# Patient Record
Sex: MALE | Race: WHITE | NOT HISPANIC OR LATINO | Employment: STUDENT | URBAN - METROPOLITAN AREA
[De-identification: names, ages, dates, MRNs, and addresses within clinical notes are randomized per-mention and may not be internally consistent; named-entity substitution may affect disease eponyms.]

---

## 2017-03-02 DIAGNOSIS — E25.0 CONGENITAL ADRENAL HYPERPLASIA DUE TO 21-HYDROXYLASE DEFICIENCY (21-OH CAH), SIMPLE VIRILIZING (H): ICD-10-CM

## 2017-03-02 RX ORDER — FLUDROCORTISONE ACETATE 0.1 MG/1
0.1 TABLET ORAL DAILY
Qty: 90 TABLET | Refills: 2 | Status: SHIPPED | OUTPATIENT
Start: 2017-03-02 | End: 2017-11-28

## 2017-03-02 NOTE — TELEPHONE ENCOUNTER
fludrocortisone     Last Written Prescription Date:  12/10/15  Last Fill Quantity: 90,   # refills: 3  Last Office Visit : 7/6/16  Future Office visit:  No future appt    Routing refill request to provider for review/approval because:  Drug not on the [facility], [facility] or [facility] refill protocol or controlled substance

## 2017-07-25 DIAGNOSIS — E27.40 ADRENAL INSUFFICIENCY (H): ICD-10-CM

## 2017-07-25 RX ORDER — HYDROCORTISONE 10 MG/1
TABLET ORAL
Qty: 270 TABLET | Refills: 1 | Status: SHIPPED | OUTPATIENT
Start: 2017-07-25 | End: 2018-01-18

## 2017-11-28 DIAGNOSIS — E25.0 CONGENITAL ADRENAL HYPERPLASIA DUE TO 21-HYDROXYLASE DEFICIENCY (21-OH CAH), SIMPLE VIRILIZING (H): ICD-10-CM

## 2017-11-28 NOTE — TELEPHONE ENCOUNTER
florinef    Last Written Prescription Date:  3/2/17  Last Fill Quantity: 90,   # refills: 2  Last Office Visit : 7/6/16  Future Office visit:  12/13/17    Routing refill request to provider for review/approval because:  Drug not on the [facility], [facility] or [facility] refill protocol or controlled substance

## 2017-11-29 RX ORDER — FLUDROCORTISONE ACETATE 0.1 MG/1
0.1 TABLET ORAL DAILY
Qty: 90 TABLET | Refills: 0 | Status: SHIPPED | OUTPATIENT
Start: 2017-11-29 | End: 2018-03-06

## 2017-12-13 ENCOUNTER — OFFICE VISIT (OUTPATIENT)
Dept: ENDOCRINOLOGY | Facility: CLINIC | Age: 27
End: 2017-12-13
Payer: COMMERCIAL

## 2017-12-13 VITALS
HEART RATE: 81 BPM | WEIGHT: 157.3 LBS | DIASTOLIC BLOOD PRESSURE: 73 MMHG | SYSTOLIC BLOOD PRESSURE: 135 MMHG | HEIGHT: 69 IN | BODY MASS INDEX: 23.3 KG/M2

## 2017-12-13 DIAGNOSIS — E25.0 CONGENITAL ADRENAL HYPERPLASIA DUE TO 21-HYDROXYLASE DEFICIENCY (21-OH CAH), SIMPLE VIRILIZING (H): Primary | ICD-10-CM

## 2017-12-13 DIAGNOSIS — E25.0 CONGENITAL ADRENAL HYPERPLASIA DUE TO 21-HYDROXYLASE DEFICIENCY (21-OH CAH), SIMPLE VIRILIZING (H): ICD-10-CM

## 2017-12-13 LAB
GLUCOSE SERPL-MCNC: 73 MG/DL (ref 70–99)
POTASSIUM SERPL-SCNC: 4 MMOL/L (ref 3.4–5.3)
SODIUM SERPL-SCNC: 137 MMOL/L (ref 133–144)

## 2017-12-13 ASSESSMENT — PAIN SCALES - GENERAL: PAINLEVEL: NO PAIN (0)

## 2017-12-13 NOTE — PATIENT INSTRUCTIONS
Dr. Cordova will follow up with results of lab tests.    We will be in touch regarding what provider you should see in a year.  Dr. Cordova can handle things until then.

## 2017-12-13 NOTE — LETTER
Date:December 19, 2017      Patient was self referred, no letter generated. Do not send.        [facility] Health Information

## 2017-12-13 NOTE — MR AVS SNAPSHOT
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 Cordova will follow up with results of lab tests.    We will be in touch regarding what provider you should see in a year.  Dr. Cordova can handle things until then.          Follow-ups after your visit        Follow-up notes from your care team     Return in about 1 year (around 12/13/2018).      Your next 10 appointments already scheduled     Dec 13, 2017 10:00 AM CST   LAB with [facility] ([facility])    [address] [place] [id number] [phone]           Please do not eat 10-12 hours before your appointment if you are coming in fasting for labs on lipids, cholesterol, or glucose (sugar). This does not apply to pregnant women. Water, hot tea and black coffee (with nothing added) are okay. Do not drink other fluids, diet soda or chew gum.              Future tests that were ordered for you today     Open Future Orders        Priority Expected Expires Ordered    Androstenedione Routine  12/13/2018 12/13/2017    Potassium Routine  12/8/2018 12/13/2017    Sodium Routine  12/13/2018 12/13/2017    Testosterone total Routine  12/13/2018 12/13/2017    Glucose Routine  12/13/2018 12/13/2017    17 OH progesterone Routine  12/13/2018 12/13/2017            Who to contact     Please call your clinic at [phone] to:    Ask questions about your health    Make or cancel appointments    Discuss your medicines    Learn about your test results    Speak to your doctor   If you have compliments or concerns about an experience at  "your clinic, or if you wish to file a complaint, please contact [facility] Patient Relations at [phone] or email us at [email or web address]         Additional Information About Your Visit        C[employer] Information     Prob[employer] is an electronic gateway that provides easy, online access to your medical records. With Prob[employer], you can request a clinic appointment, read your test results, renew a prescription or communicate with your care team.     To sign up for [employer] visit the website at [email or web address]   You will be asked to enter the access code listed below, as well as some personal information. Please follow the directions to create your username and password.     Your access code is: [email or web address]  Expires: 2018  6:30 AM     Your access code will  in 90 days. If you need help or a new code, please contact your [facility] or call [phone] for assistance.        Care EveryWhere ID     This is your Care EveryWhere ID. This could be used by other organizations to access your [place] medical records  [id number]        Your Vitals Were     Pulse Height BMI (Body Mass Index)             81 1.753 m (5' 9\") 23.23 kg/m2          Blood Pressure from Last 3 Encounters:   17 135/73   16 125/82   04/22/15 129/73    Weight from Last 3 Encounters:   17 71.4 kg (157 lb 4.8 oz)   16 64.4 kg (142 lb)   04/22/15 61.9 kg (136 lb 6.4 oz)                 Today's Medication Changes          These changes are accurate as of: 17  9:53 AM.  If you have any questions, ask your nurse or doctor.               Stop taking these medicines if you haven't already. Please contact your care team if you have questions.     FLUoxetine HCl (PMDD) 20 MG Caps   Stopped by:  Facundo Cordova MD                    Primary Care Provider    None Specified       No primary provider on file.        Equal Access to Services  "    CASSIDY LA : Hadii aad joanie kate Johnston, waaxda luqadaha, qaybta kaalmada leatha, ana john jocelynebarrie pantoja jessicagabriel bianchi . So [facility] [phone].    ATENCIÓN: Si habla ridge, tiene a peralta disposición servicios gratuitos de asistencia lingüística. Llame al [phone].    We comply with applicable federal civil rights laws and [place] laws. We do not discriminate on the basis of race, color, national origin, age, disability, sex, sexual orientation, or gender identity.            Thank you!     Thank you for choosing [facility]  for your care. Our goal is always to provide you with excellent care. Hearing back from our patients is one way we can continue to improve our services. Please take a few minutes to complete the written survey that you may receive in the mail after your visit with us. Thank you!             Your Updated Medication List - Protect others around you: Learn how to safely use, store and throw away your medicines at www.disposemymeds.org.          This list is accurate as of: 12/13/17  9:53 AM.  Always use your most recent med list.                   Brand Name Dispense Instructions for use Diagnosis    ADVAIR DISKUS 250-50 MCG/DOSE diskus inhaler   Generic drug:  fluticasone-salmeterol      Inhale 1 puff into the lungs every 12 hours.        ALBUTEROL      as needed.        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

## 2017-12-13 NOTE — PROGRESS NOTES
DIAGNOSIS:  A 27-year-old man with congenital adrenal hyperplasia, on hydrocortisone and Florinef therapy.      HISTORY OF PRESENT ILLNESS:  Mr. Dsouza is here for a followup visit.  We typically see him about every 12 months.  It has been about a year and a half since his last visit with us when we saw him in 07/2016.  At that time, he was doing well.  His androstenedione and testosterone levels were in a good range, and we did not make any changes in his regimen of hydrocortisone 20 mg in the morning and  10 in the evening and Florinef 0.1 mg daily.       He returns now to follow up.  He continues on the same regimen of hydrocortisone and Florinef.  He feels well.  He has had no new medical issues or problems since we last saw him.  He has had no surgeries.      Energy level is good.  He has no orthostatic symptoms.  He does tend to like salt.  Appetite is good.  He reports a good libido.  He has had no bone issues and no fractures since we saw him last.  He has had no problems with fluid retention or lower extremity swelling.      MEDICATIONS:  Fludrocortisone and hydrocortisone as above.  He is on a daily medication for asthma and has an albuterol inhaler to use as needed.  He is on venlafaxine and Wellbutrin for some depression.      REVIEW OF SYSTEMS:  As above.  He has no acute cardiac or respiratory complaints.  He has noted no lumps or pain in the testicles.  He reports good libido.      Of note, since we saw him last he did change his work position and now works for the [employer].       PHYSICAL EXAMINATION:   GENERAL:  A very healthy-appearing young man who appears normally virilized.  He has no cushingoid features.    VITAL SIGNS:  Weight is 157 pounds; he has gained about 15 pounds since 07/2016.  Pulse 81.  Blood pressure 135/73.   EYES:  Extraocular movements are intact.   SKIN:  He has no hyperpigmentation, no abnormal striae or bruising.  He has normal hair distribution in a male  pattern.   CHEST:  Clear.   CARDIAC:  Regular rate and rhythm, normal heart sounds.  He has a midline surgical scar from a cardiac surgery he had when he was younger.   ABDOMEN:  Flat.   EXTREMITIES:  He has no edema.   GENITALIA:  Penis is uncircumcised, normally developed.  Testes are 20 mL bilaterally, normal consistency.  I do not palpate any mass in the testes.      LABORATORY TESTS:  He has no laboratory tests in the interim since we saw him in 07/2016.  At that time, he had a normal sodium of 137.  Potassium was borderline low at 3.3.  Glucose was 97.  Testosterone was 348.  Androstenedione was 1.05.       ASSESSMENT:  The patient is a 27-year-old man with a diagnosis of congenital adrenal hyperplasia.  The patient was diagnosed at age 1 month with symptoms of failure to thrive.  He has been on hormone replacement therapy since that time and has had no evidence of complications.      Interestingly, Laci's mother also has a diagnosis of congenital adrenal hyperplasia which on the surface seems a little unusual since it is a recessive disease.  We have documented elevated    17-hydroxyprogesterone levels in Laci in the past back in 2011, so certainly his laboratory tests would be consistent with the diagnosis.       Clinically he has had no evidence of adrenal insufficiency.  On testicular exam, there has been no evidence of any adrenal rest tumors.       PLAN:   1.  Continue current dose of hydrocortisone and Florinef for now.   2.  We will check routine monitoring laboratory tests including androstenedione and testosterone.  We will follow up with him on the results and make any adjustments in his therapy if necessary.  Otherwise, we continue his current regimen.      As I am retiring from clinical practice, we will plan to have him return in a year to see one of my colleagues.  I can be available to manage things in the interim should the need arise.

## 2017-12-13 NOTE — LETTER
12/13/2017       RE: Laci Dsouza  [address] [facility] [address]     Dear Colleague,    Thank you for referring your patient, Laci Dsouza, to the [facility] at [facility] Please see a copy of my visit note below.    DIAGNOSIS:  A 27-year-old man with congenital adrenal hyperplasia, on hydrocortisone and Florinef therapy.      HISTORY OF PRESENT ILLNESS:  Mr. Dsouza is here for a followup visit.  We typically see him about every 12 months.  It has been about a year and a half since his last visit with us when we saw him in 07/2016.  At that time, he was doing well.  His androstenedione and testosterone levels were in a good range, and we did not make any changes in his regimen of hydrocortisone 20 mg in the morning and  10 in the evening and Florinef 0.1 mg daily.       He returns now to follow up.  He continues on the same regimen of hydrocortisone and Florinef.  He feels well.  He has had no new medical issues or problems since we last saw him.  He has had no surgeries.      Energy level is good.  He has no orthostatic symptoms.  He does tend to like salt.  Appetite is good.  He reports a good libido.  He has had no bone issues and no fractures since we saw him last.  He has had no problems with fluid retention or lower extremity swelling.      MEDICATIONS:  Fludrocortisone and hydrocortisone as above.  He is on a daily medication for asthma and has an albuterol inhaler to use as needed.  He is on venlafaxine and Wellbutrin for some depression.      REVIEW OF SYSTEMS:  As above.  He has no acute cardiac or respiratory complaints.  He has noted no lumps or pain in the testicles.  He reports good libido.      Of note, since we saw him last he did change his work position and now works for the [employer].       PHYSICAL EXAMINATION:   GENERAL:  A very healthy-appearing young man who appears normally virilized.  He has no cushingoid  features.    VITAL SIGNS:  Weight is 157 pounds; he has gained about 15 pounds since 07/2016.  Pulse 81.  Blood pressure 135/73.   EYES:  Extraocular movements are intact.   SKIN:  He has no hyperpigmentation, no abnormal striae or bruising.  He has normal hair distribution in a male pattern.   CHEST:  Clear.   CARDIAC:  Regular rate and rhythm, normal heart sounds.  He has a midline surgical scar from a cardiac surgery he had when he was younger.   ABDOMEN:  Flat.   EXTREMITIES:  He has no edema.   GENITALIA:  Penis is uncircumcised, normally developed.  Testes are 20 mL bilaterally, normal consistency.  I do not palpate any mass in the testes.      LABORATORY TESTS:  He has no laboratory tests in the interim since we saw him in 07/2016.  At that time, he had a normal sodium of 137.  Potassium was borderline low at 3.3.  Glucose was 97.  Testosterone was 348.  Androstenedione was 1.05.       ASSESSMENT:  The patient is a 27-year-old man with a diagnosis of congenital adrenal hyperplasia.  The patient was diagnosed at age 1 month with symptoms of failure to thrive.  He has been on hormone replacement therapy since that time and has had no evidence of complications.      Interestingly, Laci's mother also has a diagnosis of congenital adrenal hyperplasia which on the surface seems a little unusual since it is a recessive disease.  We have documented elevated    17-hydroxyprogesterone levels in Laci in the past back in 2011, so certainly his laboratory tests would be consistent with the diagnosis.       Clinically he has had no evidence of adrenal insufficiency.  On testicular exam, there has been no evidence of any adrenal rest tumors.       PLAN:   1.  Continue current dose of hydrocortisone and Florinef for now.   2.  We will check routine monitoring laboratory tests including androstenedione and testosterone.  We will follow up with him on the results and make any adjustments in his therapy if necessary.  Otherwise,  we continue his current regimen.      As I am retiring from clinical practice, we will plan to have him return in a year to see one of my colleagues.  I can be available to manage things in the interim should the need arise.           Again, thank you for allowing me to participate in the care of your patient.      Sincerely,    Facundo Cordova MD

## 2017-12-13 NOTE — LETTER
12/13/2017      RE: Laci Dsouza  [address] [facility] [address]       DIAGNOSIS:  A 27-year-old man with congenital adrenal hyperplasia, on hydrocortisone and Florinef therapy.      HISTORY OF PRESENT ILLNESS:  Mr. Dsouza is here for a followup visit.  We typically see him about every 12 months.  It has been about a year and a half since his last visit with us when we saw him in 07/2016.  At that time, he was doing well.  His androstenedione and testosterone levels were in a good range, and we did not make any changes in his regimen of hydrocortisone 20 mg in the morning and  10 in the evening and Florinef 0.1 mg daily.       He returns now to follow up.  He continues on the same regimen of hydrocortisone and Florinef.  He feels well.  He has had no new medical issues or problems since we last saw him.  He has had no surgeries.      Energy level is good.  He has no orthostatic symptoms.  He does tend to like salt.  Appetite is good.  He reports a good libido.  He has had no bone issues and no fractures since we saw him last.  He has had no problems with fluid retention or lower extremity swelling.      MEDICATIONS:  Fludrocortisone and hydrocortisone as above.  He is on a daily medication for asthma and has an albuterol inhaler to use as needed.  He is on venlafaxine and Wellbutrin for some depression.      REVIEW OF SYSTEMS:  As above.  He has no acute cardiac or respiratory complaints.  He has noted no lumps or pain in the testicles.  He reports good libido.      Of note, since we saw him last he did change his work position and now works for the [employer].       PHYSICAL EXAMINATION:   GENERAL:  A very healthy-appearing young man who appears normally virilized.  He has no cushingoid features.    VITAL SIGNS:  Weight is 157 pounds; he has gained about 15 pounds since 07/2016.  Pulse 81.  Blood pressure 135/73.   EYES:  Extraocular movements are intact.   SKIN:  He has no  hyperpigmentation, no abnormal striae or bruising.  He has normal hair distribution in a male pattern.   CHEST:  Clear.   CARDIAC:  Regular rate and rhythm, normal heart sounds.  He has a midline surgical scar from a cardiac surgery he had when he was younger.   ABDOMEN:  Flat.   EXTREMITIES:  He has no edema.   GENITALIA:  Penis is uncircumcised, normally developed.  Testes are 20 mL bilaterally, normal consistency.  I do not palpate any mass in the testes.      LABORATORY TESTS:  He has no laboratory tests in the interim since we saw him in 07/2016.  At that time, he had a normal sodium of 137.  Potassium was borderline low at 3.3.  Glucose was 97.  Testosterone was 348.  Androstenedione was 1.05.       ASSESSMENT:  The patient is a 27-year-old man with a diagnosis of congenital adrenal hyperplasia.  The patient was diagnosed at age 1 month with symptoms of failure to thrive.  He has been on hormone replacement therapy since that time and has had no evidence of complications.      Interestingly, Laci's mother also has a diagnosis of congenital adrenal hyperplasia which on the surface seems a little unusual since it is a recessive disease.  We have documented elevated    17-hydroxyprogesterone levels in Laci in the past back in 2011, so certainly his laboratory tests would be consistent with the diagnosis.       Clinically he has had no evidence of adrenal insufficiency.  On testicular exam, there has been no evidence of any adrenal rest tumors.       PLAN:   1.  Continue current dose of hydrocortisone and Florinef for now.   2.  We will check routine monitoring laboratory tests including androstenedione and testosterone.  We will follow up with him on the results and make any adjustments in his therapy if necessary.  Otherwise, we continue his current regimen.      As I am retiring from clinical practice, we will plan to have him return in a year to see one of my colleagues.  I can be available to manage things in  the interim should the need arise.           Facundo Cordova MD

## 2017-12-13 NOTE — NURSING NOTE
Chief Complaint   Patient presents with     RECHECK     FOLLOW UP CONGENITAL ADRENAL      Ping Barnes CMA

## 2017-12-15 LAB — TESTOST SERPL-MCNC: 153 NG/DL (ref 240–950)

## 2017-12-17 LAB — ANDROST SERPL-MCNC: 1.89 NG/ML (ref 0.33–1.34)

## 2017-12-27 LAB — 17OHP SERPL-MCNC: 2800 NG/DL

## 2018-01-03 DIAGNOSIS — E25.0 CONGENITAL ADRENAL HYPERPLASIA DUE TO 21-HYDROXYLASE DEFICIENCY (21-OH CAH), SIMPLE VIRILIZING (H): Primary | ICD-10-CM

## 2018-01-04 ENCOUNTER — TELEPHONE (OUTPATIENT)
Dept: ENDOCRINOLOGY | Facility: CLINIC | Age: 28
End: 2018-01-04

## 2018-01-04 NOTE — TELEPHONE ENCOUNTER
Left msg on vm informing him that Dr Cordova has sent a hard copy of his Letter of 01/03/2018 answering his question.

## 2018-01-04 NOTE — TELEPHONE ENCOUNTER
----- Message from Facundo Cordova MD sent at 1/4/2018  9:08 AM CST -----  Sent him letter explaining labs and why wanted to repeat (he is not on My Chart), but he prob has not gotten letter yet.  Can you make sure letter has gone out.  Can tell him to call me after gets letter if still has questions.  Thanks BR        ----- Message -----     From: Velia Johnson RN     Sent: 1/4/2018   7:26 AM       To: Facundo Cordova MD    Pt confirms he understands your recommendation but is also requesting why you are requesting more labs at this time.   ----- Message -----     From: Facundo Cordova MD     Sent: 1/3/2018   4:15 PM       To: [facility]      Would like pt to come to lab to recheck some lab tests.  Orders put into epic.  We are checking testosterone level so he should have the lab done in the morning by 10 am or earlier.  Tell him to make sure he takes his hydrocortisone the day before and the morning he gets the labs done. Thanks BR

## 2018-01-09 DIAGNOSIS — E25.0 CONGENITAL ADRENAL HYPERPLASIA DUE TO 21-HYDROXYLASE DEFICIENCY (21-OH CAH), SIMPLE VIRILIZING (H): ICD-10-CM

## 2018-01-09 LAB — LH SERPL-ACNC: 1.8 IU/L (ref 1.5–9.3)

## 2018-01-10 LAB — TESTOST SERPL-MCNC: 383 NG/DL (ref 240–950)

## 2018-01-12 LAB — ANDROST SERPL-MCNC: 2.32 NG/ML (ref 0.33–1.34)

## 2018-01-18 DIAGNOSIS — E27.40 ADRENAL INSUFFICIENCY (H): ICD-10-CM

## 2018-01-18 RX ORDER — HYDROCORTISONE 10 MG/1
TABLET ORAL
Qty: 270 TABLET | Refills: 1 | Status: SHIPPED | OUTPATIENT
Start: 2018-01-18 | End: 2018-07-09

## 2018-01-18 RX ORDER — HYDROCORTISONE 10 MG/1
TABLET ORAL
Qty: 270 TABLET | Refills: 1 | Status: CANCELLED | OUTPATIENT
Start: 2018-01-18

## 2018-01-18 NOTE — TELEPHONE ENCOUNTER
cortef  Last Written Prescription Date:  7/25/17  Last Fill Quantity: 270,   # refills: 1  Last Office Visit :12/13/17  Future Office visit:  no    Routing refill request to provider for review/approval because:  Former pt of Dr. Cordova

## 2018-02-12 ENCOUNTER — TELEPHONE (OUTPATIENT)
Dept: ENDOCRINOLOGY | Facility: CLINIC | Age: 28
End: 2018-02-12

## 2018-02-12 NOTE — TELEPHONE ENCOUNTER
Pt is former Dr. Cordova pt, having first dental check up has had in years. D/t previous surgery he had, must take prophylactic antibiotics. Dr. Cordova was closest he had to PCP. Requesting be Rx'd. Please advise at [phone]

## 2018-02-14 NOTE — TELEPHONE ENCOUNTER
Dr Cordova is now retired from clinic . Endocrine is not PC care . Laci needs to  establish care with a PCP.

## 2018-03-06 DIAGNOSIS — E25.0 CONGENITAL ADRENAL HYPERPLASIA DUE TO 21-HYDROXYLASE DEFICIENCY (21-OH CAH), SIMPLE VIRILIZING (H): ICD-10-CM

## 2018-03-06 RX ORDER — FLUDROCORTISONE ACETATE 0.1 MG/1
0.1 TABLET ORAL DAILY
Qty: 90 TABLET | Refills: 3 | Status: SHIPPED | OUTPATIENT
Start: 2018-03-06 | End: 2019-03-21

## 2018-07-08 DIAGNOSIS — E27.40 ADRENAL INSUFFICIENCY (H): ICD-10-CM

## 2018-07-08 RX ORDER — HYDROCORTISONE 10 MG/1
TABLET ORAL
Qty: 270 TABLET | Refills: 1 | Status: CANCELLED | OUTPATIENT
Start: 2018-07-08

## 2018-07-09 DIAGNOSIS — E27.40 ADRENAL INSUFFICIENCY (H): ICD-10-CM

## 2018-07-09 RX ORDER — HYDROCORTISONE 10 MG/1
TABLET ORAL
Qty: 270 TABLET | Refills: 1 | Status: SHIPPED | OUTPATIENT
Start: 2018-07-09 | End: 2019-01-10

## 2018-07-09 NOTE — TELEPHONE ENCOUNTER
cortef  Last Written Prescription Date:  1/18/18  Last Fill Quantity: 270,   # refills: 1  Last Office Visit : 12/13/17  Future Office visit:  12/3/18    Routing refill request to provider for review/approval because:  Former pt of Dr. Cordova

## 2018-12-03 ENCOUNTER — OFFICE VISIT (OUTPATIENT)
Dept: ENDOCRINOLOGY | Facility: CLINIC | Age: 28
End: 2018-12-03
Payer: COMMERCIAL

## 2018-12-03 VITALS
DIASTOLIC BLOOD PRESSURE: 79 MMHG | WEIGHT: 152 LBS | HEART RATE: 65 BPM | HEIGHT: 69 IN | BODY MASS INDEX: 22.51 KG/M2 | SYSTOLIC BLOOD PRESSURE: 131 MMHG

## 2018-12-03 DIAGNOSIS — E25.0 CONGENITAL ADRENAL HYPERPLASIA (H): Primary | ICD-10-CM

## 2018-12-03 DIAGNOSIS — E25.0 CONGENITAL ADRENAL HYPERPLASIA (H): ICD-10-CM

## 2018-12-03 DIAGNOSIS — Z00.00 HEALTH CARE MAINTENANCE: ICD-10-CM

## 2018-12-03 LAB
ALBUMIN SERPL-MCNC: 4.2 G/DL (ref 3.4–5)
ANION GAP SERPL CALCULATED.3IONS-SCNC: 8 MMOL/L (ref 3–14)
BUN SERPL-MCNC: 11 MG/DL (ref 7–30)
CALCIUM SERPL-MCNC: 9 MG/DL (ref 8.5–10.1)
CHLORIDE SERPL-SCNC: 104 MMOL/L (ref 94–109)
CO2 SERPL-SCNC: 26 MMOL/L (ref 20–32)
CREAT SERPL-MCNC: 0.62 MG/DL (ref 0.66–1.25)
DHEA-S SERPL-MCNC: 89 UG/DL (ref 80–560)
GFR SERPL CREATININE-BSD FRML MDRD: >90 ML/MIN/1.7M2
GLUCOSE SERPL-MCNC: 60 MG/DL (ref 70–99)
LH SERPL-ACNC: 2.4 IU/L (ref 1.5–9.3)
POTASSIUM SERPL-SCNC: 3.6 MMOL/L (ref 3.4–5.3)
SODIUM SERPL-SCNC: 139 MMOL/L (ref 133–144)

## 2018-12-03 RX ORDER — BUDESONIDE AND FORMOTEROL FUMARATE DIHYDRATE 160; 4.5 UG/1; UG/1
2 AEROSOL RESPIRATORY (INHALATION) 2 TIMES DAILY
Refills: 11 | COMMUNITY
Start: 2018-10-27 | End: 2019-02-28

## 2018-12-03 RX ORDER — BUPROPION HYDROCHLORIDE 100 MG/1
TABLET, EXTENDED RELEASE ORAL
COMMUNITY
Start: 2017-05-08 | End: 2019-03-04

## 2018-12-03 NOTE — PROGRESS NOTES
Laci Dsouza is a 28 year old male with congenital adrenal hyperplasia, on hydrocortisone and Florinef therapy.  He was previously seen by Dr. Cordova.   The patient was diagnosed at 1 month of age and has been on lifelong hormone replacement.  Current dose of hydrocortisone is 20 mg in the morning, around 6 AM, when he wakes up, and 10 mg around 4-5 PM, when he comes back from work.  With the exception of some fatigue noticed when he comes back from work, he denies any other signs or symptoms.  He takes the Florinef dose in the morning, 0.1 mg daily.     Past Medical History   Past Medical History:   Diagnosis Date     Anxiety      Aortic atresia or stenosis, congenital 12/2001    Repair 2001 after had MI;     Congenital adrenal hyperplasia (H) 1989    Dx at 1 mo of age; mother with CAH also (?)   Depression   No fractures  Asthma    Past Surgical History   No past surgical history on file.    Current Medications  Prescription Medications as of 12/3/2018             albuterol (PROAIR HFA, PROVENTIL HFA, VENTOLIN HFA) 108 (90 BASE) MCG/ACT inhaler Inhale 2 puffs into the lungs every 6 hours as needed for shortness of breath / dyspnea or wheezing    buPROPion (WELLBUTRIN SR) 100 MG 12 hr tablet TAKE 1 TABLET BY MOUTH ONCE A DAY    fludrocortisone (FLORINEF) 0.1 MG tablet Take 1 tablet (0.1 mg) by mouth daily    hydrocortisone (CORTEF) 10 MG tablet 2 tabs in am and 1 tab in afternoon/evening    SYMBICORT 160-4.5 MCG/ACT Inhaler Inhale 2 puffs into the lungs 2 times daily    Venlafaxine HCl (EFFEXOR PO) Take by mouth 3 times daily      Takes a MVI for men     Family History   Mother - congenital adrenal hyperplasia. Father - CVA in his 60s.     Social History  Single. No children. He might be interested in adopting, in the future. He recently quit smoking. Smoker, 1/2 PPD for 10 years. Drinks 1-4 alcoholic drinks a week.  Denies using illicit drugs. 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  "    Review of Systems   Systemic:               Eye:                      No eye symptoms   Lizeth-Laryngeal:     No lizeht-laryngeal symptoms, no dysphagia, no hoarseness, no cough     Breast:                  No breast symptoms  Cardiovascular:    No cardiovascular symptoms, no CP or palpitations   Pulmonary:           No pulmonary symptoms, no SOB or cough    Gastrointestinal:   No gastrointestinal symptoms, no diarrhea or constipation   Genitourinary:       increased urination - for many years; urinates ~1-2 times a night; drinks 8 glasses of 8 oz water daily, by habit; some weak stream    Endocrine:            No endocrine symptoms, no cold or heat intolerance   Neurological:        No neurological symptoms, no headaches, no tremor, no numbness or tingling sensation, no dizziness   Musculoskeletal:  Lower back joint pain   Skin:                     No skin symptoms, no dry skin, no hair falling out; no changes of the facial hair   Psychological:      Depression - longstanding and controlled                 Vital Signs     Previous Weights:    Wt Readings from Last 10 Encounters:   12/03/18 68.9 kg (152 lb)   12/13/17 71.4 kg (157 lb 4.8 oz)   07/06/16 64.4 kg (142 lb)   04/22/15 61.9 kg (136 lb 6.4 oz)   11/08/13 61 kg (134 lb 8 oz)   12/12/12 60.8 kg (134 lb)        /79  Pulse 65  Ht 1.753 m (5' 9\")  Wt 68.9 kg (152 lb)  BMI 22.45 kg/m2    Physical Exam  General Appearance: he is well developed, well nourished and in no distress     Eyes:  conjutivae and extra-ocular motions are normal.                                    pupils round and reactive to light, no lid lag, no stare    HEENT:   oropharynx clear and moist, no JVD, no bruits      no thyromegaly, no palpable nodules   Cardiovascular:  regular rhythm, no murmurs, distal pulse palpable, no edema  Respiratory:        chest clear, no rales, no rhonchi   Gastrointestinal:  abdomen soft, non-tender, non-distended, normal bowel sounds,    no " organomegaly  Musculoskeletal:  normal tone and strength  Psychological:          affect and judgment normal  Skin:  normal male hair pattern; no gynecomastia; ? Extra nipple R and and lower chest   Neurological:  reflexes normal and symmetric, no resting tremor   Genital exam:            Normal testicular size, no palpable masses     Lab Results  I reviewed prior lab results documented in Epic.  TSH   Date Value Ref Range Status   08/07/2008 0.34 mcU/mL Final     Assessment     1. Congenital adrenal hyperplasia, diagnosed shortly after birth.  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  It is interesting that the patient has a family history of congenital adrenal hyperplasia in his mother.  At this point in time, he is not interested in conceiving.  Clinically, he is a symptomatic with the exception of mild fatigue, noticed when he comes back from work, in the afternoon.    Recommendations:  Administer the first dose of hydrocortisone and the Florinef right when he wakes up  Try to administer the second dose of 10 mg hydrocortisone around 2 PM   Schedule an ultrasound of the testes, to evaluate for adrenal rest tumors  Check lab work: Free and total testosterone, LH, DHEAS, androstenedione, 17 hydroxyprogesterone    2.  Health maintenance  The patient reports taking a daily multivitamin but he is not sure of the vitamin D dose it contains.  In view of the long-term treatment with hydrocortisone, and the high risk of osteoporosis, I recommended to have a vitamin D level checked today.  We are going to decide on the vitamin D dose depending on lab results.     Orders Placed This Encounter   Procedures     US Testicular and Scrotum     Testosterone Free and Total     Lutropin     DHEA sulfate     Androstenedione     Basic metabolic panel     25 Hydroxyvitamin D2 and D3     Albumin level     17 OH progesterone

## 2018-12-03 NOTE — NURSING NOTE
Chief Complaint   Patient presents with     RECHECK     Congenital Adrenal Hyperplasia     Yumiko White, CMA     Wife

## 2018-12-03 NOTE — MR AVS SNAPSHOT
After Visit Summary   12/3/2018    Laci Dsouza    MRN: [id number]           Patient Information     Date Of Birth          1990        Visit Information        Provider Department      12/3/2018 7:30 AM Deja Warner MD [facility]        Today's Diagnoses     Congenital adrenal hyperplasia (H)    -  1      Care Instructions    To schedule with our Imaging Department, please call: [phone]            Follow-ups after your visit        Follow-up notes from your care team     Return in about 1 year (around 12/3/2019) for labs today, ultrasound to be scheduled.      Your next 10 appointments already scheduled     Dec 03, 2018  8:15 AM CST   LAB with [facility] ([facility])    [address] [place] [id number] [phone]           Please do not eat 10-12 hours before your appointment if you are coming in fasting for labs on lipids, cholesterol, or glucose (sugar). This does not apply to pregnant women. Water, hot tea and black coffee (with nothing added) are okay. Do not drink other fluids, diet soda or chew gum.              Future tests that were ordered for you today     Open Future Orders        Priority Expected Expires Ordered    17 OH progesterone Routine 12/3/2018 12/3/2019 12/3/2018    US Testicular and Scrotum Routine  12/3/2019 12/3/2018    Testosterone Free and Total Routine 12/3/2018 12/3/2019 12/3/2018    Lutropin Routine 12/3/2018 12/3/2019 12/3/2018    DHEA sulfate Routine  12/3/2019 12/3/2018    Androstenedione Routine  12/3/2019 12/3/2018    Basic metabolic panel Routine 12/3/2018 12/3/2019 12/3/2018    25 Hydroxyvitamin D2 and D3 Routine 12/3/2018 12/3/2019 12/3/2018    Albumin level Routine 12/3/2018 12/3/2019 12/3/2018            Who to contact     Please call your clinic at [phone] to:    Ask questions about your health    Make or cancel appointments    Discuss your  "medicines    Learn about your test results    Speak to your doctor            Additional Information About Your Visit        MyChart Information     [employer] is an electronic gateway that provides easy, online access to your medical records. With [employer], you can request a clinic appointment, read your test results, renew a prescription or communicate with your care team.     To sign up for [employer] visit the website at [email or web address]   You will be asked to enter the access code listed below, as well as some personal information. Please follow the directions to create your username and password.     Your access code is: [email or web address]  Expires: 2019  6:30 AM     Your access code will  in 90 days. If you need help or a new code, please contact your [facility] or call [phone] for assistance.        Care EveryWhere ID     This is your Care EveryWhere ID. This could be used by other organizations to access your [facility] medical records  [id number]        Your Vitals Were     Pulse Height BMI (Body Mass Index)             65 1.753 m (5' 9\") 22.45 kg/m2          Blood Pressure from Last 3 Encounters:   18 131/79   17 135/73   16 125/82    Weight from Last 3 Encounters:   18 68.9 kg (152 lb)   17 71.4 kg (157 lb 4.8 oz)   16 64.4 kg (142 lb)               Primary Care Provider    None Specified       No primary provider on file.        Equal Access to Services     CASSIDY LA : Hadii mendez matao Sojavier, waaxda luqadaha, qaybta kaalmada ana zhang . So [facility] [phone].    ATENCIÓN: Si habla español, tiene a peralta disposición servicios gratuitos de asistencia lingüística. Llame al [phone].    We comply with applicable federal civil rights laws and [place] laws. We do not discriminate on the basis of race, color, national origin, age, disability, sex, sexual orientation, or gender " identity.            Thank you!     Thank you for choosing [facility]  for your care. Our goal is always to provide you with excellent care. Hearing back from our patients is one way we can continue to improve our services. Please take a few minutes to complete the written survey that you may receive in the mail after your visit with us. Thank you!             Your Updated Medication List - Protect others around you: Learn how to safely use, store and throw away your medicines at www.disposemymeds.org.          This list is accurate as of 12/3/18  8:04 AM.  Always use your most recent med list.                   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

## 2018-12-03 NOTE — LETTER
12/3/2018       RE: Laci Dsouza  [address] [facility] [address]     Dear Colleague,    Thank you for referring your patient, Laci Dsouza, to the [facility] ENDOCR[facility] at [facility]. Please see a copy of my visit note below.      Laci Dsouza is a 28 year old male with congenital adrenal hyperplasia, on hydrocortisone and Florinef therapy.  He was previously seen by Dr. Cordova.   The patient was diagnosed at 1 month of age and has been on lifelong hormone replacement.  Current dose of hydrocortisone is 20 mg in the morning, around 6 AM, when he wakes up, and 10 mg around 4-5 PM, when he comes back from work.  With the exception of some fatigue noticed when he comes back from work, he denies any other signs or symptoms.  He takes the Florinef dose in the morning, 0.1 mg daily.     Past Medical History   Past Medical History:   Diagnosis Date     Anxiety      Aortic atresia or stenosis, congenital 12/2001    Repair 2001 after had MI;     Congenital adrenal hyperplasia (H) 1989    Dx at 1 mo of age; mother with CAH also (?)   Depression   No fractures  Asthma    Past Surgical History   No past surgical history on file.    Current Medications  Prescription Medications as of 12/3/2018             albuterol (PROAIR HFA, PROVENTIL HFA, VENTOLIN HFA) 108 (90 BASE) MCG/ACT inhaler Inhale 2 puffs into the lungs every 6 hours as needed for shortness of breath / dyspnea or wheezing    buPROPion (WELLBUTRIN SR) 100 MG 12 hr tablet TAKE 1 TABLET BY MOUTH ONCE A DAY    fludrocortisone (FLORINEF) 0.1 MG tablet Take 1 tablet (0.1 mg) by mouth daily    hydrocortisone (CORTEF) 10 MG tablet 2 tabs in am and 1 tab in afternoon/evening    SYMBICORT 160-4.5 MCG/ACT Inhaler Inhale 2 puffs into the lungs 2 times daily    Venlafaxine HCl (EFFEXOR PO) Take by mouth 3 times daily      Takes a MVI for men     Family History   Mother - congenital adrenal hyperplasia. Father - CVA in his  "60s.     Social History  Single. No children. He might be interested in adopting, in the future. He recently quit smoking. Smoker, 1/2 PPD for 10 years. Drinks 1-4 alcoholic drinks a week.  Denies using illicit drugs. 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     Review of Systems   Systemic:               Eye:                      No eye symptoms   Lizeth-Laryngeal:     No lizeth-laryngeal symptoms, no dysphagia, no hoarseness, no cough     Breast:                  No breast symptoms  Cardiovascular:    No cardiovascular symptoms, no CP or palpitations   Pulmonary:           No pulmonary symptoms, no SOB or cough    Gastrointestinal:   No gastrointestinal symptoms, no diarrhea or constipation   Genitourinary:       increased urination - for many years; urinates ~1-2 times a night; drinks 8 glasses of 8 oz water daily, by habit; some weak stream    Endocrine:            No endocrine symptoms, no cold or heat intolerance   Neurological:        No neurological symptoms, no headaches, no tremor, no numbness or tingling sensation, no dizziness   Musculoskeletal:  Lower back joint pain   Skin:                     No skin symptoms, no dry skin, no hair falling out; no changes of the facial hair   Psychological:      Depression - longstanding and controlled                 Vital Signs     Previous Weights:    Wt Readings from Last 10 Encounters:   12/03/18 68.9 kg (152 lb)   12/13/17 71.4 kg (157 lb 4.8 oz)   07/06/16 64.4 kg (142 lb)   04/22/15 61.9 kg (136 lb 6.4 oz)   11/08/13 61 kg (134 lb 8 oz)   12/12/12 60.8 kg (134 lb)        /79  Pulse 65  Ht 1.753 m (5' 9\")  Wt 68.9 kg (152 lb)  BMI 22.45 kg/m2    Physical Exam  General Appearance: he is well developed, well nourished and in no distress     Eyes:  conjutivae and extra-ocular motions are normal.                                    pupils round and reactive to light, no lid lag, no stare    HEENT:   oropharynx clear and moist, no JVD, no bruits "      no thyromegaly, no palpable nodules   Cardiovascular:  regular rhythm, no murmurs, distal pulse palpable, no edema  Respiratory:        chest clear, no rales, no rhonchi   Gastrointestinal:  abdomen soft, non-tender, non-distended, normal bowel sounds,    no organomegaly  Musculoskeletal:  normal tone and strength  Psychological:          affect and judgment normal  Skin:  normal male hair pattern; no gynecomastia; ? Extra nipple R and and lower chest   Neurological:  reflexes normal and symmetric, no resting tremor   Genital exam:            Normal testicular size, no palpable masses     Lab Results  I reviewed prior lab results documented in Epic.  TSH   Date Value Ref Range Status   08/07/2008 0.34 mcU/mL Final     Assessment     1. Congenital adrenal hyperplasia, diagnosed shortly after birth.  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  It is interesting that the patient has a family history of congenital adrenal hyperplasia in his mother.  At this point in time, he is not interested in conceiving.  Clinically, he is a symptomatic with the exception of mild fatigue, noticed when he comes back from work, in the afternoon.    Recommendations:  Administer the first dose of hydrocortisone and the Florinef right when he wakes up  Try to administer the second dose of 10 mg hydrocortisone around 2 PM   Schedule an ultrasound of the testes, to evaluate for adrenal rest tumors  Check lab work: Free and total testosterone, LH, DHEAS, androstenedione, 17 hydroxyprogesterone    2.  Health maintenance  The patient reports taking a daily multivitamin but he is not sure of the vitamin D dose it contains.  In view of the long-term treatment with hydrocortisone, and the high risk of osteoporosis, I recommended to have a vitamin D level checked today.  We are going to decide on the vitamin D dose depending on lab results.     Orders Placed This Encounter   Procedures     US Testicular and Scrotum      Testosterone Free and Total     Lutropin     DHEA sulfate     Androstenedione     Basic metabolic panel     25 Hydroxyvitamin D2 and D3     Albumin level     17 OH progesterone           Deja Warner MD

## 2018-12-04 LAB
SHBG SERPL-SCNC: 42 NMOL/L (ref 11–80)
TESTOST FREE SERPL-MCNC: 3.67 NG/DL (ref 4.7–24.4)
TESTOST SERPL-MCNC: 223 NG/DL (ref 240–950)

## 2018-12-07 LAB
ANDROST SERPL-MCNC: 2.35 NG/ML (ref 0.33–1.34)
DEPRECATED CALCIDIOL+CALCIFEROL SERPL-MC: <54 UG/L (ref 20–75)
VITAMIN D2 SERPL-MCNC: <5 UG/L
VITAMIN D3 SERPL-MCNC: 49 UG/L

## 2018-12-11 LAB — 17OHP SERPL-MCNC: 2366 NG/DL

## 2018-12-17 ENCOUNTER — ANCILLARY PROCEDURE (OUTPATIENT)
Dept: ULTRASOUND IMAGING | Facility: CLINIC | Age: 28
End: 2018-12-17
Attending: INTERNAL MEDICINE
Payer: COMMERCIAL

## 2018-12-17 DIAGNOSIS — E25.0 CONGENITAL ADRENAL HYPERPLASIA (H): ICD-10-CM

## 2018-12-20 ENCOUNTER — OFFICE VISIT (OUTPATIENT)
Dept: FAMILY MEDICINE | Facility: CLINIC | Age: 28
End: 2018-12-20
Payer: COMMERCIAL

## 2018-12-20 VITALS
DIASTOLIC BLOOD PRESSURE: 74 MMHG | SYSTOLIC BLOOD PRESSURE: 136 MMHG | OXYGEN SATURATION: 98 % | BODY MASS INDEX: 22.86 KG/M2 | HEART RATE: 90 BPM | WEIGHT: 154.8 LBS

## 2018-12-20 DIAGNOSIS — F41.1 GAD (GENERALIZED ANXIETY DISORDER): ICD-10-CM

## 2018-12-20 DIAGNOSIS — J45.40 ASTHMA, MODERATE PERSISTENT, WELL-CONTROLLED: ICD-10-CM

## 2018-12-20 DIAGNOSIS — Q25.29 AORTIC ATRESIA OR STENOSIS, CONGENITAL: Primary | ICD-10-CM

## 2018-12-20 DIAGNOSIS — Q25.1 AORTIC ATRESIA OR STENOSIS, CONGENITAL: Primary | ICD-10-CM

## 2018-12-20 SDOH — HEALTH STABILITY: MENTAL HEALTH: HOW OFTEN DO YOU HAVE A DRINK CONTAINING ALCOHOL?: 2-4 TIMES A MONTH

## 2018-12-20 SDOH — HEALTH STABILITY: MENTAL HEALTH: HOW MANY STANDARD DRINKS CONTAINING ALCOHOL DO YOU HAVE ON A TYPICAL DAY?: 3 OR 4

## 2018-12-20 ASSESSMENT — ANXIETY QUESTIONNAIRES
3. WORRYING TOO MUCH ABOUT DIFFERENT THINGS: SEVERAL DAYS
5. BEING SO RESTLESS THAT IT IS HARD TO SIT STILL: SEVERAL DAYS
7. FEELING AFRAID AS IF SOMETHING AWFUL MIGHT HAPPEN: SEVERAL DAYS
2. NOT BEING ABLE TO STOP OR CONTROL WORRYING: SEVERAL DAYS
IF YOU CHECKED OFF ANY PROBLEMS ON THIS QUESTIONNAIRE, HOW DIFFICULT HAVE THESE PROBLEMS MADE IT FOR YOU TO DO YOUR WORK, TAKE CARE OF THINGS AT HOME, OR GET ALONG WITH OTHER PEOPLE: SOMEWHAT DIFFICULT
1. FEELING NERVOUS, ANXIOUS, OR ON EDGE: SEVERAL DAYS
6. BECOMING EASILY ANNOYED OR IRRITABLE: SEVERAL DAYS
GAD7 TOTAL SCORE: 7

## 2018-12-20 ASSESSMENT — PATIENT HEALTH QUESTIONNAIRE - PHQ9
5. POOR APPETITE OR OVEREATING: SEVERAL DAYS
SUM OF ALL RESPONSES TO PHQ QUESTIONS 1-9: 5

## 2018-12-20 NOTE — PATIENT INSTRUCTIONS

## 2018-12-20 NOTE — PROGRESS NOTES
SUBJECTIVE:   Laci Dsouza is a 28 year old male who presents to clinic today for the following health issues:    New Patient/Transfer of Care    Patient with history of asthma, anxiety, congential adrenal hyperplasia and aortic stenosis.  He is followed by cardiology and endocrinology and psychiatrist.  He was seen seeing a pediatrician and needs to establish a primary care provider.      Asthma Follow-Up    Was ACT completed today?    Yes    ACT Total Scores 12/20/2018   ACT TOTAL SCORE (Goal Greater than or Equal to 20) 25       Recent asthma triggers that patient is dealing with: None  - He states his asthma is doing well.  He uses albuterol before exercise only.  Has not had any recent asthma exacerbations. He takes Symbicort 160/4.5 2 puffs BID - rinses mouth out afterwards.   No current asthma symptoms.     Seen by endocrinology 12/3/2018 - is on hydrocortisone and florinef.  Diagnosed with congenital adrenal hyperplasia at birth. To follow-up in one year.    Seen by cardiology 7/25/2018 -  In the [facility].  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  To follow-up in one year.  EKG at that time.   Declines any concerns at this time.       Problem list and histories reviewed & adjusted, as indicated.  Additional history: as documented    Surgical history -     supravalvar aortic stenosis   s/p surgical repair including cor reconstruction - as a child - 2001.     Sexually active with male partner - long-term - no concerns with STDs today.   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     Anxiety - depression - well-controlled on effexor and wellbutrin - does not need refills today.  Declines side effects.  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), simple virilizing (H)     Congenital adrenal hyperplasia (H)     Anxiety     Aortic atresia or stenosis, congenital     Asthma, moderate persistent, well-controlled     Past Surgical History:   Procedure Laterality Date     aortic stenosis s/p surgical repair including cor reconstruction 2001         Social History     Tobacco Use     Smoking status: Former Smoker     Smokeless tobacco: Never Used   Substance Use Topics     Alcohol use: Yes     Frequency: 2-4 times a month     Drinks per session: 3 or 4     Family History   Adopted: Yes   Problem Relation Age of Onset     Bipolar Disorder Father          Current Outpatient Medications   Medication Sig Dispense Refill     albuterol (PROAIR HFA, PROVENTIL HFA, VENTOLIN HFA) 108 (90 BASE) MCG/ACT inhaler Inhale 2 puffs into the lungs every 6 hours as needed for shortness of breath / dyspnea or wheezing 1 Inhaler 1     buPROPion (WELLBUTRIN SR) 100 MG 12 hr tablet TAKE 1 TABLET BY MOUTH ONCE A DAY       fludrocortisone (FLORINEF) 0.1 MG tablet Take 1 tablet (0.1 mg) by mouth daily 90 tablet 3     hydrocortisone (CORTEF) 10 MG tablet 2 tabs in am and 1 tab in afternoon/evening 270 tablet 1     SYMBICORT 160-4.5 MCG/ACT Inhaler Inhale 2 puffs into the lungs 2 times daily  11     venlafaxine (EFFEXOR XR) 150 MG 24 hr capsule Take 1 capsule (150 mg) by mouth daily 30 capsule 0     venlafaxine (EFFEXOR) 75 MG tablet Take 1 tablet (75 mg) by mouth daily 30 tablet      Allergies   Allergen Reactions     Ceclor [Cefaclor Monohydrate]      Unknown       Sulfa Drugs      Unknown       Recent Labs   Lab Test 12/03/18  0824 12/13/17  1009  11/08/13  1140  03/23/11  0945   A1C  --   --   --   --   --  4.8   CR 0.62*  --   --  0.69   < >  --    GFRESTIMATED >90  --   --  >90   < >  --    GFRESTBLACK >90  --   --  >90   < >  --    POTASSIUM 3.6 4.0   < > 3.6   < > 4.1    < > =  values in this interval not displayed.      BP Readings from Last 3 Encounters:   12/20/18 136/74   12/03/18 131/79   12/13/17 135/73    Wt Readings from Last 3 Encounters:   12/20/18 70.2 kg (154 lb 12.8 oz)   12/03/18 68.9 kg (152 lb)   12/13/17 71.4 kg (157 lb 4.8 oz)                  Labs reviewed in EPIC    Reviewed and updated as needed this visit by clinical staff  Tobacco  Allergies  Meds  Fam Hx  Soc Hx      Reviewed and updated as needed this visit by Provider  Tobacco  Fam Hx  Soc Hx        ROS:  CONSTITUTIONAL: NEGATIVE for fever, chills, change in weight  ENT/MOUTH: NEGATIVE for ear, mouth and throat problems  RESP: NEGATIVE for significant cough or SOB  CV: NEGATIVE for chest pain, palpitations or peripheral edema POSITIVE for aortic stenosis - followed by cardiology  ENDO: positive for congenital adrenal hypoplasia - followed by endocrinology - see HPI.   PSYCHIATRIC: NEGATIVE for changes in mood or affect    OBJECTIVE:     /74   Pulse 90   Wt 70.2 kg (154 lb 12.8 oz)   SpO2 98%   BMI 22.86 kg/m    Body mass index is 22.86 kg/m .  GENERAL: healthy, alert and no distress  HENT: ear canals and TM's normal, nose and mouth without ulcers or lesions  RESP: lungs clear to auscultation - no rales, rhonchi or wheezes  CV: regular rate and rhythm, normal S1 S2, no S3 or S4, no murmur, click or rub, no peripheral edema and peripheral pulses strong  PSYCH: mentation appears normal, affect normal/bright  LYMPH: no cervical, supraclavicular, axillary, or inguinal adenopathy    Diagnostic Test Results:  none     ASSESSMENT/PLAN:   Laci was seen today for establish care and recheck medication.    Diagnoses and all orders for this visit:    Aortic atresia or stenosis, congenital  -     CARDIOLOGY EVAL ADULT REFERRAL    KENNY (generalized anxiety disorder)    Asthma, moderate persistent, well-controlled        See Patient Instructions  No refills needed today.  Will call when needs refills.  Need referral  for cardiology for the next time he sees them in July 2019.   Patient with no questions or concerns today. Follow-up in 6 months, sooner if needed.   Patient verbalized understanding & agreed with plan of care.    Bettie Gonsalez DNP, APRN CNP  [facility]

## 2018-12-20 NOTE — NURSING NOTE
Chief Complaint   Patient presents with     Establish Care     Pt is here to [facility]     Recheck Medication     Pt is here to get prescription for astham medication.      Ping Villar CMA  2:11 PM 12/20/2018

## 2018-12-21 RX ORDER — VENLAFAXINE HYDROCHLORIDE 150 MG/1
150 CAPSULE, EXTENDED RELEASE ORAL DAILY
Qty: 30 CAPSULE | Refills: 0 | COMMUNITY
Start: 2018-12-21 | End: 2019-03-29

## 2018-12-21 RX ORDER — VENLAFAXINE 75 MG/1
75 TABLET ORAL DAILY
Qty: 30 TABLET | COMMUNITY
Start: 2018-12-21 | End: 2019-03-29

## 2018-12-21 SDOH — HEALTH STABILITY: PHYSICAL HEALTH: ON AVERAGE, HOW MANY DAYS PER WEEK DO YOU ENGAGE IN MODERATE TO STRENUOUS EXERCISE (LIKE A BRISK WALK)?: 5 DAYS

## 2018-12-21 ASSESSMENT — ANXIETY QUESTIONNAIRES: GAD7 TOTAL SCORE: 7

## 2019-01-10 DIAGNOSIS — E27.40 ADRENAL INSUFFICIENCY (H): ICD-10-CM

## 2019-01-10 NOTE — TELEPHONE ENCOUNTER
hydrocortisone (CORTEF) 10 MG tablet     Last Written Prescription Date:  7/9/18  Last Fill Quantity: 270,   # refills: 1  Last Office Visit : 12/3/18  Future Office visit:  None    12/3/18  Alana:  Current dose of hydrocortisone is 20 mg in the morning, around 6 AM, when he wakes up, and 10 mg around 4-5 PM,     Routing refill request to provider for review/approval because: verify entry.

## 2019-01-11 RX ORDER — HYDROCORTISONE 10 MG/1
TABLET ORAL
Qty: 270 TABLET | Refills: 3 | Status: SHIPPED | OUTPATIENT
Start: 2019-01-11 | End: 2019-12-24

## 2019-02-28 ENCOUNTER — TELEPHONE (OUTPATIENT)
Dept: FAMILY MEDICINE | Facility: CLINIC | Age: 29
End: 2019-02-28

## 2019-02-28 DIAGNOSIS — J45.40 ASTHMA, MODERATE PERSISTENT, WELL-CONTROLLED: Primary | ICD-10-CM

## 2019-02-28 RX ORDER — BUDESONIDE AND FORMOTEROL FUMARATE DIHYDRATE 160; 4.5 UG/1; UG/1
2 AEROSOL RESPIRATORY (INHALATION) 2 TIMES DAILY
Qty: 3 INHALER | Refills: 1 | Status: SHIPPED | OUTPATIENT
Start: 2019-02-28 | End: 2019-11-07

## 2019-02-28 NOTE — TELEPHONE ENCOUNTER
[facility]    Phone Message    May a detailed message be left on voicemail: yes    Reason for Call: Medication Refill Request    Has the patient contacted the pharmacy for the refill? Yes   Name of medication being requested: SYMBICORT 160-4.5 MCG/ACT Inhaler  Provider who prescribed the medication: Bettie Gonsalez  Pharmacy: [facility] on [address]   Date medication is needed: Pt will be out in a few days         Action Taken: Message routed to:  [facility] ([facility] NP

## 2019-03-04 DIAGNOSIS — F41.9 ANXIETY: Primary | ICD-10-CM

## 2019-03-05 DIAGNOSIS — E25.0 CONGENITAL ADRENAL HYPERPLASIA (H): ICD-10-CM

## 2019-03-05 LAB
CORTIS SERPL-MCNC: 24.8 UG/DL (ref 4–22)
FSH SERPL-ACNC: 3.3 IU/L (ref 0.7–10.8)
LH SERPL-ACNC: 1.4 IU/L (ref 1.5–9.3)

## 2019-03-06 LAB
ACTH PLAS-MCNC: 57 PG/ML
SHBG SERPL-SCNC: 47 NMOL/L (ref 11–80)
TESTOST FREE SERPL-MCNC: 7.57 NG/DL (ref 4.7–24.4)
TESTOST SERPL-MCNC: 460 NG/DL (ref 240–950)

## 2019-03-07 RX ORDER — BUPROPION HYDROCHLORIDE 100 MG/1
TABLET, EXTENDED RELEASE ORAL
Qty: 30 TABLET | Refills: 5 | Status: SHIPPED | OUTPATIENT
Start: 2019-03-07 | End: 2019-07-20

## 2019-03-21 DIAGNOSIS — E25.0 CONGENITAL ADRENAL HYPERPLASIA DUE TO 21-HYDROXYLASE DEFICIENCY (21-OH CAH), SIMPLE VIRILIZING (H): ICD-10-CM

## 2019-03-21 RX ORDER — FLUDROCORTISONE ACETATE 0.1 MG/1
0.1 TABLET ORAL DAILY
Qty: 90 TABLET | Refills: 2 | Status: SHIPPED | OUTPATIENT
Start: 2019-03-21 | End: 2019-11-07

## 2019-03-21 NOTE — TELEPHONE ENCOUNTER
[facility]    Phone Message    May a detailed message be left on voicemail: yes    Reason for Call: Medication Question or concern regarding medication   Prescription Clarification  Name of Medication: fludrocortisone (FLORINEF) 0.1 MG tablet  Prescribing Provider: MANOHAR Hampshire   Pharmacy: [id number] - [place], [place] - [address] AT [facility]   What on the order needs clarification? Pt took his last pill this morning and states that the pharmacy sent over refill request a couple days with no response. Pt needs this medication today if possible.     Action Taken: Message routed to:  [facility] ([facility]): ENDO

## 2019-03-21 NOTE — TELEPHONE ENCOUNTER
fludrocortisone (FLORINEF) 0.1 MG tablet  Last Written Prescription Date:  3/6/18  Last Fill Quantity: 90,   # refills: 3  Last Office Visit : 12/3/18  Future Office visit: none

## 2019-03-29 DIAGNOSIS — F41.1 GAD (GENERALIZED ANXIETY DISORDER): ICD-10-CM

## 2019-03-29 RX ORDER — VENLAFAXINE HYDROCHLORIDE 75 MG/1
75 CAPSULE, EXTENDED RELEASE ORAL DAILY
Qty: 90 CAPSULE | Refills: 1 | Status: SHIPPED | OUTPATIENT
Start: 2019-03-29 | End: 2019-09-13

## 2019-03-29 RX ORDER — VENLAFAXINE HYDROCHLORIDE 150 MG/1
150 CAPSULE, EXTENDED RELEASE ORAL DAILY
Qty: 90 CAPSULE | Refills: 1 | Status: SHIPPED | OUTPATIENT
Start: 2019-03-29 | End: 2019-09-11

## 2019-04-22 DIAGNOSIS — J45.40 ASTHMA, MODERATE PERSISTENT, WELL-CONTROLLED: Primary | ICD-10-CM

## 2019-04-22 NOTE — TELEPHONE ENCOUNTER
PROAIR RESPICLICK POWDER   Last Written Prescription Date:  N/A  Last Fill Quantity: N/A,   # refills: N/A  Last Office Visit : 12/20/18  Future Office visit:  none    Routing refill request to provider Clinic RN for review/approval because:  Drug not active on patient's medication list

## 2019-07-20 DIAGNOSIS — F41.9 ANXIETY: ICD-10-CM

## 2019-07-23 RX ORDER — BUPROPION HYDROCHLORIDE 100 MG/1
TABLET, EXTENDED RELEASE ORAL
Qty: 90 TABLET | Refills: 0 | Status: SHIPPED | OUTPATIENT
Start: 2019-07-23 | End: 2019-10-25

## 2019-09-11 DIAGNOSIS — F41.1 GAD (GENERALIZED ANXIETY DISORDER): ICD-10-CM

## 2019-09-13 DIAGNOSIS — F41.1 GAD (GENERALIZED ANXIETY DISORDER): ICD-10-CM

## 2019-09-13 RX ORDER — VENLAFAXINE HYDROCHLORIDE 75 MG/1
75 CAPSULE, EXTENDED RELEASE ORAL DAILY
Qty: 90 CAPSULE | Refills: 0 | Status: SHIPPED | OUTPATIENT
Start: 2019-09-13 | End: 2019-12-16

## 2019-09-13 RX ORDER — VENLAFAXINE HYDROCHLORIDE 150 MG/1
150 CAPSULE, EXTENDED RELEASE ORAL DAILY
Qty: 90 CAPSULE | Refills: 0 | Status: SHIPPED | OUTPATIENT
Start: 2019-09-13 | End: 2019-12-04

## 2019-09-30 ENCOUNTER — HEALTH MAINTENANCE LETTER (OUTPATIENT)
Age: 29
End: 2019-09-30

## 2019-10-07 ENCOUNTER — DOCUMENTATION ONLY (OUTPATIENT)
Dept: CARE COORDINATION | Facility: CLINIC | Age: 29
End: 2019-10-07

## 2019-10-25 DIAGNOSIS — F41.9 ANXIETY: ICD-10-CM

## 2019-10-25 RX ORDER — BUPROPION HYDROCHLORIDE 100 MG/1
100 TABLET, EXTENDED RELEASE ORAL DAILY
Qty: 90 TABLET | Refills: 0 | Status: SHIPPED | OUTPATIENT
Start: 2019-10-25 | End: 2019-12-24

## 2019-10-25 NOTE — TELEPHONE ENCOUNTER
BUPROPION HCL  MG TABLET   Last Written Prescription Date:  7/23/2019  Last Fill Quantity: 90,   # refills: 0  Last Office Visit : 12/20/2018  Future Office visit:  None  Associated Diagnoses   Anxiety [F41.9]   #90 Tabs, 0 Refills sent to pharmacy  10/25/2019.        Deaj Gomez RN  Central Triage Red Flags/Med Refills

## 2019-11-07 ENCOUNTER — MYC REFILL (OUTPATIENT)
Dept: ENDOCRINOLOGY | Facility: CLINIC | Age: 29
End: 2019-11-07

## 2019-11-07 ENCOUNTER — MYC REFILL (OUTPATIENT)
Dept: FAMILY MEDICINE | Facility: CLINIC | Age: 29
End: 2019-11-07

## 2019-11-07 DIAGNOSIS — J45.40 ASTHMA, MODERATE PERSISTENT, WELL-CONTROLLED: ICD-10-CM

## 2019-11-07 DIAGNOSIS — E25.0 CONGENITAL ADRENAL HYPERPLASIA DUE TO 21-HYDROXYLASE DEFICIENCY (21-OH CAH), SIMPLE VIRILIZING (H): ICD-10-CM

## 2019-11-07 RX ORDER — BUDESONIDE AND FORMOTEROL FUMARATE DIHYDRATE 160; 4.5 UG/1; UG/1
2 AEROSOL RESPIRATORY (INHALATION) 2 TIMES DAILY
Qty: 3 INHALER | Refills: 1 | Status: SHIPPED | OUTPATIENT
Start: 2019-11-07 | End: 2019-12-24

## 2019-11-07 RX ORDER — FLUDROCORTISONE ACETATE 0.1 MG/1
0.1 TABLET ORAL DAILY
Qty: 90 TABLET | Refills: 1 | Status: SHIPPED | OUTPATIENT
Start: 2019-11-07 | End: 2020-04-30

## 2019-12-02 ENCOUNTER — OFFICE VISIT (OUTPATIENT)
Dept: ENDOCRINOLOGY | Facility: CLINIC | Age: 29
End: 2019-12-02
Payer: COMMERCIAL

## 2019-12-02 ENCOUNTER — ANCILLARY PROCEDURE (OUTPATIENT)
Dept: ULTRASOUND IMAGING | Facility: CLINIC | Age: 29
End: 2019-12-02
Attending: INTERNAL MEDICINE
Payer: COMMERCIAL

## 2019-12-02 VITALS
SYSTOLIC BLOOD PRESSURE: 129 MMHG | DIASTOLIC BLOOD PRESSURE: 78 MMHG | BODY MASS INDEX: 20.41 KG/M2 | HEART RATE: 93 BPM | HEIGHT: 69 IN | WEIGHT: 137.8 LBS

## 2019-12-02 DIAGNOSIS — E25.0 CONGENITAL ADRENAL HYPERPLASIA (H): Primary | ICD-10-CM

## 2019-12-02 DIAGNOSIS — R39.15 URINARY URGENCY: ICD-10-CM

## 2019-12-02 DIAGNOSIS — E25.0 CONGENITAL ADRENAL HYPERPLASIA (H): ICD-10-CM

## 2019-12-02 DIAGNOSIS — D35.00 ADRENAL REST TUMOR: ICD-10-CM

## 2019-12-02 DIAGNOSIS — B49 FUNGAL INFECTION: ICD-10-CM

## 2019-12-02 LAB
ANION GAP SERPL CALCULATED.3IONS-SCNC: 4 MMOL/L (ref 3–14)
BUN SERPL-MCNC: 15 MG/DL (ref 7–30)
CALCIUM SERPL-MCNC: 8.4 MG/DL (ref 8.5–10.1)
CHLORIDE SERPL-SCNC: 105 MMOL/L (ref 94–109)
CO2 SERPL-SCNC: 28 MMOL/L (ref 20–32)
CREAT SERPL-MCNC: 0.68 MG/DL (ref 0.66–1.25)
GFR SERPL CREATININE-BSD FRML MDRD: >90 ML/MIN/{1.73_M2}
GLUCOSE SERPL-MCNC: 75 MG/DL (ref 70–99)
LH SERPL-ACNC: 5.8 IU/L (ref 1.5–9.3)
POTASSIUM SERPL-SCNC: 3.6 MMOL/L (ref 3.4–5.3)
PSA SERPL-MCNC: 0.44 UG/L (ref 0–4)
SODIUM SERPL-SCNC: 138 MMOL/L (ref 133–144)

## 2019-12-02 RX ORDER — SYRINGE W-NEEDLE,DISPOSAB,3 ML 23GX1"
100 SYRINGE, EMPTY DISPOSABLE MISCELLANEOUS PRN
Qty: 3 EACH | Refills: 3 | Status: SHIPPED | OUTPATIENT
Start: 2019-12-02 | End: 2023-11-20

## 2019-12-02 RX ORDER — CLOTRIMAZOLE 1 %
CREAM (GRAM) TOPICAL 2 TIMES DAILY
Qty: 15 G | Refills: 3 | Status: SHIPPED | OUTPATIENT
Start: 2019-12-02 | End: 2020-11-24

## 2019-12-02 ASSESSMENT — PAIN SCALES - GENERAL: PAINLEVEL: NO PAIN (0)

## 2019-12-02 ASSESSMENT — MIFFLIN-ST. JEOR: SCORE: 1575.44

## 2019-12-02 NOTE — PATIENT INSTRUCTIONS
You should increase the dose of hydrocortisone in situations such as febrile illness (38.5 C), gastroenteritis with dehydration, surgery accompanied by general anesthesia, and major trauma.   Please try to wear a medical alert bracelet or necklace.

## 2019-12-02 NOTE — LETTER
12/2/2019       RE: Laci Dsouza  [address] [facility] [address]     Dear Colleague,    Thank you for referring your patient, Laci Dsozua, to the [facility] ENDOCRINOLOGY at [facility]. Please see a copy of my visit note below.      Laci Dsouza is a 29 year old male with congenital adrenal hyperplasia, on hydrocortisone and Florinef therapy.  He was previously seen by Dr. Cordova and he established care with me in 2018.     The patient was diagnosed at 1 month of age and has been on lifelong hormone replacement.  Current dose of hydrocortisone is 20 mg in the morning, around 6 AM, when he wakes up, and 10 mg around 2 PM.  He takes the Florinef dose in the morning, 0.1 mg daily.   His weight is down 17 pounds in the last year.  He's been intentionally trying to lose weight.  He does weight training exercises at the gym 1-2weekly, 1 day of short high intensity cardio weekly and 1 day of long intensity cardio exercises per week.     He continues to have frequent urination.  Recently, he has been bothered more by this.  He feels the need to void before his bladder is full and he also notices some weak stream.  Overall, the urinary urgency has been present for many years.    The testicular US from 12/2018 revealed irregular similar-appearing hypoechoic foci in the left and right testes, measuring 1.1 and 2.7 cm, suggestive of testicular adrenal rests.    Past Medical History   Past Medical History:   Diagnosis Date     Anxiety      Aortic atresia or stenosis, congenital 12/2001    Repair 2001 after had MI;     Asthma, moderate persistent, well-controlled      Congenital adrenal hyperplasia (H) 1989    Dx at 1 mo of age; mother with CAH also (?)   Depression   No fractures  Asthma    Past Surgical History   Past Surgical History:   Procedure Laterality Date     aortic stenosis s/p surgical repair including cor reconstruction 2001         Current Medications    Current  Outpatient Medications:      albuterol (PROAIR HFA, PROVENTIL HFA, VENTOLIN HFA) 108 (90 BASE) MCG/ACT inhaler, Inhale 2 puffs into the lungs every 6 hours as needed for shortness of breath / dyspnea or wheezing, Disp: 1 Inhaler, Rfl: 1     albuterol (PROAIR RESPICLICK) 108 (90 Base) MCG/ACT inhaler, Inhale 2 puffs into the lungs every 4 hours as needed for shortness of breath / dyspnea or wheezing, Disp: 1 Inhaler, Rfl: 0     buPROPion (WELLBUTRIN SR) 100 MG 12 hr tablet, Take 1 tablet (100 mg) by mouth daily, Disp: 90 tablet, Rfl: 0     fludrocortisone (FLORINEF) 0.1 MG tablet, Take 1 tablet (0.1 mg) by mouth daily, Disp: 90 tablet, Rfl: 1     hydrocortisone (CORTEF) 10 MG tablet, 2 tabs in am and 1 tab in afternoon/evening, Disp: 270 tablet, Rfl: 3     SYMBICORT 160-4.5 MCG/ACT Inhaler, Inhale 2 puffs into the lungs 2 times daily, Disp: 3 Inhaler, Rfl: 1     venlafaxine (EFFEXOR-XR) 150 MG 24 hr capsule, Take 1 capsule (150 mg) by mouth daily, Disp: 90 capsule, Rfl: 0     venlafaxine (EFFEXOR-XR) 75 MG 24 hr capsule, Take 1 capsule (75 mg) by mouth daily, Disp: 90 capsule, Rfl: 0  Takes a MVI for men     Family History   Mother - congenital adrenal hyperplasia. Father - CVA in his 60s.     Social History  Single. No children. He might be interested in adopting, in the future.  Former smoker, 1/2 PPD for 10 years. Drinks 1-4 alcoholic drinks a week.  Denies using illicit drugs. 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     Review of Systems   Systemic:             No significant fatigue   Eye:                      No eye symptoms   Lizeth-Laryngeal:     No lizeth-laryngeal symptoms, no dysphagia, no hoarseness, no cough     Breast:                  No breast symptoms  Cardiovascular:    No cardiovascular symptoms, no CP or palpitations   Pulmonary:           No pulmonary symptoms, no SOB or cough    Gastrointestinal:   No gastrointestinal symptoms, no diarrhea or constipation   Genitourinary:        "increased urination - for many years; urinates ~1-2 times a night; drinks 8 glasses of 8 oz water daily, by habit; some weak stream    Endocrine:            No endocrine symptoms, no cold or heat intolerance   Neurological:        No neurological symptoms, no headaches, no tremor, no numbness or tingling sensation, no dizziness   Musculoskeletal:  Lower back pain   Skin:                     no dry skin, no hair falling out; no changes of the facial hair; right foot sole has been itchy   Psychological:      Depression - longstanding and controlled                 Vital Signs     Previous Weights:    Wt Readings from Last 10 Encounters:   12/02/19 62.5 kg (137 lb 12.8 oz)   12/20/18 70.2 kg (154 lb 12.8 oz)   12/03/18 68.9 kg (152 lb)   12/13/17 71.4 kg (157 lb 4.8 oz)   07/06/16 64.4 kg (142 lb)   04/22/15 61.9 kg (136 lb 6.4 oz)   11/08/13 61 kg (134 lb 8 oz)   12/12/12 60.8 kg (134 lb)        /78   Pulse 93   Ht 1.753 m (5' 9\")   Wt 62.5 kg (137 lb 12.8 oz)   BMI 20.35 kg/m       Physical Exam  General Appearance: he is well developed, well nourished and in no distress     Eyes:  conjutivae and extra-ocular motions are normal.                                    pupils round and reactive to light, no lid lag, no stare    HEENT:   oropharynx clear and moist, no JVD, no bruits      no thyromegaly, no palpable nodules   Cardiovascular:  regular rhythm, no murmurs, distal pulse palpable, no edema  Respiratory:        chest clear, no rales, no rhonchi   Gastrointestinal:  abdomen soft, non-tender, non-distended, normal bowel sounds,    no organomegaly  Musculoskeletal:  normal tone and strength  Psychological:          affect and judgment normal  Skin:  normal male hair pattern; no gynecomastia; ? Extra nipple R lower chest   Mild erythematous rash on the sole of the right foot  Neurological:  reflexes normal and symmetric, no resting tremor     Lab Results  I reviewed prior lab results documented in Epic.  TSH "   Date Value Ref Range Status   08/07/2008 0.34 mcU/mL Final     Component      Latest Ref Rng & Units 12/3/2018 3/5/2019   Sodium      133 - 144 mmol/L 139    Potassium      3.4 - 5.3 mmol/L 3.6    Chloride      94 - 109 mmol/L 104    Carbon Dioxide      20 - 32 mmol/L 26    Anion Gap      3 - 14 mmol/L 8    Glucose      70 - 99 mg/dL 60 (L)    Urea Nitrogen      7 - 30 mg/dL 11    Creatinine      0.66 - 1.25 mg/dL 0.62 (L)    GFR Estimate      >60 mL/min/1.7m2 >90    GFR Estimate If Black      >60 mL/min/1.7m2 >90    Calcium      8.5 - 10.1 mg/dL 9.0    Testosterone Total      240 - 950 ng/dL 223 (L) 460   Sex Hormone Binding Globulin      11 - 80 nmol/L 42 47   Free Testosterone Calculated      4.7 - 24.4 ng/dL 3.67 (L) 7.57   25 OH Vit D2      ug/L <5    25 OH Vit D3      ug/L 49    25 OH Vit D total      20 - 75 ug/L <54    Lutropin      1.5 - 9.3 IU/L 2.4 1.4 (L)   DHEA Sulfate      80 - 560 ug/dL 89    Androstenedione      0.330 - 1.340 ng/mL 2.350 (H)    Albumin      3.4 - 5.0 g/dL 4.2    17-OH Progesterone      ng/dL 2,366    Cortisol Serum      4 - 22 ug/dL  24.8 (H)   Adrenal Corticotropin      <47 pg/mL  57 (H)   FSH      0.7 - 10.8 IU/L  3.3     Assessment     1.  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  It is interesting that the patient has a family history of congenital adrenal hyperplasia in his mother.  He is not interested in conceiving.  Clinically, he is asymptomatic.    Recommendations:  Schedule an ultrasound of the testes, to evaluate the progression of the adrenal rest tumors over the last year (first US done in 2018)  Check lab work: Free and total testosterone, LH, androstenedione, renin, 17 hydroxyprogesterone  Of note that he took the morning dose of HC at 5:45 AM, today.    2.  Urinary urgency  Check PSA. Advised to consult urology.     3. Tinea pedis   Recommended treatment with clotrimazole. He doesn't have a PCP but he plans to  establish care here, as he is interested in pursuing screening tests for HIV.     Orders Placed This Encounter   Procedures     US Testicular & Scrotum w Doppler Ltd     Lutropin     Testosterone Free and Total     Androstenedione     Renin activity     Basic metabolic panel     17 OH progesterone     PSA tumor marker     Urology IP Consult                        Again, thank you for allowing me to participate in the care of your patient.      Sincerely,    Deja Warner MD

## 2019-12-02 NOTE — PROGRESS NOTES
Laci Dsouza is a 29 year old male with congenital adrenal hyperplasia, on hydrocortisone and Florinef therapy.  He was previously seen by Dr. Cordova and he established care with me in 2018.     The patient was diagnosed at 1 month of age and has been on lifelong hormone replacement.  Current dose of hydrocortisone is 20 mg in the morning, around 6 AM, when he wakes up, and 10 mg around 2 PM.  He takes the Florinef dose in the morning, 0.1 mg daily.   His weight is down 17 pounds in the last year.  He's been intentionally trying to lose weight.  He does weight training exercises at the gym 1-2weekly, 1 day of short high intensity cardio weekly and 1 day of long intensity cardio exercises per week.     He continues to have frequent urination.  Recently, he has been bothered more by this.  He feels the need to void before his bladder is full and he also notices some weak stream.  Overall, the urinary urgency has been present for many years.    The testicular US from 12/2018 revealed irregular similar-appearing hypoechoic foci in the left and right testes, measuring 1.1 and 2.7 cm, suggestive of testicular adrenal rests.    Past Medical History   Past Medical History:   Diagnosis Date     Anxiety      Aortic atresia or stenosis, congenital 12/2001    Repair 2001 after had MI;     Asthma, moderate persistent, well-controlled      Congenital adrenal hyperplasia (H) 1989    Dx at 1 mo of age; mother with CAH also (?)   Depression   No fractures  Asthma    Past Surgical History   Past Surgical History:   Procedure Laterality Date     aortic stenosis s/p surgical repair including cor reconstruction 2001         Current Medications    Current Outpatient Medications:      albuterol (PROAIR HFA, PROVENTIL HFA, VENTOLIN HFA) 108 (90 BASE) MCG/ACT inhaler, Inhale 2 puffs into the lungs every 6 hours as needed for shortness of breath / dyspnea or wheezing, Disp: 1 Inhaler, Rfl: 1     albuterol (PROAIR RESPICLICK) 108 (90  Base) MCG/ACT inhaler, Inhale 2 puffs into the lungs every 4 hours as needed for shortness of breath / dyspnea or wheezing, Disp: 1 Inhaler, Rfl: 0     buPROPion (WELLBUTRIN SR) 100 MG 12 hr tablet, Take 1 tablet (100 mg) by mouth daily, Disp: 90 tablet, Rfl: 0     fludrocortisone (FLORINEF) 0.1 MG tablet, Take 1 tablet (0.1 mg) by mouth daily, Disp: 90 tablet, Rfl: 1     hydrocortisone (CORTEF) 10 MG tablet, 2 tabs in am and 1 tab in afternoon/evening, Disp: 270 tablet, Rfl: 3     SYMBICORT 160-4.5 MCG/ACT Inhaler, Inhale 2 puffs into the lungs 2 times daily, Disp: 3 Inhaler, Rfl: 1     venlafaxine (EFFEXOR-XR) 150 MG 24 hr capsule, Take 1 capsule (150 mg) by mouth daily, Disp: 90 capsule, Rfl: 0     venlafaxine (EFFEXOR-XR) 75 MG 24 hr capsule, Take 1 capsule (75 mg) by mouth daily, Disp: 90 capsule, Rfl: 0  Takes a MVI for men     Family History   Mother - congenital adrenal hyperplasia. Father - CVA in his 60s.     Social History  Single. No children. He might be interested in adopting, in the future.  Former smoker, 1/2 PPD for 10 years. Drinks 1-4 alcoholic drinks a week.  Denies using illicit drugs. 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     Review of Systems   Systemic:             No significant fatigue   Eye:                      No eye symptoms   Lizeth-Laryngeal:     No lizeth-laryngeal symptoms, no dysphagia, no hoarseness, no cough     Breast:                  No breast symptoms  Cardiovascular:    No cardiovascular symptoms, no CP or palpitations   Pulmonary:           No pulmonary symptoms, no SOB or cough    Gastrointestinal:   No gastrointestinal symptoms, no diarrhea or constipation   Genitourinary:       increased urination - for many years; urinates ~1-2 times a night; drinks 8 glasses of 8 oz water daily, by habit; some weak stream    Endocrine:            No endocrine symptoms, no cold or heat intolerance   Neurological:        No neurological symptoms, no headaches, no tremor, no  "numbness or tingling sensation, no dizziness   Musculoskeletal:  Lower back pain   Skin:                     no dry skin, no hair falling out; no changes of the facial hair; right foot sole has been itchy   Psychological:      Depression - longstanding and controlled                 Vital Signs     Previous Weights:    Wt Readings from Last 10 Encounters:   12/02/19 62.5 kg (137 lb 12.8 oz)   12/20/18 70.2 kg (154 lb 12.8 oz)   12/03/18 68.9 kg (152 lb)   12/13/17 71.4 kg (157 lb 4.8 oz)   07/06/16 64.4 kg (142 lb)   04/22/15 61.9 kg (136 lb 6.4 oz)   11/08/13 61 kg (134 lb 8 oz)   12/12/12 60.8 kg (134 lb)        /78   Pulse 93   Ht 1.753 m (5' 9\")   Wt 62.5 kg (137 lb 12.8 oz)   BMI 20.35 kg/m      Physical Exam  General Appearance: he is well developed, well nourished and in no distress     Eyes:  conjutivae and extra-ocular motions are normal.                                    pupils round and reactive to light, no lid lag, no stare    HEENT:   oropharynx clear and moist, no JVD, no bruits      no thyromegaly, no palpable nodules   Cardiovascular:  regular rhythm, no murmurs, distal pulse palpable, no edema  Respiratory:        chest clear, no rales, no rhonchi   Gastrointestinal:  abdomen soft, non-tender, non-distended, normal bowel sounds,    no organomegaly  Musculoskeletal:  normal tone and strength  Psychological:          affect and judgment normal  Skin:  normal male hair pattern; no gynecomastia; ? Extra nipple R lower chest   Mild erythematous rash on the sole of the right foot  Neurological:  reflexes normal and symmetric, no resting tremor     Lab Results  I reviewed prior lab results documented in Epic.  TSH   Date Value Ref Range Status   08/07/2008 0.34 mcU/mL Final     Component      Latest Ref Rng & Units 12/3/2018 3/5/2019   Sodium      133 - 144 mmol/L 139    Potassium      3.4 - 5.3 mmol/L 3.6    Chloride      94 - 109 mmol/L 104    Carbon Dioxide      20 - 32 mmol/L 26    Anion " Gap      3 - 14 mmol/L 8    Glucose      70 - 99 mg/dL 60 (L)    Urea Nitrogen      7 - 30 mg/dL 11    Creatinine      0.66 - 1.25 mg/dL 0.62 (L)    GFR Estimate      >60 mL/min/1.7m2 >90    GFR Estimate If Black      >60 mL/min/1.7m2 >90    Calcium      8.5 - 10.1 mg/dL 9.0    Testosterone Total      240 - 950 ng/dL 223 (L) 460   Sex Hormone Binding Globulin      11 - 80 nmol/L 42 47   Free Testosterone Calculated      4.7 - 24.4 ng/dL 3.67 (L) 7.57   25 OH Vit D2      ug/L <5    25 OH Vit D3      ug/L 49    25 OH Vit D total      20 - 75 ug/L <54    Lutropin      1.5 - 9.3 IU/L 2.4 1.4 (L)   DHEA Sulfate      80 - 560 ug/dL 89    Androstenedione      0.330 - 1.340 ng/mL 2.350 (H)    Albumin      3.4 - 5.0 g/dL 4.2    17-OH Progesterone      ng/dL 2,366    Cortisol Serum      4 - 22 ug/dL  24.8 (H)   Adrenal Corticotropin      <47 pg/mL  57 (H)   FSH      0.7 - 10.8 IU/L  3.3     Assessment     1.  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  It is interesting that the patient has a family history of congenital adrenal hyperplasia in his mother.  He is not interested in conceiving.  Clinically, he is asymptomatic.    Recommendations:  Schedule an ultrasound of the testes, to evaluate the progression of the adrenal rest tumors over the last year (first US done in 2018)  Check lab work: Free and total testosterone, LH, androstenedione, renin, 17 hydroxyprogesterone  Of note that he took the morning dose of HC at 5:45 AM, today.    2.  Urinary urgency  Check PSA. Advised to consult urology.     3. Tinea pedis   Recommended treatment with clotrimazole. He doesn't have a PCP but he plans to establish care here, as he is interested in pursuing screening tests for HIV.     Orders Placed This Encounter   Procedures     US Testicular & Scrotum w Doppler Ltd     Lutropin     Testosterone Free and Total     Androstenedione     Renin activity     Basic metabolic panel     17 OH  progesterone     PSA tumor marker     Urology IP Consult

## 2019-12-02 NOTE — NURSING NOTE
Chief Complaint   Patient presents with     RECHECK     Congenital Adrenal Hyperplasia     Ping Alvarado MA

## 2019-12-02 NOTE — LETTER
12/2/2019      RE: Laci Dsouza  [address] [facility] [address]       Laci Dsouza is a 29 year old male with congenital adrenal hyperplasia, on hydrocortisone and Florinef therapy.  He was previously seen by Dr. Cordova and he established care with me in 2018.     The patient was diagnosed at 1 month of age and has been on lifelong hormone replacement.  Current dose of hydrocortisone is 20 mg in the morning, around 6 AM, when he wakes up, and 10 mg around 2 PM.  He takes the Florinef dose in the morning, 0.1 mg daily.   His weight is down 17 pounds in the last year.  He's been intentionally trying to lose weight.  He does weight training exercises at the gym 1-2weekly, 1 day of short high intensity cardio weekly and 1 day of long intensity cardio exercises per week.     He continues to have frequent urination.  Recently, he has been bothered more by this.  He feels the need to void before his bladder is full and he also notices some weak stream.  Overall, the urinary urgency has been present for many years.    The testicular US from 12/2018 revealed irregular similar-appearing hypoechoic foci in the left and right testes, measuring 1.1 and 2.7 cm, suggestive of testicular adrenal rests.    Past Medical History   Past Medical History:   Diagnosis Date     Anxiety      Aortic atresia or stenosis, congenital 12/2001    Repair 2001 after had MI;     Asthma, moderate persistent, well-controlled      Congenital adrenal hyperplasia (H) 1989    Dx at 1 mo of age; mother with CAH also (?)   Depression   No fractures  Asthma    Past Surgical History   Past Surgical History:   Procedure Laterality Date     aortic stenosis s/p surgical repair including cor reconstruction 2001         Current Medications    Current Outpatient Medications:      albuterol (PROAIR HFA, PROVENTIL HFA, VENTOLIN HFA) 108 (90 BASE) MCG/ACT inhaler, Inhale 2 puffs into the lungs every 6 hours as needed for shortness of breath / dyspnea or  wheezing, Disp: 1 Inhaler, Rfl: 1     albuterol (PROAIR RESPICLICK) 108 (90 Base) MCG/ACT inhaler, Inhale 2 puffs into the lungs every 4 hours as needed for shortness of breath / dyspnea or wheezing, Disp: 1 Inhaler, Rfl: 0     buPROPion (WELLBUTRIN SR) 100 MG 12 hr tablet, Take 1 tablet (100 mg) by mouth daily, Disp: 90 tablet, Rfl: 0     fludrocortisone (FLORINEF) 0.1 MG tablet, Take 1 tablet (0.1 mg) by mouth daily, Disp: 90 tablet, Rfl: 1     hydrocortisone (CORTEF) 10 MG tablet, 2 tabs in am and 1 tab in afternoon/evening, Disp: 270 tablet, Rfl: 3     SYMBICORT 160-4.5 MCG/ACT Inhaler, Inhale 2 puffs into the lungs 2 times daily, Disp: 3 Inhaler, Rfl: 1     venlafaxine (EFFEXOR-XR) 150 MG 24 hr capsule, Take 1 capsule (150 mg) by mouth daily, Disp: 90 capsule, Rfl: 0     venlafaxine (EFFEXOR-XR) 75 MG 24 hr capsule, Take 1 capsule (75 mg) by mouth daily, Disp: 90 capsule, Rfl: 0  Takes a MVI for men     Family History   Mother - congenital adrenal hyperplasia. Father - CVA in his 60s.     Social History  Single. No children. He might be interested in adopting, in the future.  Former smoker, 1/2 PPD for 10 years. Drinks 1-4 alcoholic drinks a week.  Denies using illicit drugs. 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     Review of Systems   Systemic:             No significant fatigue   Eye:                      No eye symptoms   Lizeth-Laryngeal:     No lizeth-laryngeal symptoms, no dysphagia, no hoarseness, no cough     Breast:                  No breast symptoms  Cardiovascular:    No cardiovascular symptoms, no CP or palpitations   Pulmonary:           No pulmonary symptoms, no SOB or cough    Gastrointestinal:   No gastrointestinal symptoms, no diarrhea or constipation   Genitourinary:       increased urination - for many years; urinates ~1-2 times a night; drinks 8 glasses of 8 oz water daily, by habit; some weak stream    Endocrine:            No endocrine symptoms, no cold or heat intolerance  "  Neurological:        No neurological symptoms, no headaches, no tremor, no numbness or tingling sensation, no dizziness   Musculoskeletal:  Lower back pain   Skin:                     no dry skin, no hair falling out; no changes of the facial hair; right foot sole has been itchy   Psychological:      Depression - longstanding and controlled                 Vital Signs     Previous Weights:    Wt Readings from Last 10 Encounters:   12/02/19 62.5 kg (137 lb 12.8 oz)   12/20/18 70.2 kg (154 lb 12.8 oz)   12/03/18 68.9 kg (152 lb)   12/13/17 71.4 kg (157 lb 4.8 oz)   07/06/16 64.4 kg (142 lb)   04/22/15 61.9 kg (136 lb 6.4 oz)   11/08/13 61 kg (134 lb 8 oz)   12/12/12 60.8 kg (134 lb)        /78   Pulse 93   Ht 1.753 m (5' 9\")   Wt 62.5 kg (137 lb 12.8 oz)   BMI 20.35 kg/m       Physical Exam  General Appearance: he is well developed, well nourished and in no distress     Eyes:  conjutivae and extra-ocular motions are normal.                                    pupils round and reactive to light, no lid lag, no stare    HEENT:   oropharynx clear and moist, no JVD, no bruits      no thyromegaly, no palpable nodules   Cardiovascular:  regular rhythm, no murmurs, distal pulse palpable, no edema  Respiratory:        chest clear, no rales, no rhonchi   Gastrointestinal:  abdomen soft, non-tender, non-distended, normal bowel sounds,    no organomegaly  Musculoskeletal:  normal tone and strength  Psychological:          affect and judgment normal  Skin:  normal male hair pattern; no gynecomastia; ? Extra nipple R lower chest   Mild erythematous rash on the sole of the right foot  Neurological:  reflexes normal and symmetric, no resting tremor     Lab Results  I reviewed prior lab results documented in Epic.  TSH   Date Value Ref Range Status   08/07/2008 0.34 mcU/mL Final     Component      Latest Ref Rng & Units 12/3/2018 3/5/2019   Sodium      133 - 144 mmol/L 139    Potassium      3.4 - 5.3 mmol/L 3.6  "   Chloride      94 - 109 mmol/L 104    Carbon Dioxide      20 - 32 mmol/L 26    Anion Gap      3 - 14 mmol/L 8    Glucose      70 - 99 mg/dL 60 (L)    Urea Nitrogen      7 - 30 mg/dL 11    Creatinine      0.66 - 1.25 mg/dL 0.62 (L)    GFR Estimate      >60 mL/min/1.7m2 >90    GFR Estimate If Black      >60 mL/min/1.7m2 >90    Calcium      8.5 - 10.1 mg/dL 9.0    Testosterone Total      240 - 950 ng/dL 223 (L) 460   Sex Hormone Binding Globulin      11 - 80 nmol/L 42 47   Free Testosterone Calculated      4.7 - 24.4 ng/dL 3.67 (L) 7.57   25 OH Vit D2      ug/L <5    25 OH Vit D3      ug/L 49    25 OH Vit D total      20 - 75 ug/L <54    Lutropin      1.5 - 9.3 IU/L 2.4 1.4 (L)   DHEA Sulfate      80 - 560 ug/dL 89    Androstenedione      0.330 - 1.340 ng/mL 2.350 (H)    Albumin      3.4 - 5.0 g/dL 4.2    17-OH Progesterone      ng/dL 2,366    Cortisol Serum      4 - 22 ug/dL  24.8 (H)   Adrenal Corticotropin      <47 pg/mL  57 (H)   FSH      0.7 - 10.8 IU/L  3.3     Assessment     1.  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  It is interesting that the patient has a family history of congenital adrenal hyperplasia in his mother.  He is not interested in conceiving.  Clinically, he is asymptomatic.    Recommendations:  Schedule an ultrasound of the testes, to evaluate the progression of the adrenal rest tumors over the last year (first US done in 2018)  Check lab work: Free and total testosterone, LH, androstenedione, renin, 17 hydroxyprogesterone  Of note that he took the morning dose of HC at 5:45 AM, today.    2.  Urinary urgency  Check PSA. Advised to consult urology.     3. Tinea pedis   Recommended treatment with clotrimazole. He doesn't have a PCP but he plans to establish care here, as he is interested in pursuing screening tests for HIV.     Orders Placed This Encounter   Procedures     US Testicular & Scrotum w Doppler Ltd     Lutropin     Testosterone Free and  Total     Androstenedione     Renin activity     Basic metabolic panel     17 OH progesterone     PSA tumor marker     Urology IP Consult                        Deja Warner MD

## 2019-12-03 LAB
SHBG SERPL-SCNC: 45 NMOL/L (ref 11–80)
TESTOST FREE SERPL-MCNC: 6.9 NG/DL (ref 4.7–24.4)
TESTOST SERPL-MCNC: 413 NG/DL (ref 240–950)

## 2019-12-04 DIAGNOSIS — F41.1 GAD (GENERALIZED ANXIETY DISORDER): ICD-10-CM

## 2019-12-04 LAB — RENIN PLAS-CCNC: 0.9 NG/ML/HR

## 2019-12-04 RX ORDER — VENLAFAXINE HYDROCHLORIDE 150 MG/1
150 CAPSULE, EXTENDED RELEASE ORAL DAILY
Qty: 90 CAPSULE | Refills: 0 | Status: SHIPPED | OUTPATIENT
Start: 2019-12-04 | End: 2020-05-06

## 2019-12-04 NOTE — TELEPHONE ENCOUNTER
VENLAFAXINE HCL  MG CAP    Last Written Prescription Date:  9/13/2019  Last Fill Quantity: 90,   # refills: 0  Last Office Visit : 12/20/2018  Future Office visit:  12/24/2019  90 tabs, 0 Refills sent to pharmacy.  Pt has office visit on 12/24/2019.    Deja Gomez RN  Central Triage Red Flags/Med Refills

## 2019-12-05 LAB — ANDROST SERPL-MCNC: 1.58 NG/ML (ref 0.33–1.34)

## 2019-12-09 LAB — 17OHP SERPL-MCNC: 1115 NG/DL

## 2019-12-16 DIAGNOSIS — F41.1 GAD (GENERALIZED ANXIETY DISORDER): ICD-10-CM

## 2019-12-16 RX ORDER — VENLAFAXINE HYDROCHLORIDE 75 MG/1
75 CAPSULE, EXTENDED RELEASE ORAL DAILY
Qty: 90 CAPSULE | Refills: 1 | Status: SHIPPED | OUTPATIENT
Start: 2019-12-16 | End: 2020-06-08

## 2019-12-16 NOTE — TELEPHONE ENCOUNTER
Health Call Center    Phone Message    May a detailed message be left on voicemail: yes    Reason for Call: Medication Refill Request    Has the patient contacted the pharmacy for the refill? Yes   Name of medication being requested: VENLAFAXINE HCL  MG CAP  -  MG but the 75 MG CAP  Provider who prescribed the medication: Andrés  Pharmacy:  [facility] #[id number] - [place], [place] - [address] AT [facility]    Date medication is needed: ASAP      Action Taken: Message routed to:  [facility] ([facility]): Med Refills

## 2019-12-23 DIAGNOSIS — E27.40 ADRENAL INSUFFICIENCY (H): ICD-10-CM

## 2019-12-24 ENCOUNTER — OFFICE VISIT (OUTPATIENT)
Dept: FAMILY MEDICINE | Facility: CLINIC | Age: 29
End: 2019-12-24
Payer: COMMERCIAL

## 2019-12-24 VITALS
OXYGEN SATURATION: 96 % | DIASTOLIC BLOOD PRESSURE: 73 MMHG | WEIGHT: 142.3 LBS | TEMPERATURE: 98 F | RESPIRATION RATE: 16 BRPM | SYSTOLIC BLOOD PRESSURE: 124 MMHG | HEART RATE: 78 BPM | HEIGHT: 69 IN | BODY MASS INDEX: 21.08 KG/M2

## 2019-12-24 DIAGNOSIS — Z11.3 SCREEN FOR STD (SEXUALLY TRANSMITTED DISEASE): ICD-10-CM

## 2019-12-24 DIAGNOSIS — Z11.4 SCREENING FOR HIV (HUMAN IMMUNODEFICIENCY VIRUS): ICD-10-CM

## 2019-12-24 DIAGNOSIS — R35.0 URINARY FREQUENCY: ICD-10-CM

## 2019-12-24 DIAGNOSIS — R04.0 BLEEDING FROM THE NOSE: ICD-10-CM

## 2019-12-24 DIAGNOSIS — Z13.220 SCREENING FOR HYPERLIPIDEMIA: ICD-10-CM

## 2019-12-24 DIAGNOSIS — F41.9 ANXIETY: ICD-10-CM

## 2019-12-24 DIAGNOSIS — J45.40 ASTHMA, MODERATE PERSISTENT, WELL-CONTROLLED: ICD-10-CM

## 2019-12-24 DIAGNOSIS — Z00.00 ROUTINE GENERAL MEDICAL EXAMINATION AT A HEALTH CARE FACILITY: Primary | ICD-10-CM

## 2019-12-24 LAB
ALBUMIN UR-MCNC: NEGATIVE MG/DL
APPEARANCE UR: NORMAL
BILIRUB UR QL STRIP: NEGATIVE
COLOR UR AUTO: YELLOW
GLUCOSE UR STRIP-MCNC: NEGATIVE MG/DL
HGB UR QL STRIP: NEGATIVE
KETONES UR STRIP-MCNC: NEGATIVE MG/DL
LEUKOCYTE ESTERASE UR QL STRIP: NEGATIVE
NITRATE UR QL: NEGATIVE
PH UR STRIP: 5 PH (ref 5–7)
RBC #/AREA URNS AUTO: <1 /HPF (ref 0–2)
SOURCE: NORMAL
SP GR UR STRIP: 1.02 (ref 1–1.03)
SQUAMOUS #/AREA URNS AUTO: <1 /HPF (ref 0–1)
UROBILINOGEN UR STRIP-MCNC: 0 MG/DL (ref 0–2)
WBC #/AREA URNS AUTO: <1 /HPF (ref 0–5)

## 2019-12-24 RX ORDER — BUPROPION HYDROCHLORIDE 100 MG/1
100 TABLET, EXTENDED RELEASE ORAL DAILY
Qty: 90 TABLET | Refills: 1 | Status: SHIPPED | OUTPATIENT
Start: 2019-12-24 | End: 2020-06-22

## 2019-12-24 RX ORDER — BUDESONIDE AND FORMOTEROL FUMARATE DIHYDRATE 160; 4.5 UG/1; UG/1
2 AEROSOL RESPIRATORY (INHALATION) 2 TIMES DAILY
Qty: 3 INHALER | Refills: 1 | Status: SHIPPED | OUTPATIENT
Start: 2019-12-24 | End: 2020-08-19

## 2019-12-24 RX ORDER — HYDROCORTISONE 10 MG/1
TABLET ORAL
Qty: 270 TABLET | Refills: 3 | Status: SHIPPED | OUTPATIENT
Start: 2019-12-24 | End: 2020-12-19

## 2019-12-24 ASSESSMENT — PAIN SCALES - GENERAL: PAINLEVEL: NO PAIN (0)

## 2019-12-24 ASSESSMENT — MIFFLIN-ST. JEOR: SCORE: 1595.85

## 2019-12-24 NOTE — NURSING NOTE
"29 year old  Chief Complaint   Patient presents with     Physical     Pt comes in for a physical exam.       Blood pressure 124/73, pulse 78, temperature 98  F (36.7  C), temperature source Oral, resp. rate 16, height 1.753 m (5' 9\"), weight 64.5 kg (142 lb 4.8 oz), SpO2 96 %. Body mass index is 21.01 kg/m .  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), simple virilizing (H)     Congenital adrenal hyperplasia (H)     Anxiety     Aortic atresia or stenosis, congenital     Asthma, moderate persistent, well-controlled       Wt Readings from Last 2 Encounters:   12/24/19 64.5 kg (142 lb 4.8 oz)   12/02/19 62.5 kg (137 lb 12.8 oz)     BP Readings from Last 3 Encounters:   12/24/19 124/73   12/02/19 129/78   12/20/18 136/74       Allergies   Allergen Reactions     Ceclor [Cefaclor Monohydrate]      Unknown       Sulfa Drugs      Unknown         Current Outpatient Medications   Medication     albuterol (PROAIR HFA, PROVENTIL HFA, VENTOLIN HFA) 108 (90 BASE) MCG/ACT inhaler     albuterol (PROAIR RESPICLICK) 108 (90 Base) MCG/ACT inhaler     buPROPion (WELLBUTRIN SR) 100 MG 12 hr tablet     clotrimazole (LOTRIMIN) 1 % external cream     fludrocortisone (FLORINEF) 0.1 MG tablet     hydrocortisone (CORTEF) 10 MG tablet     SYMBICORT 160-4.5 MCG/ACT Inhaler     Syringe/Needle, Disp, (SYRINGE LUER LOCK) 23G X 1\" 3 ML MISC     venlafaxine (EFFEXOR-XR) 150 MG 24 hr capsule     venlafaxine (EFFEXOR-XR) 75 MG 24 hr capsule     hydrocortisone sodium succinate PF (SOLU-CORTEF) 100 MG injection     No current facility-administered medications for this visit.        Social History     Tobacco Use     Smoking status: Current Every Day Smoker     Smokeless tobacco: Never Used   Substance Use Topics     Alcohol use: Yes     Frequency: 2-4 times a month     Drinks per session: 3 or 4     Drug use: None       Honoring PEX Card " Care Directive Guide offered to patient at time of visit.    Health Maintenance Due   Topic Date Due     PREVENTIVE CARE VISIT  1989     ASTHMA ACTION PLAN  1989     ASTHMA CONTROL TEST  1989     DTAP/TDAP/TD IMMUNIZATION (1 - Tdap) 07/21/2000     INFLUENZA VACCINE (1) 09/01/2019         There is no immunization history on file for this patient.    No results found for: PAP      Recent Labs   Lab Test 12/02/19  0831 12/03/18  0824   CR 0.68 0.62*   GFRESTIMATED >90 >90   GFRESTBLACK >90 >90   ALBUMIN  --  4.2   POTASSIUM 3.6 3.6       PHQ-2 ( 1999 Pfizer) 12/2/2019   Q1: Little interest or pleasure in doing things 0   Q2: Feeling down, depressed or hopeless 0   PHQ-2 Score 0       PHQ-9 SCORE 12/20/2018   PHQ-9 Total Score 5       KENNY-7 SCORE 12/20/2018   Total Score 7       ACT Total Scores 12/20/2018   ACT TOTAL SCORE (Goal Greater than or Equal to 20) 25       Rafael Delarosa, ROSSY  December 24, 2019 7:11 AM

## 2019-12-24 NOTE — PROGRESS NOTES
3  SUBJECTIVE:   CC: Laci Dsouza is an 29 year old male who presents for preventive health visit.     Healthy Habits:    Do you get at least three servings of calcium containing foods daily (dairy, green leafy vegetables, etc.)? yes    Amount of exercise or daily activities, outside of work: working out 4 days a week; cardio x 2 days, strength x 2 days    Problems taking medications regularly No    Medication side effects: No    Have you had an eye exam in the past two years? No     Do you see a dentist twice per year? yes    Do you have sleep apnea, excessive snoring or daytime drowsiness?no    2 years ago - quick smoking     Patient has had increased urinary frequency for a few years. He has tried to cut back caffeine and alcohol.  He has noticed this helps but still has urinary frequency.  He feels like he is always looking for a bathroom and urinates more frequently than he should.  He spoke to his endocrinologist about this recently who ordered a PSA which was normal. He feels like if he does not find a bathroom immediately he would have urinary incontinence.   PSA   Date Value Ref Range Status   12/02/2019 0.44 0 - 4 ug/L Final     Comment:     Assay Method:  Chemiluminescence using Siemens Vista analyzer   An ultrasound of testes and consult with urology was ordered by urology.      Recently has been going to counseling which he believes has been helpful.    Asthma has been under good control. ACT score is 24.  Using his symbicort with rare use of albuterol inhaler. He uses albuterol before exercise.          He was seen by endocrinology on 12/2/2019 for congenital adrenal hyperplasia.    He is due for a cardiology follow-up for aortic stenosis.  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    Would like STI testing.    Is not fasting today, so will return for  fasting lab work    Concerns with frequently nose bleeds in the winter that are hard to stop.     Today's PHQ-2 Score:   PHQ-2 ( 1999 Pfizer) 12/2/2019   Q1: Little interest or pleasure in doing things 0   Q2: Feeling down, depressed or hopeless 0   PHQ-2 Score 0       Abuse: Current or Past(Physical, Sexual or Emotional)- No  Do you feel safe in your environment? Yes        Social History     Tobacco Use     Smoking status: Former Smoker     Types: Cigarettes     Smokeless tobacco: Never Used   Substance Use Topics     Alcohol use: Yes     Frequency: 2-4 times a month     Drinks per session: 3 or 4     If you drink alcohol do you typically have >3 drinks per day or >7 drinks per week? No                      Last PSA:   PSA   Date Value Ref Range Status   12/02/2019 0.44 0 - 4 ug/L Final     Comment:     Assay Method:  Chemiluminescence using Siemens Vista analyzer       Reviewed orders with patient. 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 CAH), simple virilizing (H)     Congenital adrenal hyperplasia (H)     Anxiety     Aortic atresia or stenosis, congenital     Asthma, moderate persistent, well-controlled     Past Surgical History:   Procedure Laterality Date     aortic stenosis s/p surgical repair including cor reconstruction 2001         Social History     Tobacco Use     Smoking status: Former Smoker     Types: Cigarettes     Smokeless tobacco: Never Used   Substance Use Topics     Alcohol use: Yes     Frequency: 2-4 times a month     Drinks per session: 3 or 4     Family  "History   Adopted: Yes   Problem Relation Age of Onset     Bipolar Disorder Father          Current Outpatient Medications   Medication Sig Dispense Refill     albuterol (PROAIR HFA, PROVENTIL HFA, VENTOLIN HFA) 108 (90 BASE) MCG/ACT inhaler Inhale 2 puffs into the lungs every 6 hours as needed for shortness of breath / dyspnea or wheezing 1 Inhaler 1     albuterol (PROAIR RESPICLICK) 108 (90 Base) MCG/ACT inhaler Inhale 2 puffs into the lungs every 4 hours as needed for shortness of breath / dyspnea or wheezing 1 Inhaler 0     buPROPion (WELLBUTRIN SR) 100 MG 12 hr tablet Take 1 tablet (100 mg) by mouth daily 90 tablet 1     clotrimazole (LOTRIMIN) 1 % external cream Apply topically 2 times daily 15 g 3     fludrocortisone (FLORINEF) 0.1 MG tablet Take 1 tablet (0.1 mg) by mouth daily 90 tablet 1     hydrocortisone (CORTEF) 10 MG tablet 2 tabs in am and 1 tab in afternoon/evening 270 tablet 3     SYMBICORT 160-4.5 MCG/ACT Inhaler Inhale 2 puffs into the lungs 2 times daily 3 Inhaler 1     Syringe/Needle, Disp, (SYRINGE LUER LOCK) 23G X 1\" 3 ML MISC 100 mg as needed 3 each 3     venlafaxine (EFFEXOR-XR) 150 MG 24 hr capsule Take 1 capsule (150 mg) by mouth daily 90 capsule 0     venlafaxine (EFFEXOR-XR) 75 MG 24 hr capsule Take 1 capsule (75 mg) by mouth daily 90 capsule 1     hydrocortisone sodium succinate PF (SOLU-CORTEF) 100 MG injection Inject 2 mLs (100 mg) into the muscle once for 1 dose Dispense as Act-O-Vial 2 mL 0     Allergies   Allergen Reactions     Ceclor [Cefaclor Monohydrate]      Unknown       Sulfa Drugs      Unknown       Recent Labs   Lab Test 12/02/19  0831 12/03/18  0824   CR 0.68 0.62*   GFRESTIMATED >90 >90   GFRESTBLACK >90 >90   POTASSIUM 3.6 3.6        Reviewed and updated as needed this visit by clinical staff  Tobacco  Allergies  Meds  Soc Hx        Reviewed and updated as needed this visit by Provider  Tobacco  Soc Hx       Past Medical History:   Diagnosis Date     Anxiety      " "Aortic atresia or stenosis, congenital 12/2001    Repair 2001 after had MI;     Asthma, moderate persistent, well-controlled      Congenital adrenal hyperplasia (H) 1989    Dx at 1 mo of age; mother with CAH also (?)      Past Surgical History:   Procedure Laterality Date     aortic stenosis s/p surgical repair including cor reconstruction 2001       OB History   No obstetric history on file.       ROS:  CONSTITUTIONAL: NEGATIVE for fever, chills, change in weight  INTEGUMENTARY/SKIN: NEGATIVE for worrisome rashes, moles or lesions  EYES: NEGATIVE for vision changes or irritation  ENT: NEGATIVE for ear, mouth and throat problems  RESP: NEGATIVE for significant cough or SOB  CV: NEGATIVE for chest pain, palpitations or peripheral edema  GI: NEGATIVE for nausea, abdominal pain, heartburn, or change in bowel habits   male: negative for dysuria, hematuria, decreased urinary stream, erectile dysfunction, urethral discharge.  POSITIVE for urinary frequency - see HPI   MUSCULOSKELETAL: NEGATIVE for significant arthralgias or myalgia  NEURO: NEGATIVE for weakness, dizziness or paresthesias  HEME/ALLERGY/IMMUNE: POSITIVE for nose bleeds - see HPI.  PSYCHIATRIC: NEGATIVE for changes in mood or affect - mood has been stable     OBJECTIVE:   /73 (BP Location: Right arm, Patient Position: Sitting, Cuff Size: Adult Regular)   Pulse 78   Temp 98  F (36.7  C) (Oral)   Resp 16   Ht 1.753 m (5' 9\")   Wt 64.5 kg (142 lb 4.8 oz)   SpO2 96%   BMI 21.01 kg/m    EXAM:  GENERAL: healthy, alert and no distress  EYES: Eyes grossly normal to inspection, PERRL and conjunctivae and sclerae normal  HENT: ear canals and TM's normal, nose and mouth without ulcers or lesions  NECK: no adenopathy, no asymmetry, masses, or scars and thyroid normal to palpation  RESP: lungs clear to auscultation - no rales, rhonchi or wheezes  CV: regular rate and rhythm, normal S1 S2, no S3 or S4, no murmur, click or rub, no peripheral edema and " peripheral pulses strong  ABDOMEN: soft, nontender, no hepatosplenomegaly, no masses and bowel sounds normal   (male): normal male genitalia without lesions or urethral discharge, no hernia  RECTAL: deferred due to age.  Referred to urology   MS: no gross musculoskeletal defects noted, no edema  SKIN: no suspicious lesions or rashes  NEURO: Normal strength and tone, mentation intact and speech normal  PSYCH: mentation appears normal, affect normal/bright  LYMPH: no cervical, supraclavicular, axillary, or inguinal adenopathy    Diagnostic Test Results:  Labs reviewed in [facility]  STI testing. UA/UC.  Fasting lab work - patient will return when fasting 8 hours for fasting lab work     ASSESSMENT/PLAN:   (Z00.00) Routine general medical examination at a health care facility  (primary encounter diagnosis)  Comment:   Plan: Lipid panel reflex to direct LDL Fasting,         Comprehensive metabolic panel, UA reflex to         Microscopic, CANCELED: Hemoglobin         Patient will return for fasting lab work when has fasted 8 hours     (R35.0) Urinary frequency  Comment:  UA/UC today.  Recent PSA ordered through endocrinology which was normal at 0.44   Plan: UROLOGY ADULT REFERRAL, *UA reflex to         Microscopic, Urine Culture          (Z11.3) Screen for STD (sexually transmitted disease)  Comment:  Plan: NEISSERIA GONORRHOEA PCR, CHLAMYDIA TRACHOMATIS        PCR, NEISSERIA GONORRHOEA PCR, CHLAMYDIA         TRACHOMATIS PCR, HIV Antigen Antibody Combo,         Treponema Abs w Reflex to RPR and Titer            (F41.9) Anxiety  Comment:  Plan: buPROPion (WELLBUTRIN SR) 100 MG 12 hr tablet         Continue current medication which includes wellbutrin and effexor. Patient states his mood has been stable.     (J45.40) Asthma, moderate persistent, well-controlled  Comment:   Plan: SYMBICORT 160-4.5 MCG/ACT Inhaler        Well-controlled asthma.  Continue current plan.      (R04.0) Bleeding from the nose  Comment:   Plan: CBC  "with platelets differential         Discussed humidified air.  Nasal saline.     Trial of an antibiotic ointment to nose if bleeding.      (Z13.220) Screening for hyperlipidemia  Comment:  Plan: Lipid panel reflex to direct LDL Fasting,         Comprehensive metabolic panel           (Z11.4) Screening for HIV (human immunodeficiency virus)  Comment:  Plan: HIV Antigen Antibody Combo, CBC with platelets         differential           Recheck asthma and anxiety in 6 months, sooner if needed.  Follow-up with urology.  Follow-up with endocrinology per their routine.  Follow-up with cardiology.     COUNSELING:  Reviewed preventive health counseling, as reflected in patient instructions       Regular exercise       Healthy diet/nutrition       Vision screening       Hearing screening       Safe sex practices/STD prevention       HIV screeninx in teen years, 1x in adult years, and at intervals if high risk    Estimated body mass index is 21.01 kg/m  as calculated from the following:    Height as of this encounter: 1.753 m (5' 9\").    Weight as of this encounter: 64.5 kg (142 lb 4.8 oz).    Tdap - 2014 - review [facility].        reports that he has quit smoking. His smoking use included cigarettes. He has never used smokeless tobacco.      Counseling Resources:  ATP IV Guidelines  Pooled Cohorts Equation Calculator  FRAX Risk Assessment  ICSI Preventive Guidelines  Dietary Guidelines for Americans, 2010  USDA's MyPlate  ASA Prophylaxis  Lung CA Screening      PREM Salgado, CNP  [facility]  "

## 2019-12-24 NOTE — TELEPHONE ENCOUNTER
hydrocortisone (CORTEF) 10 MG tablet      Last Written Prescription Date:  1/11/19  Last Fill Quantity: 270,   # refills: 3  Last Office Visit : 12/2/19  Future Office visit:  none    Refill to pharmacy.

## 2019-12-24 NOTE — TELEPHONE ENCOUNTER
MEDICAL RECORDS REQUEST   [facility]  Urology Clinic  [address] [place] [address]  PHONE: [phone]  Fax: [phone]        FUTURE VISIT INFORMATION                                                   Laci Dsouza, : 1990 scheduled for future visit at [facility]    APPOINTMENT INFORMATION:    Date: 20 5PM    Provider:  Kemi Gutierrez RN    Reason for Visit/Diagnosis: Frequent urination     REFERRAL INFORMATION:    Referring provider:  FLORIDALMA MORALES    Specialty: APRN CNP    Referring providers clinic:  [facility]     Clinic contact number:  [phone]    RECORDS REQUESTED FOR VISIT                                                     NOTES  STATUS/DETAILS   OFFICE NOTE from referring provider  yes   OFFICE NOTE from other specialist  no   DISCHARGE SUMMARY from hospital  no   DISCHARGE REPORT from the ER  no   OPERATIVE REPORT  no   MEDICATION LIST  no   LABS     URINALYSIS (UA)  yes     PRE-VISIT CHECKLIST      Record collection complete Yes   Appointment appropriately scheduled           (right time/right provider) Yes   MyChart activation Yes   Questionnaire complete If no, please explain: In process      Completed by: Ariadna Carney

## 2019-12-25 LAB
BACTERIA SPEC CULT: NO GROWTH
Lab: NORMAL
SPECIMEN SOURCE: NORMAL

## 2019-12-25 ASSESSMENT — ASTHMA QUESTIONNAIRES: ACT_TOTALSCORE: 24

## 2019-12-26 DIAGNOSIS — Z11.4 SCREENING FOR HIV (HUMAN IMMUNODEFICIENCY VIRUS): ICD-10-CM

## 2019-12-26 DIAGNOSIS — Z00.00 ROUTINE GENERAL MEDICAL EXAMINATION AT A HEALTH CARE FACILITY: ICD-10-CM

## 2019-12-26 DIAGNOSIS — Z13.220 SCREENING FOR HYPERLIPIDEMIA: ICD-10-CM

## 2019-12-26 DIAGNOSIS — Z11.3 SCREEN FOR STD (SEXUALLY TRANSMITTED DISEASE): ICD-10-CM

## 2019-12-26 DIAGNOSIS — R04.0 BLEEDING FROM THE NOSE: ICD-10-CM

## 2019-12-26 LAB
ALBUMIN SERPL-MCNC: 3.8 G/DL (ref 3.4–5)
ALP SERPL-CCNC: 64 U/L (ref 40–150)
ALT SERPL W P-5'-P-CCNC: 34 U/L (ref 0–70)
ANION GAP SERPL CALCULATED.3IONS-SCNC: 1 MMOL/L (ref 3–14)
AST SERPL W P-5'-P-CCNC: 22 U/L (ref 0–45)
BASOPHILS # BLD AUTO: 0.1 10E9/L (ref 0–0.2)
BASOPHILS NFR BLD AUTO: 0.9 %
BILIRUB SERPL-MCNC: 0.2 MG/DL (ref 0.2–1.3)
BUN SERPL-MCNC: 15 MG/DL (ref 7–30)
CALCIUM SERPL-MCNC: 8.5 MG/DL (ref 8.5–10.1)
CHLORIDE SERPL-SCNC: 101 MMOL/L (ref 94–109)
CO2 SERPL-SCNC: 31 MMOL/L (ref 20–32)
CREAT SERPL-MCNC: 0.71 MG/DL (ref 0.66–1.25)
DIFFERENTIAL METHOD BLD: NORMAL
EOSINOPHIL # BLD AUTO: 0.1 10E9/L (ref 0–0.7)
EOSINOPHIL NFR BLD AUTO: 1 %
ERYTHROCYTE [DISTWIDTH] IN BLOOD BY AUTOMATED COUNT: 13.2 % (ref 10–15)
GFR SERPL CREATININE-BSD FRML MDRD: >90 ML/MIN/{1.73_M2}
GLUCOSE SERPL-MCNC: 80 MG/DL (ref 70–99)
HCT VFR BLD AUTO: 42.2 % (ref 40–53)
HGB BLD-MCNC: 13.8 G/DL (ref 13.3–17.7)
HIV 1+2 AB+HIV1 P24 AG SERPL QL IA: NONREACTIVE
IMM GRANULOCYTES # BLD: 0 10E9/L (ref 0–0.4)
IMM GRANULOCYTES NFR BLD: 0.6 %
LYMPHOCYTES # BLD AUTO: 1.5 10E9/L (ref 0.8–5.3)
LYMPHOCYTES NFR BLD AUTO: 22.7 %
MCH RBC QN AUTO: 29.2 PG (ref 26.5–33)
MCHC RBC AUTO-ENTMCNC: 32.7 G/DL (ref 31.5–36.5)
MCV RBC AUTO: 89 FL (ref 78–100)
MONOCYTES # BLD AUTO: 0.5 10E9/L (ref 0–1.3)
MONOCYTES NFR BLD AUTO: 6.7 %
NEUTROPHILS # BLD AUTO: 4.6 10E9/L (ref 1.6–8.3)
NEUTROPHILS NFR BLD AUTO: 68.1 %
NRBC # BLD AUTO: 0 10*3/UL
NRBC BLD AUTO-RTO: 0 /100
PLATELET # BLD AUTO: 318 10E9/L (ref 150–450)
POTASSIUM SERPL-SCNC: 4.2 MMOL/L (ref 3.4–5.3)
PROT SERPL-MCNC: 6.8 G/DL (ref 6.8–8.8)
RBC # BLD AUTO: 4.72 10E12/L (ref 4.4–5.9)
SODIUM SERPL-SCNC: 133 MMOL/L (ref 133–144)
T PALLIDUM AB SER QL: NONREACTIVE
WBC # BLD AUTO: 6.7 10E9/L (ref 4–11)

## 2019-12-27 LAB
C TRACH DNA SPEC QL NAA+PROBE: NEGATIVE
C TRACH DNA SPEC QL NAA+PROBE: NEGATIVE
N GONORRHOEA DNA SPEC QL NAA+PROBE: NEGATIVE
N GONORRHOEA DNA SPEC QL NAA+PROBE: NEGATIVE
SPECIMEN SOURCE: NORMAL

## 2020-01-08 ENCOUNTER — PRE VISIT (OUTPATIENT)
Dept: UROLOGY | Facility: CLINIC | Age: 30
End: 2020-01-08

## 2020-01-08 PROBLEM — F41.1 GENERALIZED ANXIETY DISORDER: Status: ACTIVE | Noted: 2020-01-08

## 2020-01-08 PROBLEM — I35.0 AORTIC STENOSIS: Status: ACTIVE | Noted: 2020-01-08

## 2020-01-08 PROBLEM — E87.1 HYPONATREMIA: Status: ACTIVE | Noted: 2020-01-08

## 2020-01-08 PROBLEM — J45.909 ASTHMA: Status: ACTIVE | Noted: 2020-01-08

## 2020-01-08 PROBLEM — F32.9 MAJOR DEPRESSION: Status: ACTIVE | Noted: 2020-01-08

## 2020-01-08 PROBLEM — A51.31 CONDYLOMATA LATA OF PERIANAL SKIN: Status: ACTIVE | Noted: 2018-11-03

## 2020-01-08 NOTE — TELEPHONE ENCOUNTER
Reason for Visit: Consult    Diagnosis: Frequent urination    Orders/Procedures/Records: Records available    Contact Patient: N/A    Rooming Requirements: Normal      Isabella Banegas  01/08/20  1:44 PM

## 2020-01-21 NOTE — PROGRESS NOTES
Chief Complaint:   Frequent urination         History of Present Illness:    Laci Dsouza is a very pleasant 29 year old male with a history of congenital adrenal hyperplasia, aortic stenosis, asthma, and anxiety who presents for evaluation of the above. Per chart review, he was seen by his PCP on 12/24/19 and reported that this has been going on for the past few years. He had attempted a reduction in caffeine and ETOH, which helped, but he continued to have frequency. His endocrinologist had ordered a PSA, which was normal. At UA and STI testing completed, which were all normal.     Today, he states that his most bothersome symptom is urgency. He has not experienced any overt UUI, but does often come close to leakage while he is on his way to the bathroom. This has been ongoing for the past few years, but has seemed to worsen over the past few months while he has been under a significant amount of personal stress. Denies any dysuria, pyuria, hesitancy, intermittency, feelings of incomplete emptying, or any recent history of urinary tract infections or stones.         Past Medical History:     Past Medical History:   Diagnosis Date     Anxiety      Aortic atresia or stenosis, congenital 12/2001    Repair 2001 after had MI;     Asthma, moderate persistent, well-controlled      Congenital adrenal hyperplasia (H) 1989    Dx at 1 mo of age; mother with CAH also (?)            Past Surgical History:     Past Surgical History:   Procedure Laterality Date     aortic stenosis s/p surgical repair including cor reconstruction 2001              Medications     Current Outpatient Medications   Medication     albuterol (PROAIR HFA, PROVENTIL HFA, VENTOLIN HFA) 108 (90 BASE) MCG/ACT inhaler     albuterol (PROAIR RESPICLICK) 108 (90 Base) MCG/ACT inhaler     buPROPion (WELLBUTRIN SR) 100 MG 12 hr tablet     clotrimazole (LOTRIMIN) 1 % external cream     fludrocortisone (FLORINEF) 0.1 MG tablet     hydrocortisone  "(CORTEF) 10 MG tablet     SYMBICORT 160-4.5 MCG/ACT Inhaler     Syringe/Needle, Disp, (SYRINGE LUER LOCK) 23G X 1\" 3 ML MISC     venlafaxine (EFFEXOR-XR) 150 MG 24 hr capsule     venlafaxine (EFFEXOR-XR) 75 MG 24 hr capsule     hydrocortisone sodium succinate PF (SOLU-CORTEF) 100 MG injection     No current facility-administered medications for this visit.             Family History:     Family History   Adopted: Yes   Problem Relation Age of Onset     Bipolar Disorder Father             Social History:     Social History     Socioeconomic History     Marital status:      Spouse name: Not on file     Number of children: Not on file     Years of education: Not on file     Highest education level: Not on file   Occupational History     Employer: [employer]   Social Needs     Financial resource strain: Not on file     Food insecurity:     Worry: Not on file     Inability: Not on file     Transportation needs:     Medical: Not on file     Non-medical: Not on file   Tobacco Use     Smoking status: Former Smoker     Types: Cigarettes     Smokeless tobacco: Never Used   Substance and Sexual Activity     Alcohol use: Yes     Frequency: 2-4 times a month     Drinks per session: 3 or 4     Drug use: Never     Sexual activity: Yes     Partners: Male   Lifestyle     Physical activity:     Days per week: 5 days     Minutes per session: Not on file     Stress: Not on file   Relationships     Social connections:     Talks on phone: Not on file     Gets together: Not on file     Attends Bahai service: Not on file     Active member of club or organization: Not on file     Attends meetings of clubs or organizations: Not on file     Relationship status: Not on file     Intimate partner violence:     Fear of current or ex partner: Not on file     Emotionally abused: Not on file     Physically abused: Not on file     Forced sexual activity: Not on file   Other Topics Concern     Not on file " "  Social History Narrative    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             Allergies:   Ceclor [cefaclor monohydrate] and Sulfa drugs         Review of Systems:  From intake questionnaire   Negative 14 system review except as noted on HPI, nurse's note.         Physical Exam:   Patient is a 29 year old  male   Vitals: Blood pressure (!) 147/69, pulse 96, height 1.702 m (5' 7\"), weight 61.2 kg (135 lb).  General Appearance Adult: Alert, no acute distress, oriented  Lungs: no respiratory distress, or pursed lip breathing  Heart: No obvious jugular venous distension present  Abdomen: soft, nontender, no organomegaly or masses, Body mass index is 21.14 kg/m .  : deferred     Uroflow and Post-Void Residual by Bladder Scan   Voided Volume: 67 mL  QMax: 11.5 mL/s  QAv.7 mL/s  PVR: 70 mL      Labs and Pathology:    I personally reviewed all applicable laboratory data and went over findings with patient  Significant for:    CBC RESULTS:  Recent Labs   Lab Test 19  0727   WBC 6.7   HGB 13.8           BMP RESULTS:  Recent Labs   Lab Test 19  0727 19  0831 18  0824 17  1009  04/22/15  1057 13  1140    138 139 137   < > 137 138   POTASSIUM 4.2 3.6 3.6 4.0   < > 3.7 3.6   CHLORIDE 101 105 104  --   --   --  99   CO2 31 28 26  --   --   --  28   ANIONGAP 1* 4 8  --   --   --  10   GLC 80 75 60* 73   < > 76 71   BUN 15 15 11  --   --   --  16   CR 0.71 0.68 0.62*  --   --   --  0.69   GFRESTIMATED >90 >90 >90  --   --   --  >90   GFRESTBLACK >90 >90 >90  --   --   --  >90   HI 8.5 8.4* 9.0  --   --  9.1 9.4    < > = values in this interval not displayed.       UA RESULTS:   Recent Labs   Lab Test 19  0750   SG 1.020   URINEPH 5.0   NITRITE Negative   RBCU <1   WBCU <1       PSA RESULTS  PSA   Date Value Ref Range Status   2019 0.44 0 - 4 ug/L Final     Comment:     Assay Method:  Chemiluminescence using Siemens Vista analyzer         " Imaging:    I personally reviewed all applicable imaging and went over findings with patient.  Significant for:    Results for orders placed or performed in visit on 12/02/19   US Testicular & Scrotum w Doppler Ltd    Narrative    EXAMINATION: US TESTICULAR AND SCROTUM WITH DOPPLER LIMITED, 12/2/2019  8:53 AM     COMPARISON: Ultrasound 12/17/2018    HISTORY: Congenital adrenal hyperplasia    TECHNIQUE: The was scanned in standard fashion with specialized  ultrasound transducer(s) using grey scale, color Doppler, and spectral  flow  techniques.    Findings:  The testes demonstrate normal and symmetric echotexture and  vascularity. Irregular hypoechoic area within the right testicle  measuring 3.0 x 1.4 x 1.1 cm, this measures similar to the prior exam  and differences are likely related to technique. There are areas of  peripheral and possible internal vascularity. An irregular hypoechoic  area within the left testicle measures 1.2 x 1.1 x 2.8 cm with trace  areas of peripheral vascularity and possible areas of internal  vascularity. The overall size is similar to the prior exam and  differences may be related to technique. The right testicle measures  4.7 x 3.7 x 2.4 cm and the left measures 4.4 x 3.5 x 2.4 cm.     There is no evidence of a significant hydrocele.    Both the right and left epididymis are within normal limits.      Impression    Impression: Irregular hypoechoic areas within the testicles remain  similar to the prior exam with any difference in size likely related  to differences in technique. These remain suggestive of testicular  adrenal rests.    I have personally reviewed the examination and initial interpretation  and I agree with the findings.    AGUS DIXON MD            Assessment and Plan:     Assessment: 29 year old male with a history of congenital adrenal hyperplasia, aortic stenosis, asthma, and anxiety who presents for evaluation of urinary frequency and urgency for the past few  years. Denies associated symptoms to suggest infectious etiology. We discussed other potential etiologies of his symptoms, including bladder overactivity vs. pelvic floor dysfunction vs. other. Given the worsening of his symptoms in recent months, which has seemed to correlate with increased levels of stress, will pursue referral to PFPT with a plan to reevaluate.     Plan:  -Referral placed to pelvic floor PT.  -Follow up with me in 6-8 weeks after PT initiated to review symptoms and discuss next steps if symptoms not improved.       Kemi Gutierrez, CNP  Department of Urology

## 2020-01-22 ENCOUNTER — OFFICE VISIT (OUTPATIENT)
Dept: UROLOGY | Facility: CLINIC | Age: 30
End: 2020-01-22
Payer: COMMERCIAL

## 2020-01-22 ENCOUNTER — PRE VISIT (OUTPATIENT)
Dept: UROLOGY | Facility: CLINIC | Age: 30
End: 2020-01-22

## 2020-01-22 VITALS
HEIGHT: 67 IN | BODY MASS INDEX: 21.19 KG/M2 | SYSTOLIC BLOOD PRESSURE: 147 MMHG | HEART RATE: 96 BPM | DIASTOLIC BLOOD PRESSURE: 69 MMHG | WEIGHT: 135 LBS

## 2020-01-22 DIAGNOSIS — M62.89 PELVIC FLOOR DYSFUNCTION: Primary | ICD-10-CM

## 2020-01-22 DIAGNOSIS — R39.15 URINARY URGENCY: ICD-10-CM

## 2020-01-22 ASSESSMENT — ENCOUNTER SYMPTOMS
NERVOUS/ANXIOUS: 1
BRUISES/BLEEDS EASILY: 1
DECREASED CONCENTRATION: 1
INSOMNIA: 1
DEPRESSION: 1
PANIC: 0
DIFFICULTY URINATING: 1

## 2020-01-22 ASSESSMENT — MIFFLIN-ST. JEOR: SCORE: 1530.99

## 2020-01-22 ASSESSMENT — PATIENT HEALTH QUESTIONNAIRE - PHQ9
10. IF YOU CHECKED OFF ANY PROBLEMS, HOW DIFFICULT HAVE THESE PROBLEMS MADE IT FOR YOU TO DO YOUR WORK, TAKE CARE OF THINGS AT HOME, OR GET ALONG WITH OTHER PEOPLE: SOMEWHAT DIFFICULT
SUM OF ALL RESPONSES TO PHQ QUESTIONS 1-9: 4
SUM OF ALL RESPONSES TO PHQ QUESTIONS 1-9: 4

## 2020-01-22 ASSESSMENT — PAIN SCALES - GENERAL: PAINLEVEL: NO PAIN (0)

## 2020-01-22 NOTE — PATIENT INSTRUCTIONS
UROLOGY CLINIC VISIT PATIENT INSTRUCTIONS    1) I have placed a referral to pelvic floor physical therapy.  2) Follow up with me in 6-8 weeks after you have started therapy to discuss next steps if your symptoms have not resolved.    If you have any issues, questions or concerns in the meantime, do not hesitate to contact us at [phone] or via DLS.     It was a pleasure meeting with you today.  Thank you for allowing me and my team the privilege of caring for you today.  YOU are the reason we are here, and I truly hope we provided you with the excellent service you deserve.  Please let us know if there is anything else we can do for you so that we can be sure you are leaving completely satisfied with your care experience.    Kemi Gutierrez, CNP

## 2020-03-16 ENCOUNTER — THERAPY VISIT (OUTPATIENT)
Dept: PHYSICAL THERAPY | Facility: CLINIC | Age: 30
End: 2020-03-16
Attending: NURSE PRACTITIONER
Payer: COMMERCIAL

## 2020-03-16 DIAGNOSIS — R39.15 URINARY URGENCY: ICD-10-CM

## 2020-03-16 DIAGNOSIS — M62.89 PELVIC FLOOR DYSFUNCTION: ICD-10-CM

## 2020-03-16 PROCEDURE — 97161 PT EVAL LOW COMPLEX 20 MIN: CPT | Mod: GP | Performed by: PHYSICAL THERAPIST

## 2020-03-16 PROCEDURE — 97535 SELF CARE MNGMENT TRAINING: CPT | Mod: GP | Performed by: PHYSICAL THERAPIST

## 2020-03-16 PROCEDURE — 97112 NEUROMUSCULAR REEDUCATION: CPT | Mod: GP | Performed by: PHYSICAL THERAPIST

## 2020-03-16 NOTE — PROGRESS NOTES
[facility] Initial Evaluation  Subjective:  The history is provided by the patient. No  was used.   Patient Health History  Laci Dsouza being seen for frequent urination.     Date of Onset: 1/22/20, date of order.   Problem occurred: unknown   Pain is reported as 5/10 on pain scale.  General health as reported by patient is good.  Health conditions: asthma, heart problems, smoking (ex)   Red flags:  None as reported by patient.  Medical allergies: sulfa.   Surgeries include:  Heart surgery.    Current medications:  Anti-depressants and steroids.    Current occupation is Community , student.   Primary job tasks include:  Computer work and prolonged sitting.                  Therapist Assessment:   Clinical Impression: Pt [facility] to [facility] with primary complaint of frequent urination.  Per clinical examination, pt with poor coordination and increased tone of pelvic floor muscles.  Pt will benefit from skilled physical therapy for down regulation of nervous system, coordination training of pelvic floor muscles and strength vs stretching program.    Subjective: Patient presents today with frequent urination and a constant urge to urinate that never fully goes away. He notes his symptoms have slowly been getting worse over the past year. The pt states this he has not had any leaking with a strong urge, but has come very close. He notes that coffee and alcohol will make him need to go more frequently. The pt reports having horrible low back for the past 3 years. He describes his pain as lumbar and sacral pain that is central and dull/achy. He notes prolonged positions will increase his pain.     Testes/imaging: testicular US: negative    Current activity: lifting weights, and cardio,  1x a week, working out 4-5x a week. He is doing doing a yoga flow, cat/cow to help manage his back pain.     Goals:pee less frequently    Urination   Do  you leak on the way to the bathroom or with a strong urge to void? yes  Do you leak with cough, sneeze, jumping, running? no  Any other activities that cause leaking? n/a  Do you have triggers that make you feel you can't wait to go to the bathroom? no What are they? n/a  Type of pad and number used per day? 0  When you leak what is the amount? n/a  How long can you delay the need to urinate? I can't   How many times do you get up to urinate at night? 2-6x  How many times do you urinate during the day? Every 15-30 minutes  Can you stop the flow of urine when on the toilet? yes  Is the volume of urine passed usually: small  Do you strain to pass urine? yes  Do you have a slow or hesitant urinary stream? yes  Do you have difficulty initiating the urine stream? Slow to start, 5-10 seconds  How many bladder infections have you had in the last 12 months? 0  What is you fluid intake per day? Water (8oz) 8  Caffeine 2  Alcohol 0-2    Bowel Habits  Frequency of bowel movements? 1x a day  Consistency of stool? Hamlin stool scale? Type 3  Do you ignore the urge to defecate? no  Do you strain to pass stool? no    Pelvic Pain  Are you sexually active? yes  Do you have pain with erections, ejaculation, or sitting? no  Have you ever been worried for your physical safety? no  Any abdominal or pelvic surgeries? no  Are you having regular exercise? yes  Have you practiced the PF (kegel) exercise for 4 or more weeks? no    Objective:    POSTURE: forward head, rounded shoulders, posterior pelvic tilt    Observation: periodic chest breathing     FLEXIBILITY: decreased adductor and hip flexor flexibility    ABDOMINAL WALL: no tenderness to palpation of iliopsoas, iliacus, pubic symphysis      SEMG BIOFEEDBACK  Surface Electrode Placement:   Perianal: Levator ani     Baseline EMG PM: supine and sittinmV, standing: 3mV  Peak PFM contraction: supine: 8mV, sittinmV, standinmV.  Sluggish return to baseline after leena PFM.  Demonstrates co contraction of abdominal muscles and breath holding with PFM contraction in all positions.  Endurance: 10 seconds       Lumbar ROM: WNL    PALPATION: adductors B L>R      Assessment/Plan:    Patient is a 29 year old male with pelvic complaints.    Patient has the following significant findings with corresponding treatment plan.                Diagnosis 1:  Pelvic floor dysfunction  Pain -  hot/cold therapy, US, electric stimulation, mechanical traction, manual therapy, STS, splint/taping/bracing/orthotics, self management, education, directional preference exercise and home program  Decreased ROM/flexibility - manual therapy, therapeutic exercise, therapeutic activity and home program  Decreased strength - therapeutic exercise, therapeutic activities and home program  Impaired muscle performance - biofeedback, electric stimulation, neuro re-education and home program  Impaired posture - neuro re-education, therapeutic activities and home program    Therapy Evaluation Codes:   Cumulative Therapy Evaluation is: Low complexity.    Previous and current functional limitations:  (See Goal Flow Sheet for this information)    Short term and Long term goals: (See Goal Flow Sheet for this information)     Communication ability:  Patient appears to be able to clearly communicate and understand verbal and written communication and follow directions correctly.  Treatment Explanation - The following has been discussed with the patient:   RX ordered/plan of care  Anticipated outcomes  Possible risks and side effects  This patient would benefit from PT intervention to resume normal activities.   Rehab potential is good.    Frequency:  1 X week, once daily  Duration:  for 4 weeks tapering to 2 X a month over 2 months  Discharge Plan:  Achieve all LTG.  Independent in home treatment program.  Reach maximal therapeutic benefit.    Please refer to the daily flowsheet for treatment today, total treatment time and time  spent performing 1:1 timed codes.

## 2020-04-30 DIAGNOSIS — E25.0 CONGENITAL ADRENAL HYPERPLASIA DUE TO 21-HYDROXYLASE DEFICIENCY (21-OH CAH), SIMPLE VIRILIZING (H): ICD-10-CM

## 2020-04-30 RX ORDER — FLUDROCORTISONE ACETATE 0.1 MG/1
0.1 TABLET ORAL DAILY
Qty: 90 TABLET | Refills: 2 | Status: SHIPPED | OUTPATIENT
Start: 2020-04-30 | End: 2021-01-20

## 2020-05-04 DIAGNOSIS — F41.1 GAD (GENERALIZED ANXIETY DISORDER): ICD-10-CM

## 2020-05-05 NOTE — TELEPHONE ENCOUNTER
venlafaxine (EFFEXOR-XR) 150 MG 24 hr capsule       Last Written Prescription Date:  12/4/19  Last Fill Quantity: 90,   # refills: 0  Last Office Visit : 12/24/19  Future Office visit:  none    Routing refill request to provider for review/approval because:  BP > 140/90  01/22/20 (!) 147/69

## 2020-05-06 RX ORDER — VENLAFAXINE HYDROCHLORIDE 150 MG/1
150 CAPSULE, EXTENDED RELEASE ORAL DAILY
Qty: 90 CAPSULE | Refills: 0 | Status: SHIPPED | OUTPATIENT
Start: 2020-05-06 | End: 2020-06-22

## 2020-06-08 ENCOUNTER — MYC REFILL (OUTPATIENT)
Dept: FAMILY MEDICINE | Facility: CLINIC | Age: 30
End: 2020-06-08

## 2020-06-08 DIAGNOSIS — F41.1 GAD (GENERALIZED ANXIETY DISORDER): ICD-10-CM

## 2020-06-08 RX ORDER — VENLAFAXINE HYDROCHLORIDE 75 MG/1
75 CAPSULE, EXTENDED RELEASE ORAL DAILY
Qty: 90 CAPSULE | Refills: 1 | Status: SHIPPED | OUTPATIENT
Start: 2020-06-08 | End: 2020-06-17

## 2020-06-17 RX ORDER — VENLAFAXINE HYDROCHLORIDE 75 MG/1
75 CAPSULE, EXTENDED RELEASE ORAL DAILY
Qty: 90 CAPSULE | Refills: 1 | Status: SHIPPED | OUTPATIENT
Start: 2020-06-17 | End: 2020-06-22

## 2020-06-22 NOTE — TELEPHONE ENCOUNTER
KENNY-7 SCORE 12/20/2018 6/22/2020   Total Score - 12 (moderate anxiety)   Total Score 7 12       PHQ 12/20/2018 1/22/2020 6/22/2020   PHQ-9 Total Score 5 4 2   Q9: Thoughts of better off dead/self-harm past 2 weeks Not at all Not at all Not at all     Messaging patient regarding increase in KENNY 7 score and to clarify which meds need to be refilled. PREM Shields, CNP'

## 2020-07-01 PROBLEM — R39.15 URINARY URGENCY: Status: RESOLVED | Noted: 2020-03-16 | Resolved: 2020-07-01

## 2020-08-19 DIAGNOSIS — J45.40 ASTHMA, MODERATE PERSISTENT, WELL-CONTROLLED: ICD-10-CM

## 2020-08-19 RX ORDER — BUDESONIDE AND FORMOTEROL FUMARATE DIHYDRATE 160; 4.5 UG/1; UG/1
2 AEROSOL RESPIRATORY (INHALATION) 2 TIMES DAILY
Qty: 3 INHALER | Refills: 1 | Status: SHIPPED | OUTPATIENT
Start: 2020-08-19 | End: 2020-12-28

## 2020-08-19 NOTE — TELEPHONE ENCOUNTER
Last Clinic Visit: 12-24-19  Per clinic note:  (J45.40) Asthma, moderate persistent, well-controlled  Comment:   Plan: SYMBICORT 160-4.5 MCG/ACT Inhaler        Well-controlled asthma.  Continue current plan.

## 2020-08-19 NOTE — TELEPHONE ENCOUNTER
[facility] Call Center    Phone Message    May a detailed message be left on voicemail: yes     Reason for Call: Medication Refill Request    Has the patient contacted the pharmacy for the refill? Yes   Name of medication being requested: Symbicort  Provider who prescribed the medication: Geneva  Pharmacy: [facility] #[id number] - [place] [place] [facility] [address] [facility] [facility]   Date medication is needed: ASAP         Action Taken: Message routed to:  [facility] NP    Travel Screening: Not Applicable

## 2020-09-11 DIAGNOSIS — J45.40 ASTHMA, MODERATE PERSISTENT, WELL-CONTROLLED: ICD-10-CM

## 2020-09-11 RX ORDER — ALBUTEROL SULFATE 90 UG/1
2 POWDER, METERED RESPIRATORY (INHALATION) EVERY 4 HOURS PRN
Qty: 1 INHALER | Refills: 0 | Status: SHIPPED | OUTPATIENT
Start: 2020-09-11 | End: 2022-06-15

## 2020-09-11 NOTE — TELEPHONE ENCOUNTER
[facility] Call Center    Phone Message    May a detailed message be left on voicemail: yes     Reason for Call: Medication Refill Request    Has the patient contacted the pharmacy for the refill? Yes   Name of medication being requested: albuterol (PROAIR RESPICLICK) 108 (90 Base) MCG/ACT inhaler  Provider who prescribed the medication: juventino wood  Pharmacy:  [facility]/PHARMACY #[id number] - [place], [place] - [address]  Date medication is needed: asap         Action Taken: Message routed to:  [facility] ([facility]): zoltan    Travel Screening: Not Applicable

## 2020-09-11 NOTE — TELEPHONE ENCOUNTER
" Centralized Medication Refill Team note:    albuterol (PROAIR RESPICLICK) 108 (90 Base) MCG/ACT inhaler      Inhale 2 puffs into the lungs every 4 hours as needed for shortness of breath / dyspnea or wheezing - Inhalation   Last Written Prescription Date:  11/1/7/2019  Last Fill Quantity: 1 inhaler ,   # refills: none  Last Office Visit : 12/2/4/2019  Future Office visit:  None    Sissy refill sent to the pharmacy ACT =24 on 12/24/2019   Per 12/24/2019 note Asthma has been under good control. ACT score is 24.  Using his symbicort with rare use of albuterol inhaler. He uses albuterol before exercise.  \"          "

## 2020-11-18 ASSESSMENT — ANXIETY QUESTIONNAIRES
7. FEELING AFRAID AS IF SOMETHING AWFUL MIGHT HAPPEN: NOT AT ALL
1. FEELING NERVOUS, ANXIOUS, OR ON EDGE: NEARLY EVERY DAY
2. NOT BEING ABLE TO STOP OR CONTROL WORRYING: NEARLY EVERY DAY
6. BECOMING EASILY ANNOYED OR IRRITABLE: MORE THAN HALF THE DAYS
GAD7 TOTAL SCORE: 15
5. BEING SO RESTLESS THAT IT IS HARD TO SIT STILL: SEVERAL DAYS
4. TROUBLE RELAXING: NEARLY EVERY DAY
GAD7 TOTAL SCORE: 15
7. FEELING AFRAID AS IF SOMETHING AWFUL MIGHT HAPPEN: NOT AT ALL
3. WORRYING TOO MUCH ABOUT DIFFERENT THINGS: NEARLY EVERY DAY

## 2020-11-19 ENCOUNTER — OFFICE VISIT (OUTPATIENT)
Dept: FAMILY MEDICINE | Facility: CLINIC | Age: 30
End: 2020-11-19
Payer: COMMERCIAL

## 2020-11-19 VITALS
TEMPERATURE: 98.5 F | SYSTOLIC BLOOD PRESSURE: 122 MMHG | OXYGEN SATURATION: 97 % | DIASTOLIC BLOOD PRESSURE: 83 MMHG | HEART RATE: 93 BPM

## 2020-11-19 DIAGNOSIS — Z11.1 SCREENING EXAMINATION FOR PULMONARY TUBERCULOSIS: Primary | ICD-10-CM

## 2020-11-19 DIAGNOSIS — F41.1 GAD (GENERALIZED ANXIETY DISORDER): ICD-10-CM

## 2020-11-19 DIAGNOSIS — E25.0 CONGENITAL ADRENAL HYPERPLASIA (H): ICD-10-CM

## 2020-11-19 DIAGNOSIS — Z11.1 SCREENING EXAMINATION FOR PULMONARY TUBERCULOSIS: ICD-10-CM

## 2020-11-19 DIAGNOSIS — I35.0 AORTIC VALVE STENOSIS, ETIOLOGY OF CARDIAC VALVE DISEASE UNSPECIFIED: ICD-10-CM

## 2020-11-19 DIAGNOSIS — J45.40 ASTHMA, MODERATE PERSISTENT, WELL-CONTROLLED: ICD-10-CM

## 2020-11-19 PROCEDURE — 83002 ASSAY OF GONADOTROPIN (LH): CPT | Mod: 90 | Performed by: PATHOLOGY

## 2020-11-19 PROCEDURE — 84403 ASSAY OF TOTAL TESTOSTERONE: CPT | Mod: 90 | Performed by: PATHOLOGY

## 2020-11-19 PROCEDURE — 83498 ASY HYDROXYPROGESTERONE 17-D: CPT | Mod: 90 | Performed by: PATHOLOGY

## 2020-11-19 PROCEDURE — 99213 OFFICE O/P EST LOW 20 MIN: CPT | Performed by: NURSE PRACTITIONER

## 2020-11-19 PROCEDURE — 82157 ASSAY OF ANDROSTENEDIONE: CPT | Mod: 90 | Performed by: PATHOLOGY

## 2020-11-19 PROCEDURE — 86481 TB AG RESPONSE T-CELL SUSP: CPT | Mod: 90 | Performed by: PATHOLOGY

## 2020-11-19 PROCEDURE — 84270 ASSAY OF SEX HORMONE GLOBUL: CPT | Mod: 90 | Performed by: PATHOLOGY

## 2020-11-19 PROCEDURE — 80048 BASIC METABOLIC PNL TOTAL CA: CPT | Performed by: PATHOLOGY

## 2020-11-19 PROCEDURE — 36415 COLL VENOUS BLD VENIPUNCTURE: CPT | Performed by: PATHOLOGY

## 2020-11-19 PROCEDURE — 99000 SPECIMEN HANDLING OFFICE-LAB: CPT | Performed by: PATHOLOGY

## 2020-11-19 ASSESSMENT — PAIN SCALES - GENERAL: PAINLEVEL: NO PAIN (0)

## 2020-11-19 ASSESSMENT — PATIENT HEALTH QUESTIONNAIRE - PHQ9: SUM OF ALL RESPONSES TO PHQ QUESTIONS 1-9: 5

## 2020-11-19 ASSESSMENT — ANXIETY QUESTIONNAIRES: GAD7 TOTAL SCORE: 15

## 2020-11-19 NOTE — NURSING NOTE
Chief Complaint   Patient presents with     Asthma     Follow up on asthma     Ping Villar, RMA 3:09 PM  11/19/2020

## 2020-11-19 NOTE — PROGRESS NOTES
Subjective     Laci Dsouza is a 30 year old male who presents to clinic today for the following health issues:    HPI   Patient is here for a medication check.  He states his asthma is under good control.  He has been taking his Symbicort, no use of albuterol outside of exercise.  States only using with exercise.  Has been rinsing mouth out after using. 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   Patient is also followed by endocrinology. His mood is under good control.  He is on effexor 225mg.  No need for refills today. No suicidal thoughts or ideations.  Declines need for STI testing.  Needs TB screening today.   He declines any other questions or concerns today.       Review of Systems   Constitutional, HEENT, cardiovascular, pulmonary, GI, , musculoskeletal, neuro, skin, endocrine and psych systems are negative, except as otherwise noted.      Objective    /83   Pulse 93   Temp 98.5  F (36.9  C)   SpO2 97%   There is no height or weight on file to calculate BMI.  Physical Exam   GENERAL: healthy, alert and no distress  RESP: lungs clear to auscultation - no rales, rhonchi or wheezes  CV: regular rate and rhythm, normal S1 S2, no S3 or S4, no murmur, click or rub, no peripheral edema and peripheral pulses strong  PSYCH: mentation appears normal, affect normal/bright    Results for orders placed or performed in visit on 11/19/20   17 OH progesterone     Status: None   Result Value Ref Range    17-OH Progesterone 1,469 ng/dL   Basic metabolic panel     Status: Abnormal   Result Value Ref Range    Sodium 137 133 - 144 mmol/L    Potassium 3.7 3.4 - 5.3 mmol/L    Chloride 104 94 - 109 mmol/L    Carbon Dioxide 27 20 - 32 mmol/L    Anion Gap 6 3 - 14 mmol/L    Glucose 54 (L) 70 - 99 mg/dL    Urea Nitrogen 14 7 - 30 mg/dL    Creatinine 0.76 0.66 - 1.25  mg/dL    GFR Estimate >90 >60 mL/min/[1.73_m2]    GFR Estimate If Black >90 >60 mL/min/[1.73_m2]    Calcium 9.2 8.5 - 10.1 mg/dL   Lutropin     Status: None   Result Value Ref Range    Lutropin 2.0 1.5 - 9.3 IU/L   Testosterone Free and Total     Status: None   Result Value Ref Range    Testosterone Total 334 240 - 950 ng/dL    Sex Hormone Binding Globulin 44 11 - 80 nmol/L    Free Testosterone Calculated 5.52 4.7 - 24.4 ng/dL   Androstenedione     Status: Abnormal   Result Value Ref Range    Androstenedione 2.034 (H) 0.330 - 1.340 ng/mL   Quantiferon TB Gold Plus     Status: None    Specimen: Blood   Result Value Ref Range    MTB Quantiferon Result Negative NEG^Negative    TB1 Ag minus Nil 0.00 IU/mL    TB2 Ag minus Nil 0.01 IU/mL    Mitogen minus Nil 9.95 IU/mL    NIL Result 0.05 IU/mL           Assessment & Plan     Laci was seen today for asthma.    Diagnoses and all orders for this visit:    Screening examination for pulmonary tuberculosis  -     Quantiferon TB Gold Plus; Future    Asthma, moderate persistent, well-controlled    KENNY (generalized anxiety disorder)    Aortic valve stenosis, etiology of cardiac valve disease unspecified            See Patient Instructions  Follow-up with cardiology and endocrinology per their routine  Return to clinic if no improvement or symptoms worsen.  Patient verbalized understanding & agreed with plan of care.    PREM Marcus, CNP  [facility] [place]      Answers for HPI/ROS submitted by the patient on 11/18/2020   KENNY 7 TOTAL SCORE: 15

## 2020-11-19 NOTE — PATIENT INSTRUCTIONS

## 2020-11-20 LAB
ANION GAP SERPL CALCULATED.3IONS-SCNC: 6 MMOL/L (ref 3–14)
BUN SERPL-MCNC: 14 MG/DL (ref 7–30)
CALCIUM SERPL-MCNC: 9.2 MG/DL (ref 8.5–10.1)
CHLORIDE SERPL-SCNC: 104 MMOL/L (ref 94–109)
CO2 SERPL-SCNC: 27 MMOL/L (ref 20–32)
CREAT SERPL-MCNC: 0.76 MG/DL (ref 0.66–1.25)
GFR SERPL CREATININE-BSD FRML MDRD: >90 ML/MIN/{1.73_M2}
GLUCOSE SERPL-MCNC: 54 MG/DL (ref 70–99)
LH SERPL-ACNC: 2 IU/L (ref 1.5–9.3)
POTASSIUM SERPL-SCNC: 3.7 MMOL/L (ref 3.4–5.3)
SODIUM SERPL-SCNC: 137 MMOL/L (ref 133–144)

## 2020-11-22 LAB
GAMMA INTERFERON BACKGROUND BLD IA-ACNC: 0.05 IU/ML
M TB IFN-G CD4+ BCKGRND COR BLD-ACNC: 9.95 IU/ML
M TB TUBERC IFN-G BLD QL: NEGATIVE
MITOGEN IGNF BCKGRD COR BLD-ACNC: 0 IU/ML
MITOGEN IGNF BCKGRD COR BLD-ACNC: 0.01 IU/ML

## 2020-11-23 LAB — 17OHP SERPL-MCNC: 1469 NG/DL

## 2020-11-24 LAB
SHBG SERPL-SCNC: 44 NMOL/L (ref 11–80)
TESTOST FREE SERPL-MCNC: 5.52 NG/DL (ref 4.7–24.4)
TESTOST SERPL-MCNC: 334 NG/DL (ref 240–950)

## 2020-11-24 RX ORDER — ARIPIPRAZOLE 2 MG/1
2 TABLET ORAL DAILY
COMMUNITY
Start: 2020-11-11 | End: 2021-11-09

## 2020-11-24 ASSESSMENT — PATIENT HEALTH QUESTIONNAIRE - PHQ9: SUM OF ALL RESPONSES TO PHQ QUESTIONS 1-9: 3

## 2020-11-24 NOTE — PROGRESS NOTES
"Outcome for 11/24/20 9:13 AM :Left Voicemail for patient to call back-sofuentes    Laci Dsouza is a 30 year old male who is being evaluated via a billable video visit.      The patient has been notified of following:     \"This video visit will be conducted via a call between you and your physician/provider. We have found that certain health care needs can be provided without the need for an in-person physical exam.  This service lets us provide the care you need with a video conversation.  If a prescription is necessary we can send it directly to your pharmacy.  If lab work is needed we can place an order for that and you can then stop by our lab to have the test done at a later time.    Video visits are billed at different rates depending on your insurance coverage.  Please reach out to your insurance provider with any questions.    If during the course of the call the physician/provider feels a video visit is not appropriate, you will not be charged for this service.\"    Patient has given verbal consent for Video visit? Yes  How would you like to obtain your AVS? MyChart  If you are dropped from the video visit, the video invite should be resent to: Text to cell phone: [phone]  Will anyone else be joining your video visit? No            "

## 2020-11-25 LAB — ANDROST SERPL-MCNC: 2.03 NG/ML (ref 0.33–1.34)

## 2020-11-30 ENCOUNTER — VIRTUAL VISIT (OUTPATIENT)
Dept: ENDOCRINOLOGY | Facility: CLINIC | Age: 30
End: 2020-11-30
Payer: COMMERCIAL

## 2020-11-30 DIAGNOSIS — D35.00 ADRENAL REST TUMOR: ICD-10-CM

## 2020-11-30 DIAGNOSIS — E16.2 HYPOGLYCEMIA: ICD-10-CM

## 2020-11-30 DIAGNOSIS — E25.0 CONGENITAL ADRENAL HYPERPLASIA (H): Primary | ICD-10-CM

## 2020-11-30 PROCEDURE — 99214 OFFICE O/P EST MOD 30 MIN: CPT | Mod: 95 | Performed by: INTERNAL MEDICINE

## 2020-11-30 NOTE — PROGRESS NOTES
Video-Visit Details    Type of service:  Video Visit    Video call duration:  Start: 11/30/2020 07:37 am   Stop: 11/30/2020 07:56 am    Originating Location (pt. Location): Home  Distant Location (provider location):  [facility]  Platform used for Video Visit: Bob    Due to the COVID 19 pandemic this visit was converted to a video visit in order to help prevent spread of infection in this patient and the general population.     Laci Dsouza is a 30 year old male with congenital adrenal hyperplasia, on hydrocortisone and Florinef therapy.  He was previously seen by Dr. Cordova and he established care with me in 2018. The patient was diagnosed at 1 month of age and has been on lifelong hormone replacement.  Current dose of hydrocortisone is 20 mg in the morning, when he wakes up, and 10 mg around 2 PM.  He takes the Florinef dose in the morning, 0.1 mg daily.     On lab work from November 2020, androstenedione was elevated at 2.034 mg/mL, testosterone was 334 mg/dL, with a normal free testosterone of 5.52 ng/dL. LH and the electrolytes were normal.  The glucose level was low at 54, at 4 pm.  On questioning, the patient denies experiencing symptoms of hypoglycemia (tremor, sweating, palpitations, hunger) at the time he had the lab work done.  The testicular US from 12/2018 revealed irregular similar-appearing hypoechoic foci in the left and right testes, measuring 1.1 and 2.7 cm, suggestive of testicular adrenal rests. They remained stable on the f/up testicular US from 12/2019.     He was evaluated in the urology clinic in January 2020, for urinary urgency. Pelvic floor physical therapy was recommended and the patient reports some improvement, but he struggles having the exercises done regularly.  At his last visit, I prescribed topical clotrimazole for tinea pedis.  The fungal skin infection has resolved but he does have what appears to be onychomycosis in one of the toenails.    Past Medical History  "  Past Medical History:   Diagnosis Date     Anxiety      Aortic atresia or stenosis, congenital 12/2001    Repair 2001 after had MI;     Asthma, moderate persistent, well-controlled      Congenital adrenal hyperplasia (H) 1989    Dx at 1 mo of age; mother with CAH also (?)   Depression   No fractures  Asthma    Past Surgical History   Past Surgical History:   Procedure Laterality Date     aortic stenosis s/p surgical repair including cor reconstruction 2001         Current Medications    Current Outpatient Medications:      albuterol (PROAIR HFA, PROVENTIL HFA, VENTOLIN HFA) 108 (90 BASE) MCG/ACT inhaler, Inhale 2 puffs into the lungs every 6 hours as needed for shortness of breath / dyspnea or wheezing, Disp: 1 Inhaler, Rfl: 1     albuterol (PROAIR RESPICLICK) 108 (90 Base) MCG/ACT inhaler, Inhale 2 puffs into the lungs every 4 hours as needed for shortness of breath / dyspnea or wheezing, Disp: 1 Inhaler, Rfl: 0     ARIPiprazole (ABILIFY) 2 MG tablet, Take 2 mg by mouth daily, Disp: , Rfl:      fludrocortisone (FLORINEF) 0.1 MG tablet, Take 1 tablet (0.1 mg) by mouth daily, Disp: 90 tablet, Rfl: 2     hydrocortisone (CORTEF) 10 MG tablet, TAKE 2 TABS BY MOUTH IN IN THE MORNING AND 1 TAB IN AFTERNOON/EVENING, Disp: 270 tablet, Rfl: 3     SYMBICORT 160-4.5 MCG/ACT Inhaler, Inhale 2 puffs into the lungs 2 times daily, Disp: 3 Inhaler, Rfl: 1     venlafaxine (EFFEXOR-XR) 150 MG 24 hr capsule, Take 1 capsule (150 mg) by mouth daily Total daily dose 225 mg., Disp: 90 capsule, Rfl: 1     venlafaxine (EFFEXOR-XR) 75 MG 24 hr capsule, Take 1 capsule (75 mg) by mouth daily Total daily dose 225 mg., Disp: 90 capsule, Rfl: 1     hydrocortisone sodium succinate PF (SOLU-CORTEF) 100 MG injection, Inject 2 mLs (100 mg) into the muscle once for 1 dose Dispense as Act-O-Vial, Disp: 2 mL, Rfl: 0     Syringe/Needle, Disp, (SYRINGE LUER LOCK) 23G X 1\" 3 ML MISC, 100 mg as needed, Disp: 3 each, Rfl: 3  Takes a MVI for men     Family " History   Mother - congenital adrenal hyperplasia. Father - CVA in his 60s.     Social History  Single. No children. He might be interested in adopting, in the future.  Former smoker, 1/2 PPD for 10 years. Drinks 1-4 alcoholic drinks a week.  Denies using illicit drugs. 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     Review of Systems   Systemic:             No significant fatigue; poor sleep habits - with diff staying asleep; appetite has been great and the weight is stable  Eye:                      No eye symptoms   Lizeth-Laryngeal:     No lizeth-laryngeal symptoms, no dysphagia, no hoarseness, no cough     Breast:                  No breast symptoms  Cardiovascular:    No cardiovascular symptoms, no CP or palpitations   Pulmonary:           No pulmonary symptoms, no SOB or cough    Gastrointestinal:   No gastrointestinal symptoms, no diarrhea or constipation   Genitourinary:       increased urination - for many years; urinates ~1 times a night  Endocrine:            No endocrine symptoms, no cold or heat intolerance   Neurological:        No neurological symptoms, no headaches, no tremor, no numbness or tingling sensation, no dizziness   Musculoskeletal:  Lower back pain present intermittently   Skin:                     no dry skin, no hair falling out; no changes of the facial hair  Psychological:      Depression - longstanding and controlled                 Vital Signs     Previous Weights:    Wt Readings from Last 10 Encounters:   01/22/20 61.2 kg (135 lb)   12/24/19 64.5 kg (142 lb 4.8 oz)   12/02/19 62.5 kg (137 lb 12.8 oz)   12/20/18 70.2 kg (154 lb 12.8 oz)   12/03/18 68.9 kg (152 lb)   12/13/17 71.4 kg (157 lb 4.8 oz)   07/06/16 64.4 kg (142 lb)   04/22/15 61.9 kg (136 lb 6.4 oz)   11/08/13 61 kg (134 lb 8 oz)   12/12/12 60.8 kg (134 lb)        There were no vitals taken for this visit.    Physical Exam  General Appearance: alert, no distress noted   Eyes: grossly normal to inspection,  conjunctivae and sclerae normal, no lid lag or stare   Respiratory: no audible wheeze, cough, or visible cyanosis.  No visible retractions or increased work of breathing.  Able to speak fully in complete sentences.  Neurological: Cranial nerves grossly intact, mentation intact and speech normal; no tremor of the outstretched hands   Skin: no lesions on exposed skin   Psychological: mentation appears normal, affect normal, judgement and insight intact, normal speech and appearance well-groomed    Lab Results  I reviewed prior lab results documented in Epic.  TSH   Date Value Ref Range Status   08/07/2008 0.34 mcU/mL Final      Ref. Range 12/2/2019 08:31 12/26/2019 07:27 11/19/2020 16:10   17-OH Progesterone Latest Units: ng/dL 1,115  1,469   Androstenedione Latest Ref Range: 0.330 - 1.340 ng/mL 1.580 (H)  2.034 (H)   Testosterone Total Latest Ref Range: 240 - 950 ng/dL 413  334   Renin Activity Latest Units: ng/mL/hr 0.9     Glucose Latest Ref Range: 70 - 99 mg/dL 75 80 54 (L)   Sodium Latest Ref Range: 133 - 144 mmol/L 138 133 137   Potassium Latest Ref Range: 3.4 - 5.3 mmol/L 3.6 4.2 3.7   Lutropin Latest Ref Range: 1.5 - 9.3 IU/L 5.8  2.0   Free Testosterone Calculated Latest Ref Range: 4.7 - 24.4 ng/dL 6.90  5.52   Sex Hormone Binding Globulin Latest Ref Range: 11 - 80 nmol/L 45  44     Assessment:    1.  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  It is interesting that the patient has a family history of congenital adrenal hyperplasia in his mother.   Clinically, he is asymptomatic. The androstenedione to testosterone ratio on the most recent labwork was 0.6, with the caveat that the testosterone level was checked in the afternoon.    Recommendations:  Continue current dose of hydrocortisone and Florinef  Schedule an ultrasound of the testes in 1 year  Follow-up lab work in 1 year.  I reminded the patient to try to have the labs done in the morning, fasting.    2.   Onychomycosis  Topical antifungals are generally not effective in treating this condition.  Reassurance provided.    3.  Asymptomatic hypoglycemia  The patient denies experiencing symptoms of hypoglycemia.  No further evaluation required.    Orders Placed This Encounter   Procedures     US Testicular and Scrotum     Lutropin     Follicle stimulating hormone     Testosterone Free and Total     Androstenedione     Comprehensive metabolic panel     Renin activity

## 2020-11-30 NOTE — LETTER
"11/30/2020       RE: Laci Dsouza  [address]  [place] [address]     Dear Colleague,    Thank you for referring your patient, Laci Dsouza, to the [facility] ENDOCR[facility] [place] at [facility]. Please see a copy of my visit note below.    Outcome for 11/24/20 9:13 AM :Left Voicemail for patient to call back-soj    Laci Dsouza is a 30 year old male who is being evaluated via a billable video visit.      The patient has been notified of following:     \"This video visit will be conducted via a call between you and your physician/provider. We have found that certain health care needs can be provided without the need for an in-person physical exam.  This service lets us provide the care you need with a video conversation.  If a prescription is necessary we can send it directly to your pharmacy.  If lab work is needed we can place an order for that and you can then stop by our lab to have the test done at a later time.    Video visits are billed at different rates depending on your insurance coverage.  Please reach out to your insurance provider with any questions.    If during the course of the call the physician/provider feels a video visit is not appropriate, you will not be charged for this service.\"    Patient has given verbal consent for Video visit? Yes  How would you like to obtain your AVS? MyChart  If you are dropped from the video visit, the video invite should be resent to: Text to cell phone: [phone]  Will anyone else be joining your video visit? No              Video-Visit Details    Type of service:  Video Visit    Video call duration:  Start: 11/30/2020 07:37 am   Stop: 11/30/2020 07:56 am    Originating Location (pt. Location): Home  Distant Location (provider location):  [facility]  Platform used for Video Visit: AmWell    Due to the COVID 19 pandemic this visit was converted to a video visit in order to help prevent " spread of infection in this patient and the general population.     Laci Dsouza is a 30 year old male with congenital adrenal hyperplasia, on hydrocortisone and Florinef therapy.  He was previously seen by Dr. Cordova and he established care with me in 2018. The patient was diagnosed at 1 month of age and has been on lifelong hormone replacement.  Current dose of hydrocortisone is 20 mg in the morning, when he wakes up, and 10 mg around 2 PM.  He takes the Florinef dose in the morning, 0.1 mg daily.     On lab work from November 2020, androstenedione was elevated at 2.034 mg/mL, testosterone was 334 mg/dL, with a normal free testosterone of 5.52 ng/dL. LH and the electrolytes were normal.  The glucose level was low at 54, at 4 pm.  On questioning, the patient denies experiencing symptoms of hypoglycemia (tremor, sweating, palpitations, hunger) at the time he had the lab work done.  The testicular US from 12/2018 revealed irregular similar-appearing hypoechoic foci in the left and right testes, measuring 1.1 and 2.7 cm, suggestive of testicular adrenal rests. They remained stable on the f/up testicular US from 12/2019.     He was evaluated in the urology clinic in January 2020, for urinary urgency. Pelvic floor physical therapy was recommended and the patient reports some improvement, but he struggles having the exercises done regularly.  At his last visit, I prescribed topical clotrimazole for tinea pedis.  The fungal skin infection has resolved but he does have what appears to be onychomycosis in one of the toenails.    Past Medical History   Past Medical History:   Diagnosis Date     Anxiety      Aortic atresia or stenosis, congenital 12/2001    Repair 2001 after had MI;     Asthma, moderate persistent, well-controlled      Congenital adrenal hyperplasia (H) 1989    Dx at 1 mo of age; mother with CAH also (?)   Depression   No fractures  Asthma    Past Surgical History   Past Surgical History:   Procedure  "Laterality Date     aortic stenosis s/p surgical repair including cor reconstruction 2001         Current Medications    Current Outpatient Medications:      albuterol (PROAIR HFA, PROVENTIL HFA, VENTOLIN HFA) 108 (90 BASE) MCG/ACT inhaler, Inhale 2 puffs into the lungs every 6 hours as needed for shortness of breath / dyspnea or wheezing, Disp: 1 Inhaler, Rfl: 1     albuterol (PROAIR RESPICLICK) 108 (90 Base) MCG/ACT inhaler, Inhale 2 puffs into the lungs every 4 hours as needed for shortness of breath / dyspnea or wheezing, Disp: 1 Inhaler, Rfl: 0     ARIPiprazole (ABILIFY) 2 MG tablet, Take 2 mg by mouth daily, Disp: , Rfl:      fludrocortisone (FLORINEF) 0.1 MG tablet, Take 1 tablet (0.1 mg) by mouth daily, Disp: 90 tablet, Rfl: 2     hydrocortisone (CORTEF) 10 MG tablet, TAKE 2 TABS BY MOUTH IN IN THE MORNING AND 1 TAB IN AFTERNOON/EVENING, Disp: 270 tablet, Rfl: 3     SYMBICORT 160-4.5 MCG/ACT Inhaler, Inhale 2 puffs into the lungs 2 times daily, Disp: 3 Inhaler, Rfl: 1     venlafaxine (EFFEXOR-XR) 150 MG 24 hr capsule, Take 1 capsule (150 mg) by mouth daily Total daily dose 225 mg., Disp: 90 capsule, Rfl: 1     venlafaxine (EFFEXOR-XR) 75 MG 24 hr capsule, Take 1 capsule (75 mg) by mouth daily Total daily dose 225 mg., Disp: 90 capsule, Rfl: 1     hydrocortisone sodium succinate PF (SOLU-CORTEF) 100 MG injection, Inject 2 mLs (100 mg) into the muscle once for 1 dose Dispense as Act-O-Vial, Disp: 2 mL, Rfl: 0     Syringe/Needle, Disp, (SYRINGE LUER LOCK) 23G X 1\" 3 ML MISC, 100 mg as needed, Disp: 3 each, Rfl: 3  Takes a MVI for men     Family History   Mother - congenital adrenal hyperplasia. Father - CVA in his 60s.     Social History  Single. No children. He might be interested in adopting, in the future.  Former smoker, 1/2 PPD for 10 years. Drinks 1-4 alcoholic drinks a week.  Denies using illicit drugs. 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     Review of Systems   Systemic:            "  No significant fatigue; poor sleep habits - with diff staying asleep; appetite has been great and the weight is stable  Eye:                      No eye symptoms   Lizeth-Laryngeal:     No lizeth-laryngeal symptoms, no dysphagia, no hoarseness, no cough     Breast:                  No breast symptoms  Cardiovascular:    No cardiovascular symptoms, no CP or palpitations   Pulmonary:           No pulmonary symptoms, no SOB or cough    Gastrointestinal:   No gastrointestinal symptoms, no diarrhea or constipation   Genitourinary:       increased urination - for many years; urinates ~1 times a night  Endocrine:            No endocrine symptoms, no cold or heat intolerance   Neurological:        No neurological symptoms, no headaches, no tremor, no numbness or tingling sensation, no dizziness   Musculoskeletal:  Lower back pain present intermittently   Skin:                     no dry skin, no hair falling out; no changes of the facial hair  Psychological:      Depression - longstanding and controlled                 Vital Signs     Previous Weights:    Wt Readings from Last 10 Encounters:   01/22/20 61.2 kg (135 lb)   12/24/19 64.5 kg (142 lb 4.8 oz)   12/02/19 62.5 kg (137 lb 12.8 oz)   12/20/18 70.2 kg (154 lb 12.8 oz)   12/03/18 68.9 kg (152 lb)   12/13/17 71.4 kg (157 lb 4.8 oz)   07/06/16 64.4 kg (142 lb)   04/22/15 61.9 kg (136 lb 6.4 oz)   11/08/13 61 kg (134 lb 8 oz)   12/12/12 60.8 kg (134 lb)        There were no vitals taken for this visit.    Physical Exam  General Appearance: alert, no distress noted   Eyes: grossly normal to inspection, conjunctivae and sclerae normal, no lid lag or stare   Respiratory: no audible wheeze, cough, or visible cyanosis.  No visible retractions or increased work of breathing.  Able to speak fully in complete sentences.  Neurological: Cranial nerves grossly intact, mentation intact and speech normal; no tremor of the outstretched hands   Skin: no lesions on exposed skin    Psychological: mentation appears normal, affect normal, judgement and insight intact, normal speech and appearance well-groomed    Lab Results  I reviewed prior lab results documented in Epic.  TSH   Date Value Ref Range Status   08/07/2008 0.34 mcU/mL Final      Ref. Range 12/2/2019 08:31 12/26/2019 07:27 11/19/2020 16:10   17-OH Progesterone Latest Units: ng/dL 1,115  1,469   Androstenedione Latest Ref Range: 0.330 - 1.340 ng/mL 1.580 (H)  2.034 (H)   Testosterone Total Latest Ref Range: 240 - 950 ng/dL 413  334   Renin Activity Latest Units: ng/mL/hr 0.9     Glucose Latest Ref Range: 70 - 99 mg/dL 75 80 54 (L)   Sodium Latest Ref Range: 133 - 144 mmol/L 138 133 137   Potassium Latest Ref Range: 3.4 - 5.3 mmol/L 3.6 4.2 3.7   Lutropin Latest Ref Range: 1.5 - 9.3 IU/L 5.8  2.0   Free Testosterone Calculated Latest Ref Range: 4.7 - 24.4 ng/dL 6.90  5.52   Sex Hormone Binding Globulin Latest Ref Range: 11 - 80 nmol/L 45  44     Assessment:    1.  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  It is interesting that the patient has a family history of congenital adrenal hyperplasia in his mother.   Clinically, he is asymptomatic. The androstenedione to testosterone ratio on the most recent labwork was 0.6, with the caveat that the testosterone level was checked in the afternoon.    Recommendations:  Continue current dose of hydrocortisone and Florinef  Schedule an ultrasound of the testes in 1 year  Follow-up lab work in 1 year.  I reminded the patient to try to have the labs done in the morning, fasting.    2.  Onychomycosis  Topical antifungals are generally not effective in treating this condition.  Reassurance provided.    3.  Asymptomatic hypoglycemia  The patient denies experiencing symptoms of hypoglycemia.  No further evaluation required.    Orders Placed This Encounter   Procedures     US Testicular and Scrotum     Lutropin     Follicle stimulating hormone      Testosterone Free and Total     Androstenedione     Comprehensive metabolic panel     Renin activity       Deja Warner MD

## 2020-12-18 DIAGNOSIS — E27.40 ADRENAL INSUFFICIENCY (H): ICD-10-CM

## 2020-12-19 RX ORDER — HYDROCORTISONE 10 MG/1
TABLET ORAL
Qty: 270 TABLET | Refills: 3 | Status: SHIPPED | OUTPATIENT
Start: 2020-12-19 | End: 2021-11-09

## 2020-12-19 NOTE — TELEPHONE ENCOUNTER
hydrocortisone (CORTEF) 10 MG tablet   Last Written Prescription Date:  12/24/19  Last Fill Quantity: 270,   # refills: 3  Last Office Visit : 11/30/20 jasmine Warner  Future Office visit:  none

## 2020-12-28 ENCOUNTER — TELEPHONE (OUTPATIENT)
Dept: FAMILY MEDICINE | Facility: CLINIC | Age: 30
End: 2020-12-28

## 2020-12-28 DIAGNOSIS — J45.40 ASTHMA, MODERATE PERSISTENT, WELL-CONTROLLED: ICD-10-CM

## 2020-12-28 RX ORDER — BUDESONIDE AND FORMOTEROL FUMARATE DIHYDRATE 160; 4.5 UG/1; UG/1
2 AEROSOL RESPIRATORY (INHALATION) 2 TIMES DAILY
Qty: 3 INHALER | Refills: 1 | Status: SHIPPED | OUTPATIENT
Start: 2020-12-28 | End: 2022-06-15

## 2020-12-28 RX ORDER — BUDESONIDE AND FORMOTEROL FUMARATE DIHYDRATE 160; 4.5 UG/1; UG/1
2 AEROSOL RESPIRATORY (INHALATION) 2 TIMES DAILY
Qty: 3 INHALER | Refills: 1 | Status: SHIPPED | OUTPATIENT
Start: 2020-12-28 | End: 2020-12-28

## 2020-12-28 NOTE — TELEPHONE ENCOUNTER
[facility] Call Center    Phone Message    May a detailed message be left on voicemail: yes     Reason for Call: Medication Refill Request    Has the patient contacted the pharmacy for the refill? Yes   Name of medication being requested: SYMBICORT 160-4.5 MCG/ACT Inhaler  Provider who prescribed the medication: Bettie Celaya  Pharmacy:    [facility]/PH[facility]ACY #[id number] - [place], [place] - [address] AT [facility]    Date medication is needed: ASAP - Patient's prescription  .         Action Taken: Other: NP    Travel Screening: Not Applicable

## 2021-01-18 DIAGNOSIS — E25.0 CONGENITAL ADRENAL HYPERPLASIA DUE TO 21-HYDROXYLASE DEFICIENCY (21-OH CAH), SIMPLE VIRILIZING (H): ICD-10-CM

## 2021-01-20 RX ORDER — FLUDROCORTISONE ACETATE 0.1 MG/1
0.1 TABLET ORAL DAILY
Qty: 90 TABLET | Refills: 2 | Status: SHIPPED | OUTPATIENT
Start: 2021-01-20 | End: 2021-10-22

## 2021-01-20 NOTE — TELEPHONE ENCOUNTER
fludrocortisone (FLORINEF) 0.1 MG tablet      Last Written Prescription Date:  4/30/20  Last Fill Quantity: 90,   # refills: 2  Last Office Visit : 11/30/20  Continue current dose of hydrocortisone and Florinef  Schedule an ultrasound of the testes in 1 year  Follow-up lab work in 1 year.  I reminded the patient to try to have the labs done in the morning, fasting.  Future Office visit:  None     Meets refill guidelines.

## 2021-03-14 ENCOUNTER — HEALTH MAINTENANCE LETTER (OUTPATIENT)
Age: 31
End: 2021-03-14

## 2021-10-20 DIAGNOSIS — E25.0 CONGENITAL ADRENAL HYPERPLASIA DUE TO 21-HYDROXYLASE DEFICIENCY (21-OH CAH), SIMPLE VIRILIZING (H): ICD-10-CM

## 2021-10-22 RX ORDER — FLUDROCORTISONE ACETATE 0.1 MG/1
0.1 TABLET ORAL DAILY
Qty: 30 TABLET | Refills: 0 | Status: SHIPPED | OUTPATIENT
Start: 2021-10-22 | End: 2021-11-09

## 2021-10-22 NOTE — TELEPHONE ENCOUNTER
fludrocortisone (FLORINEF) 0.1 MG tablet      Last Written Prescription Date:  1/20/2021  Last Fill Quantity: 90 tab,   # refills: 2  Last Office Visit : 11/30/2020  Future Office visit:  None    Appointment due around 11/30/2021:  Refill sent for 30 days and staff message sent to Endocrine clinic scheduling.    *Medication </= 0.1 mg QD

## 2021-10-24 ENCOUNTER — HEALTH MAINTENANCE LETTER (OUTPATIENT)
Age: 31
End: 2021-10-24

## 2021-10-26 ENCOUNTER — TELEPHONE (OUTPATIENT)
Dept: ENDOCRINOLOGY | Facility: CLINIC | Age: 31
End: 2021-10-26

## 2021-10-26 NOTE — TELEPHONE ENCOUNTER
fludrocortisone (FLORINEF) 0.1 MG tablet    Last Written Prescription Date:  10/22/21  Last Fill Quantity: 30,   # refills: 0  Last Office Visit : 11/30/20  Future Office visit:  11/9/20       Spoke to pharmacist, script received, will fill today. Lags due to COVID-19 boosters.They will have med ready for him today. Called patient- he understands and will  later today.

## 2021-10-26 NOTE — TELEPHONE ENCOUNTER
[facility] Call Center    Phone Message    May a detailed message be left on voicemail: yes     Reason for Call: Order(s): Other:   Reason for requested: Pt is needing new lab orders placed, so they can get labs done prior to their appt. Please place orders, and call Pt to confirm and schedule lab appt.  Date needed: prior to 11/9/21  Provider name: Alana      Action Taken: Message routed to:  [facility] endocrine    Travel Screening: Not Applicable

## 2021-10-26 NOTE — CONFIDENTIAL NOTE
Please note there are lab orders already in Epic for patient ordered by Dr. Warner at last office visit.     Please contact patient to schedule labs. They are to be done in the morning, fasting. As per Dr. Warner's note from November 2020.    Rae Bravo, RN  Endocrine Care Coordinator  [facility]

## 2021-10-26 NOTE — TELEPHONE ENCOUNTER
He has not had a testicular ultrasound since 2019.  In general, I recommend to have them done every 2 or 3 years.  If he wants to wait another year that is fine.

## 2021-10-26 NOTE — TELEPHONE ENCOUNTER
[facility] Call Center    Phone Message    May a detailed message be left on voicemail: yes     Reason for Call: Medication Refill Request    Has the patient contacted the pharmacy for the refill? Yes   Name of medication being requested: fludrocortisone (FLORINEF) 0.1 MG tablet  Provider who prescribed the medication: Alana  Pharmacy: [facility]/PH[facility]ACY #[id number] - [place], [place] - [address] AT [facility]  Date medication is needed: ASAP, Pt needing refill sent to this pharmacy, and Pt also requested that the clinic notify the pharmacy that the Pt is indeed scheduled for a follow up appt (11/9/21).         Action Taken: Message routed to:  [facility] endocrine    Travel Screening: Not Applicable

## 2021-10-27 NOTE — CONFIDENTIAL NOTE
Patient advised of Dr. Warner's recommendations. Patient verbalizes understanding and agrees to plan. Patient would like to do both the morning/fasting labs and the testicular ultrasound.     Patient is on the [place] schedule with Dr. Warner virtually and is typically seen at the [facility]. Patient would like to do labs and imaging at [facility].    Will send to [facility] clinic coordinators to help assist patient in arranging labs and ultrasound.        Rae Bravo RN  Endocrine Care Coordinator  [facility]

## 2021-11-02 ENCOUNTER — TELEPHONE (OUTPATIENT)
Dept: ENDOCRINOLOGY | Facility: CLINIC | Age: 31
End: 2021-11-02

## 2021-11-09 ENCOUNTER — VIRTUAL VISIT (OUTPATIENT)
Dept: ENDOCRINOLOGY | Facility: CLINIC | Age: 31
End: 2021-11-09
Payer: COMMERCIAL

## 2021-11-09 DIAGNOSIS — E25.0 CONGENITAL ADRENAL HYPERPLASIA DUE TO 21-HYDROXYLASE DEFICIENCY (21-OH CAH), SIMPLE VIRILIZING (H): ICD-10-CM

## 2021-11-09 DIAGNOSIS — Z91.199 NO-SHOW FOR APPOINTMENT: Primary | ICD-10-CM

## 2021-11-09 DIAGNOSIS — E27.40 ADRENAL INSUFFICIENCY (H): ICD-10-CM

## 2021-11-09 RX ORDER — FLUDROCORTISONE ACETATE 0.1 MG/1
0.1 TABLET ORAL DAILY
Qty: 90 TABLET | Refills: 3 | Status: SHIPPED | OUTPATIENT
Start: 2021-11-09 | End: 2022-10-29

## 2021-11-09 RX ORDER — HYDROCORTISONE 10 MG/1
TABLET ORAL
Qty: 270 TABLET | Refills: 3 | Status: SHIPPED | OUTPATIENT
Start: 2021-11-09 | End: 2023-01-03

## 2021-11-09 RX ORDER — MIRTAZAPINE 15 MG/1
7.5 TABLET, FILM COATED ORAL AT BEDTIME
COMMUNITY
Start: 2021-10-15 | End: 2023-09-20

## 2021-11-09 RX ORDER — ZIPRASIDONE HYDROCHLORIDE 20 MG/1
20 CAPSULE ORAL DAILY
COMMUNITY
Start: 2021-10-24 | End: 2023-09-20

## 2021-11-09 NOTE — LETTER
11/9/2021         RE: Laci Dsouza  [address]  [place] [address]        Dear Colleague,    Thank you for referring your patient, Laci Dsouza, to the [facility]. Please see a copy of my visit note below.    Laci is a 31 year old who is being evaluated via a billable video visit.      How would you like to obtain your AVS? [facility]  If the video visit is dropped, the invitation should be resent by: Send to e-mail at: [email or web address]  Will anyone else be joining your video visit? No    Lanette Barker [facility]  Adult Endocrinology  [facility]      Patient called by [employer].  He expresses his disappointment with the customer service [facility] provides.  He was not able to connect via [facility] and he finds the [employer] internet connection unstable.  He was not able to schedule the testicular ultrasound prior to this appointment and he finds this to be extremely frustrating.  I offered to schedule him for an in person visit and he agreed to be seen on December 13, at 12 PM ([facility]).  Lab work were scheduled for this Thursday.  This patient was a no show for this scheduled appointment.      Again, thank you for allowing me to participate in the care of your patient.        Sincerely,        Deja Warner MD

## 2021-11-09 NOTE — PROGRESS NOTES
Patient called by [employer].  He expresses his disappointment with the customer service [facility] provides.  He was not able to connect via [employer] and he finds the video [email or web address] internet connection unstable.  He was not able to schedule the testicular ultrasound prior to this appointment and he finds this to be extremely frustrating.  I offered to schedule him for an in person visit and he agreed to be seen on December 13, at 12 PM ([facility]).  Lab work were scheduled for this Thursday.  This patient was a no show for this scheduled appointment.

## 2021-11-09 NOTE — PROGRESS NOTES
Laci is a 31 year old who is being evaluated via a billable video visit.      How would you like to obtain your AVS? MyChart  If the video visit is dropped, the invitation should be resent by: Send to e-mail at: [email or web address]  Will anyone else be joining your video visit? No    Lanette Barker CMA  Adult Endocrinology  [facility]

## 2021-12-13 ENCOUNTER — ANCILLARY PROCEDURE (OUTPATIENT)
Dept: ULTRASOUND IMAGING | Facility: CLINIC | Age: 31
End: 2021-12-13
Attending: INTERNAL MEDICINE
Payer: COMMERCIAL

## 2021-12-13 ENCOUNTER — LAB (OUTPATIENT)
Dept: LAB | Facility: CLINIC | Age: 31
End: 2021-12-13
Payer: COMMERCIAL

## 2021-12-13 ENCOUNTER — TELEPHONE (OUTPATIENT)
Dept: ENDOCRINOLOGY | Facility: CLINIC | Age: 31
End: 2021-12-13

## 2021-12-13 DIAGNOSIS — E25.0 CONGENITAL ADRENAL CORTICAL HYPERPLASIA (H): ICD-10-CM

## 2021-12-13 DIAGNOSIS — E25.0 CONGENITAL ADRENAL HYPERPLASIA (H): ICD-10-CM

## 2021-12-13 DIAGNOSIS — E25.0 CONGENITAL ADRENAL HYPERPLASIA DUE TO 21-HYDROXYLASE DEFICIENCY (21-OH CAH), SIMPLE VIRILIZING (H): ICD-10-CM

## 2021-12-13 LAB
ALBUMIN SERPL-MCNC: 3.8 G/DL (ref 3.4–5)
ALP SERPL-CCNC: 71 U/L (ref 40–150)
ALT SERPL W P-5'-P-CCNC: 30 U/L (ref 0–70)
ANION GAP SERPL CALCULATED.3IONS-SCNC: 7 MMOL/L (ref 3–14)
AST SERPL W P-5'-P-CCNC: 11 U/L (ref 0–45)
BILIRUB SERPL-MCNC: 0.2 MG/DL (ref 0.2–1.3)
BUN SERPL-MCNC: 12 MG/DL (ref 7–30)
CALCIUM SERPL-MCNC: 8.9 MG/DL (ref 8.5–10.1)
CHLORIDE BLD-SCNC: 106 MMOL/L (ref 94–109)
CO2 SERPL-SCNC: 28 MMOL/L (ref 20–32)
CORTIS SERPL-MCNC: 6.1 UG/DL (ref 4–22)
CREAT SERPL-MCNC: 0.7 MG/DL (ref 0.66–1.25)
FSH SERPL-ACNC: 2.8 IU/L (ref 0.7–10.8)
GFR SERPL CREATININE-BSD FRML MDRD: >90 ML/MIN/1.73M2
GLUCOSE BLD-MCNC: 103 MG/DL (ref 70–99)
HOLD SPECIMEN: NORMAL
HOLD SPECIMEN: NORMAL
LH SERPL-ACNC: 3.4 IU/L (ref 1.5–9.3)
POTASSIUM BLD-SCNC: 3.6 MMOL/L (ref 3.4–5.3)
PROT SERPL-MCNC: 7.1 G/DL (ref 6.8–8.8)
SODIUM SERPL-SCNC: 141 MMOL/L (ref 133–144)

## 2021-12-13 PROCEDURE — 84403 ASSAY OF TOTAL TESTOSTERONE: CPT | Mod: 90 | Performed by: PATHOLOGY

## 2021-12-13 PROCEDURE — 36415 COLL VENOUS BLD VENIPUNCTURE: CPT | Performed by: PATHOLOGY

## 2021-12-13 PROCEDURE — 99000 SPECIMEN HANDLING OFFICE-LAB: CPT | Performed by: PATHOLOGY

## 2021-12-13 PROCEDURE — 82024 ASSAY OF ACTH: CPT | Mod: 90 | Performed by: PATHOLOGY

## 2021-12-13 PROCEDURE — 83001 ASSAY OF GONADOTROPIN (FSH): CPT | Mod: 90 | Performed by: PATHOLOGY

## 2021-12-13 PROCEDURE — 76870 US EXAM SCROTUM: CPT | Mod: GC | Performed by: RADIOLOGY

## 2021-12-13 PROCEDURE — 82627 DEHYDROEPIANDROSTERONE: CPT | Mod: 90 | Performed by: PATHOLOGY

## 2021-12-13 PROCEDURE — 80053 COMPREHEN METABOLIC PANEL: CPT | Performed by: PATHOLOGY

## 2021-12-13 PROCEDURE — 84244 ASSAY OF RENIN: CPT | Mod: 90 | Performed by: PATHOLOGY

## 2021-12-13 PROCEDURE — 84270 ASSAY OF SEX HORMONE GLOBUL: CPT | Mod: 90 | Performed by: PATHOLOGY

## 2021-12-13 PROCEDURE — 83002 ASSAY OF GONADOTROPIN (LH): CPT | Mod: 90 | Performed by: PATHOLOGY

## 2021-12-13 PROCEDURE — 82533 TOTAL CORTISOL: CPT | Mod: 90 | Performed by: PATHOLOGY

## 2021-12-13 PROCEDURE — 82157 ASSAY OF ANDROSTENEDIONE: CPT | Mod: 90 | Performed by: PATHOLOGY

## 2021-12-13 NOTE — TELEPHONE ENCOUNTER
Talked with  this morning. Patient needs IN PERSON visit. Called patient left VM informing him that  is not in office today. She is next in office Monday the 20th of December has two openings at 10:30 and 11. Left a detailed VM with Endo Clinic number.   Taylor Myhre

## 2021-12-14 LAB
ACTH PLAS-MCNC: 15 PG/ML
DHEA-S SERPL-MCNC: 99 UG/DL (ref 80–560)
SHBG SERPL-SCNC: 37 NMOL/L (ref 11–80)

## 2021-12-15 LAB
TESTOST FREE SERPL-MCNC: 2.71 NG/DL
TESTOST SERPL-MCNC: 155 NG/DL (ref 240–950)

## 2021-12-16 LAB — RENIN PLAS-CCNC: 3.7 NG/ML/HR

## 2021-12-17 LAB — ANDROST SERPL-MCNC: 1.28 NG/ML

## 2021-12-20 ENCOUNTER — OFFICE VISIT (OUTPATIENT)
Dept: ENDOCRINOLOGY | Facility: CLINIC | Age: 31
End: 2021-12-20
Payer: COMMERCIAL

## 2021-12-20 VITALS
WEIGHT: 160 LBS | SYSTOLIC BLOOD PRESSURE: 144 MMHG | BODY MASS INDEX: 24.25 KG/M2 | HEART RATE: 101 BPM | DIASTOLIC BLOOD PRESSURE: 72 MMHG | HEIGHT: 68 IN

## 2021-12-20 DIAGNOSIS — R53.83 FATIGUE, UNSPECIFIED TYPE: ICD-10-CM

## 2021-12-20 DIAGNOSIS — R79.89 LOW TESTOSTERONE: ICD-10-CM

## 2021-12-20 DIAGNOSIS — E25.0 CONGENITAL ADRENAL HYPERPLASIA DUE TO 21-HYDROXYLASE DEFICIENCY (21-OH CAH), SIMPLE VIRILIZING (H): Primary | ICD-10-CM

## 2021-12-20 DIAGNOSIS — D35.00 ADRENAL REST TUMOR: ICD-10-CM

## 2021-12-20 DIAGNOSIS — R79.89 LOW TSH LEVEL: ICD-10-CM

## 2021-12-20 PROCEDURE — 99214 OFFICE O/P EST MOD 30 MIN: CPT | Performed by: INTERNAL MEDICINE

## 2021-12-20 ASSESSMENT — PATIENT HEALTH QUESTIONNAIRE - PHQ9: SUM OF ALL RESPONSES TO PHQ QUESTIONS 1-9: 12

## 2021-12-20 ASSESSMENT — PAIN SCALES - GENERAL: PAINLEVEL: NO PAIN (0)

## 2021-12-20 ASSESSMENT — MIFFLIN-ST. JEOR: SCORE: 1650.26

## 2021-12-20 NOTE — PROGRESS NOTES
Laci Dsouza is a 31 year old male with congenital adrenal hyperplasia, on hydrocortisone and Florinef therapy.  He was previously seen by Dr. Cordova and he established care with me in 2018. The patient was diagnosed at 1 month of age and has been on lifelong hormone replacement.  Current dose of hydrocortisone is 20 mg in the morning, when he wakes up, and 10 mg around 2 PM.  He takes the Florinef dose in the morning, 0.1 mg daily.     On the most recent lab work from 12/13/2021, done at 5:30 PM, CMP was normal, renin was 3.7, ACTH was 15, androstendione was normal at 1284, cortisol level was 6.1 at 5:30 PM, DHEA-S was 99, both total and free testosterone were low (155 and 2.71, respectively), LH was 3.4 and FSH was 2.8.    The testicular US from 12/2018 revealed irregular similar-appearing hypoechoic foci in the left and right testes, measuring 1.1 and 2.7 cm, suggestive of testicular adrenal rests. They remained stable on the f/up testicular US from 12/2019.  I reviewed the most recent testicular ultrasound from 12/13/2021.  There are no significant changes of the testicular adrenal rest tumors.    Laci reports feeling extremely tired, with no energy.  He has been seeing a mental health specialist, as he continues to deal with depression.  His weight is up.  During the pandemic, he has not been exercising regularly.  Endorses some deconditioning, with shortness of breath with more intense physical activity/exercise.    Past Medical History   Past Medical History:   Diagnosis Date     Anxiety      Aortic atresia or stenosis, congenital 12/2001    Repair 2001 after had MI;     Asthma, moderate persistent, well-controlled      Congenital adrenal hyperplasia (H) 1989    Dx at 1 mo of age; mother with CAH also (?)   Depression   No fractures  Asthma  Urinary urgency/retention -evaluated in the urology clinic in 2020    Past Surgical History   Past Surgical History:   Procedure Laterality Date     aortic stenosis s/p  "surgical repair including cor reconstruction 2001         Current Medications    Current Outpatient Medications:      albuterol (PROAIR RESPICLICK) 108 (90 Base) MCG/ACT inhaler, Inhale 2 puffs into the lungs every 4 hours as needed for shortness of breath / dyspnea or wheezing, Disp: 1 Inhaler, Rfl: 0     fludrocortisone (FLORINEF) 0.1 MG tablet, Take 1 tablet (0.1 mg) by mouth daily, Disp: 90 tablet, Rfl: 3     hydrocortisone (CORTEF) 10 MG tablet, TAKE 2 TABS BY MOUTH IN IN THE MORNING AND 1 TAB IN AFTERNOON/EVENING, Disp: 270 tablet, Rfl: 3     hydrocortisone sodium succinate PF (SOLU-CORTEF) 100 MG injection, Inject 2 mLs (100 mg) into the muscle once for 1 dose Dispense as Act-O-Vial, Disp: 2 mL, Rfl: 1     mirtazapine (REMERON) 15 MG tablet, Take 7.5 mg by mouth At Bedtime, Disp: , Rfl:      SYMBICORT 160-4.5 MCG/ACT Inhaler, Inhale 2 puffs into the lungs 2 times daily, Disp: 3 Inhaler, Rfl: 1     Syringe/Needle, Disp, (SYRINGE LUER LOCK) 23G X 1\" 3 ML MISC, 100 mg as needed, Disp: 3 each, Rfl: 3     venlafaxine (EFFEXOR-XR) 150 MG 24 hr capsule, Take 1 capsule (150 mg) by mouth daily Total daily dose 225 mg., Disp: 90 capsule, Rfl: 1     ziprasidone (GEODON) 20 MG capsule, Take 20 mg by mouth daily, Disp: , Rfl:      Family History   Mother - congenital adrenal hyperplasia. Father - CVA in his 60s.     Social History  Single. No children. He might be interested in adopting, in the future.  Former smoker, 1/2 PPD for 10 years.  Denies using illicit drugs. 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     Review of Systems   Systemic:             Weight up ~20 lbs in the last year  Eye:                      No eye symptoms   Lizeth-Laryngeal:     No lizeth-laryngeal symptoms, no dysphagia, no hoarseness, no cough     Breast:                  No breast symptoms  Cardiovascular:    No cardiovascular symptoms, no CP or palpitations   Pulmonary:           No pulmonary symptoms, no SOB or cough  " "  Gastrointestinal:   No gastrointestinal symptoms, no diarrhea or constipation   Genitourinary:       increased urination - for many years; urinates ~1 times a night  Endocrine:            No endocrine symptoms, no cold or heat intolerance; no ED    Neurological:        No neurological symptoms, no headaches, no tremor, no numbness or tingling sensation, no dizziness   Musculoskeletal:  Lower back pain present intermittently   Skin:                     no dry skin, no hair falling out; no changes of the facial hair; no acne, no stretch marks; no easy bruising   Psychological:      Depression - longstanding and controlled                 Vital Signs     Previous Weights:    Wt Readings from Last 10 Encounters:   12/20/21 72.6 kg (160 lb)   01/22/20 61.2 kg (135 lb)   12/24/19 64.5 kg (142 lb 4.8 oz)   12/02/19 62.5 kg (137 lb 12.8 oz)   12/20/18 70.2 kg (154 lb 12.8 oz)   12/03/18 68.9 kg (152 lb)   12/13/17 71.4 kg (157 lb 4.8 oz)   07/06/16 64.4 kg (142 lb)   04/22/15 61.9 kg (136 lb 6.4 oz)   11/08/13 61 kg (134 lb 8 oz)     BP Readings from Last 6 Encounters:   12/20/21 (!) 144/72   11/19/20 122/83   01/22/20 (!) 147/69   12/24/19 124/73   12/02/19 129/78   12/20/18 136/74        BP (!) 144/72 (BP Location: Left arm, Patient Position: Sitting, Cuff Size: Adult Regular)   Pulse 101   Ht 1.727 m (5' 8\")   Wt 72.6 kg (160 lb)   BMI 24.33 kg/m    Blood pressure on recheck 138/78    General appearance: he is well-developed, well-nourished, and in no distress.  Eyes: conjunctivae and extraocular motions are normal. Pupils are equal, round, and reactive to light. No lid lag, no stare.  HENT: oropharynx clear and moist; neck no JVD, no bruits, no thyromegaly, no palpable nodules  Cardiovascular: regular rhythm, hyperkinetic, no murmurs, distal pulses palpable, no edema  Respiratory: chest clear, no rales, no rhonchi  Gastrointestinal: abdomen soft, nontender, nondistended, +BS, no organomegaly  Musculoskeletal: " normal tone and strength   Neurologic: reflexes normal and symmetric, fine resting tremor of the outstretched hands  Psychiatric: affect and judgment normal   Skin: No stretch marks, no bruises, no hyperpigmentation    Lab Results  I reviewed prior lab results documented in Epic.  TSH   Date Value Ref Range Status   08/07/2008 0.34 mcU/mL Final     Assessment:    1.  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    The patient has gained some weight but, otherwise, he does not endorse signs or symptoms suggestive of steroid overtreatment.  The adrenal allergens are well controlled.  The electrolytes have been normal on recent labs.  Testosterone level was low on recent lab work, but it was checked in the afternoon.   Recommendations:  Reestablished a regular exercise routine  Continue current dose of hydrocortisone and Florinef  Check morning testosterone level    2.  Adrenal rest tumors  Stable on the follow-up ultrasound from this month.  Consider a follow-up ultrasound of the testis in 2024.     2. Fatigue, of unclear etiology  Might be related to depression.   Follow-up reflex TSH, CBC, ESR and CRP.    Orders Placed This Encounter   Procedures     CRP inflammation     Erythrocyte sedimentation rate auto     TSH with free T4 reflex     Testosterone Free and Total     CBC with Platelets & Differential

## 2021-12-20 NOTE — LETTER
12/20/2021       RE: Laci Dsouza  [address] [place] [address]     Dear Colleague,    Thank you for referring your patient, Laci Dsouza, to the [facility] ENDOCRINOLOGY CLINIC [place] at [facility]. Please see a copy of my visit note below.    Laci Dsouza is a 31 year old male with congenital adrenal hyperplasia, on hydrocortisone and Florinef therapy.  He was previously seen by Dr. Cordova and he established care with me in 2018. The patient was diagnosed at 1 month of age and has been on lifelong hormone replacement.  Current dose of hydrocortisone is 20 mg in the morning, when he wakes up, and 10 mg around 2 PM.  He takes the Florinef dose in the morning, 0.1 mg daily.     On the most recent lab work from 12/13/2021, done at 5:30 PM, CMP was normal, renin was 3.7, ACTH was 15, androstendione was normal at 1284, cortisol level was 6.1 at 5:30 PM, DHEA-S was 99, both total and free testosterone were low (155 and 2.71, respectively), LH was 3.4 and FSH was 2.8.    The testicular US from 12/2018 revealed irregular similar-appearing hypoechoic foci in the left and right testes, measuring 1.1 and 2.7 cm, suggestive of testicular adrenal rests. They remained stable on the f/up testicular US from 12/2019.  I reviewed the most recent testicular ultrasound from 12/13/2021.  There are no significant changes of the testicular adrenal rest tumors.    Laci reports feeling extremely tired, with no energy.  He has been seeing a mental health specialist, as he continues to deal with depression.  His weight is up.  During the pandemic, he has not been exercising regularly.  Endorses some deconditioning, with shortness of breath with more intense physical activity/exercise.    Past Medical History   Past Medical History:   Diagnosis Date     Anxiety      Aortic atresia or stenosis, congenital 12/2001    Repair 2001 after had MI;     Asthma, moderate  "persistent, well-controlled      Congenital adrenal hyperplasia (H) 1989    Dx at 1 mo of age; mother with CAH also (?)   Depression   No fractures  Asthma  Urinary urgency/retention -evaluated in the urology clinic in 2020    Past Surgical History   Past Surgical History:   Procedure Laterality Date     aortic stenosis s/p surgical repair including cor reconstruction 2001         Current Medications    Current Outpatient Medications:      albuterol (PROAIR RESPICLICK) 108 (90 Base) MCG/ACT inhaler, Inhale 2 puffs into the lungs every 4 hours as needed for shortness of breath / dyspnea or wheezing, Disp: 1 Inhaler, Rfl: 0     fludrocortisone (FLORINEF) 0.1 MG tablet, Take 1 tablet (0.1 mg) by mouth daily, Disp: 90 tablet, Rfl: 3     hydrocortisone (CORTEF) 10 MG tablet, TAKE 2 TABS BY MOUTH IN IN THE MORNING AND 1 TAB IN AFTERNOON/EVENING, Disp: 270 tablet, Rfl: 3     hydrocortisone sodium succinate PF (SOLU-CORTEF) 100 MG injection, Inject 2 mLs (100 mg) into the muscle once for 1 dose Dispense as Act-O-Vial, Disp: 2 mL, Rfl: 1     mirtazapine (REMERON) 15 MG tablet, Take 7.5 mg by mouth At Bedtime, Disp: , Rfl:      SYMBICORT 160-4.5 MCG/ACT Inhaler, Inhale 2 puffs into the lungs 2 times daily, Disp: 3 Inhaler, Rfl: 1     Syringe/Needle, Disp, (SYRINGE LUER LOCK) 23G X 1\" 3 ML MISC, 100 mg as needed, Disp: 3 each, Rfl: 3     venlafaxine (EFFEXOR-XR) 150 MG 24 hr capsule, Take 1 capsule (150 mg) by mouth daily Total daily dose 225 mg., Disp: 90 capsule, Rfl: 1     ziprasidone (GEODON) 20 MG capsule, Take 20 mg by mouth daily, Disp: , Rfl:      Family History   Mother - congenital adrenal hyperplasia. Father - CVA in his 60s.     Social History  Single. No children. He might be interested in adopting, in the future.  Former smoker, 1/2 PPD for 10 years.  Denies using illicit drugs. 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     Review of Systems   Systemic:             Weight up ~20 lbs in the last " "year  Eye:                      No eye symptoms   Lizeth-Laryngeal:     No lizeth-laryngeal symptoms, no dysphagia, no hoarseness, no cough     Breast:                  No breast symptoms  Cardiovascular:    No cardiovascular symptoms, no CP or palpitations   Pulmonary:           No pulmonary symptoms, no SOB or cough    Gastrointestinal:   No gastrointestinal symptoms, no diarrhea or constipation   Genitourinary:       increased urination - for many years; urinates ~1 times a night  Endocrine:            No endocrine symptoms, no cold or heat intolerance; no ED    Neurological:        No neurological symptoms, no headaches, no tremor, no numbness or tingling sensation, no dizziness   Musculoskeletal:  Lower back pain present intermittently   Skin:                     no dry skin, no hair falling out; no changes of the facial hair; no acne, no stretch marks; no easy bruising   Psychological:      Depression - longstanding and controlled                 Vital Signs     Previous Weights:    Wt Readings from Last 10 Encounters:   12/20/21 72.6 kg (160 lb)   01/22/20 61.2 kg (135 lb)   12/24/19 64.5 kg (142 lb 4.8 oz)   12/02/19 62.5 kg (137 lb 12.8 oz)   12/20/18 70.2 kg (154 lb 12.8 oz)   12/03/18 68.9 kg (152 lb)   12/13/17 71.4 kg (157 lb 4.8 oz)   07/06/16 64.4 kg (142 lb)   04/22/15 61.9 kg (136 lb 6.4 oz)   11/08/13 61 kg (134 lb 8 oz)     BP Readings from Last 6 Encounters:   12/20/21 (!) 144/72   11/19/20 122/83   01/22/20 (!) 147/69   12/24/19 124/73   12/02/19 129/78   12/20/18 136/74        BP (!) 144/72 (BP Location: Left arm, Patient Position: Sitting, Cuff Size: Adult Regular)   Pulse 101   Ht 1.727 m (5' 8\")   Wt 72.6 kg (160 lb)   BMI 24.33 kg/m    Blood pressure on recheck 138/78    General appearance: he is well-developed, well-nourished, and in no distress.  Eyes: conjunctivae and extraocular motions are normal. Pupils are equal, round, and reactive to light. No lid lag, no stare.  HENT: oropharynx " clear and moist; neck no JVD, no bruits, no thyromegaly, no palpable nodules  Cardiovascular: regular rhythm, hyperkinetic, no murmurs, distal pulses palpable, no edema  Respiratory: chest clear, no rales, no rhonchi  Gastrointestinal: abdomen soft, nontender, nondistended, +BS, no organomegaly  Musculoskeletal: normal tone and strength   Neurologic: reflexes normal and symmetric, fine resting tremor of the outstretched hands  Psychiatric: affect and judgment normal   Skin: No stretch marks, no bruises, no hyperpigmentation    Lab Results  I reviewed prior lab results documented in Epic.  TSH   Date Value Ref Range Status   08/07/2008 0.34 mcU/mL Final     Assessment:    1.  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    The patient has gained some weight but, otherwise, he does not endorse signs or symptoms suggestive of steroid overtreatment.  The adrenal allergens are well controlled.  The electrolytes have been normal on recent labs.  Testosterone level was low on recent lab work, but it was checked in the afternoon.   Recommendations:  Reestablished a regular exercise routine  Continue current dose of hydrocortisone and Florinef  Check morning testosterone level    2.  Adrenal rest tumors  Stable on the follow-up ultrasound from this month.  Consider a follow-up ultrasound of the testis in 2024.     2. Fatigue, of unclear etiology  Might be related to depression.   Follow-up reflex TSH, CBC, ESR and CRP.    Orders Placed This Encounter   Procedures     CRP inflammation     Erythrocyte sedimentation rate auto     TSH with free T4 reflex     Testosterone Free and Total     CBC with Platelets & Differential       Deja Warner MD

## 2021-12-20 NOTE — NURSING NOTE
"Chief Complaint   Patient presents with     Thyroid Problem     Vital signs:      BP: (!) 144/72 Pulse: 101           Height: 172.7 cm (5' 8\") Weight: 72.6 kg (160 lb)  Estimated body mass index is 24.33 kg/m  as calculated from the following:    Height as of this encounter: 1.727 m (5' 8\").    Weight as of this encounter: 72.6 kg (160 lb).        "

## 2021-12-22 ENCOUNTER — LAB (OUTPATIENT)
Dept: LAB | Facility: CLINIC | Age: 31
End: 2021-12-22
Payer: COMMERCIAL

## 2021-12-22 DIAGNOSIS — R53.83 FATIGUE, UNSPECIFIED TYPE: ICD-10-CM

## 2021-12-22 DIAGNOSIS — R79.89 LOW TESTOSTERONE: ICD-10-CM

## 2021-12-22 LAB
BASOPHILS # BLD AUTO: 0.1 10E3/UL (ref 0–0.2)
BASOPHILS NFR BLD AUTO: 1 %
CRP SERPL-MCNC: <2.9 MG/L (ref 0–8)
EOSINOPHIL # BLD AUTO: 0.2 10E3/UL (ref 0–0.7)
EOSINOPHIL NFR BLD AUTO: 3 %
ERYTHROCYTE [DISTWIDTH] IN BLOOD BY AUTOMATED COUNT: 13.8 % (ref 10–15)
ERYTHROCYTE [SEDIMENTATION RATE] IN BLOOD BY WESTERGREN METHOD: 7 MM/HR (ref 0–15)
HCT VFR BLD AUTO: 41.8 % (ref 40–53)
HGB BLD-MCNC: 13.9 G/DL (ref 13.3–17.7)
IMM GRANULOCYTES # BLD: 0 10E3/UL
IMM GRANULOCYTES NFR BLD: 1 %
LYMPHOCYTES # BLD AUTO: 1.3 10E3/UL (ref 0.8–5.3)
LYMPHOCYTES NFR BLD AUTO: 19 %
MCH RBC QN AUTO: 28.5 PG (ref 26.5–33)
MCHC RBC AUTO-ENTMCNC: 33.3 G/DL (ref 31.5–36.5)
MCV RBC AUTO: 86 FL (ref 78–100)
MONOCYTES # BLD AUTO: 0.5 10E3/UL (ref 0–1.3)
MONOCYTES NFR BLD AUTO: 7 %
NEUTROPHILS # BLD AUTO: 4.6 10E3/UL (ref 1.6–8.3)
NEUTROPHILS NFR BLD AUTO: 69 %
NRBC # BLD AUTO: 0 10E3/UL
NRBC BLD AUTO-RTO: 0 /100
PLATELET # BLD AUTO: 305 10E3/UL (ref 150–450)
RBC # BLD AUTO: 4.87 10E6/UL (ref 4.4–5.9)
SHBG SERPL-SCNC: 40 NMOL/L (ref 11–80)
T4 FREE SERPL-MCNC: 0.86 NG/DL (ref 0.76–1.46)
TSH SERPL DL<=0.005 MIU/L-ACNC: 0.29 MU/L (ref 0.4–4)
WBC # BLD AUTO: 6.6 10E3/UL (ref 4–11)

## 2021-12-22 PROCEDURE — 84270 ASSAY OF SEX HORMONE GLOBUL: CPT | Mod: 90 | Performed by: PATHOLOGY

## 2021-12-22 PROCEDURE — 36415 COLL VENOUS BLD VENIPUNCTURE: CPT | Performed by: PATHOLOGY

## 2021-12-22 PROCEDURE — 84146 ASSAY OF PROLACTIN: CPT | Mod: 90 | Performed by: PATHOLOGY

## 2021-12-22 PROCEDURE — 84439 ASSAY OF FREE THYROXINE: CPT | Performed by: PATHOLOGY

## 2021-12-22 PROCEDURE — 99000 SPECIMEN HANDLING OFFICE-LAB: CPT | Performed by: PATHOLOGY

## 2021-12-22 PROCEDURE — 85025 COMPLETE CBC W/AUTO DIFF WBC: CPT | Performed by: PATHOLOGY

## 2021-12-22 PROCEDURE — 84445 ASSAY OF TSI GLOBULIN: CPT | Mod: 90 | Performed by: PATHOLOGY

## 2021-12-22 PROCEDURE — 85652 RBC SED RATE AUTOMATED: CPT | Performed by: PATHOLOGY

## 2021-12-22 PROCEDURE — 84403 ASSAY OF TOTAL TESTOSTERONE: CPT | Mod: 90 | Performed by: PATHOLOGY

## 2021-12-22 PROCEDURE — 84443 ASSAY THYROID STIM HORMONE: CPT | Performed by: PATHOLOGY

## 2021-12-22 PROCEDURE — 86140 C-REACTIVE PROTEIN: CPT | Performed by: PATHOLOGY

## 2021-12-23 LAB
TESTOST FREE SERPL-MCNC: 3.16 NG/DL
TESTOST SERPL-MCNC: 188 NG/DL (ref 240–950)

## 2021-12-24 LAB — PROLACTIN SERPL-MCNC: 30 UG/L (ref 2–18)

## 2021-12-24 NOTE — RESULT ENCOUNTER NOTE
Hi, Laci! I called but I was not able to leave a message.  The testosterone level has improved but it remains below the normal range.  The thyroid hormone level is a little elevated.  Most of the patients do not feel different when the thyroid hormone level is just borderline elevated.  However, it might explain the anxiety and the tremor we discussed about at the time of the visit.  I added a screening test for Graves' disease and a prolactin level to further look into the etiology of hyperthyroidism and low testosterone.  I am going to contact you when these tests are available.

## 2021-12-29 ENCOUNTER — TELEPHONE (OUTPATIENT)
Dept: INFECTIOUS DISEASES | Facility: CLINIC | Age: 31
End: 2021-12-29
Payer: COMMERCIAL

## 2021-12-29 LAB — TSI SER-ACNC: <1 TSI INDEX

## 2021-12-29 NOTE — TELEPHONE ENCOUNTER
[facility] Call Center    Phone Message    May a detailed message be left on voicemail: yes     Reason for Call: Other: .      Per Patient is wanting to get a call back. Patient states he received a call from he clinic and wasn't sure what the call was about. Writer had tried to look for an encounter about the call, Writer didn't find any. Please advise.     Action Taken: Message routed to:  [facility] Endo    Travel Screening: Not Applicable

## 2021-12-29 NOTE — TELEPHONE ENCOUNTER
Called pt and read the following note to him.  He stated understanding and agreement.  All questions answered.  Edu Taylor LPN on 12/29/2021 at 11:31 AM      12/22/2021 Note:  Deja Warner MD   Physician   Specialty:  Endocrinology, Diabetes, and Metabolism   Result QuickNote       Signed   Encounter Date:  12/22/2021                       []Hide copied text    []Hover for details    Hi, Laci! I called but I was not able to leave a message.  The testosterone level has improved but it remains below the normal range.  The thyroid hormone level is a little elevated.  Most of the patients do not feel different when the thyroid hormone level is just borderline elevated.  However, it might explain the anxiety and the tremor we discussed about at the time of the visit.  I added a screening test for Graves' disease and a prolactin level to further look into the etiology of hyperthyroidism and low testosterone.  I am going to contact you when these tests are available.

## 2021-12-31 ENCOUNTER — TELEPHONE (OUTPATIENT)
Dept: ENDOCRINOLOGY | Facility: CLINIC | Age: 31
End: 2021-12-31
Payer: COMMERCIAL

## 2021-12-31 NOTE — TELEPHONE ENCOUNTER
[facility] from Dr Warner and to Call clinic with questions. Clinic number provided.   Velia Johnson RN on 12/31/2021 at 11:14 AM       Deja Lang MD   Med Special[facility] Endo Triage-  Please call pt with my recommendations mentioned in the result note.         Deja Warner MD   12/31/2021 11:08 AM CST         The antibodies for Graves' disease (an autoimmune thyroid disease which can cause elevated thyroid hormone levels) came back negative.  Quite frequently, the thyroid hormone levels are mildly elevated as a result of a transient inflammation of the thyroid gland, which resolves with no intervention.  This is the reason I would recommend to have the thyroid hormone levels rechecked in 2 to 3 months, to determine if they are consistently elevated.    Prolactin is a hormone which can cause low testosterone levels. It came back only minimally elevated. Considering this and the fact that the prolactin level can be physiologically increased by stress and food intake, I recommend to have it rechecked together with your thyroid hormone levels.   I placed orders for you to have fasting labs done in 2-3 months. I will contact you with the results.

## 2021-12-31 NOTE — RESULT ENCOUNTER NOTE
The antibodies for Graves' disease (an autoimmune thyroid disease which can cause elevated thyroid hormone levels) came back negative.  Quite frequently, the thyroid hormone levels are mildly elevated as a result of a transient inflammation of the thyroid gland, which resolves with no intervention.  This is the reason I would recommend to have the thyroid hormone levels rechecked in 2 to 3 months, to determine if they are consistently elevated.    Prolactin is a hormone which can cause low testosterone levels. It came back only minimally elevated. Considering this and the fact that the prolactin level can be physiologically increased by stress and food intake, I recommend to have it rechecked together with your thyroid hormone levels.   I placed orders for you to have fasting labs done in 2-3 months. I will contact you with the results.

## 2022-01-10 ENCOUNTER — TELEPHONE (OUTPATIENT)
Dept: ENDOCRINOLOGY | Facility: CLINIC | Age: 32
End: 2022-01-10
Payer: COMMERCIAL

## 2022-01-10 NOTE — TELEPHONE ENCOUNTER
Spoke w/ Pt: read Lab review and recommendation from Dr Warner.   Confirmed understanding of lab draw in 2-3 months. Schedule number provided.   No questions at this time.   Velia Johnson RN on 1/10/2022 at 8:53 AM

## 2022-03-16 DIAGNOSIS — J45.40 ASTHMA, MODERATE PERSISTENT, WELL-CONTROLLED: ICD-10-CM

## 2022-03-18 NOTE — TELEPHONE ENCOUNTER
SYMBICORT 160-4.5 MCG INHALER  Last Written Prescription Date:   12/28/2020  Last Fill Quantity: 3,   # refills: 1  Last Office Visit :  11/19/2020  Future Office visit:  None    Routing refill request to provider for review/approval because:  Gaps in refills and office visits.  Last seen Nov 2020  Refer to clinic for review       Deja Gomez RN  Central Triage Red Flags/Med Refills

## 2022-03-25 RX ORDER — BUDESONIDE AND FORMOTEROL FUMARATE DIHYDRATE 160; 4.5 UG/1; UG/1
2 AEROSOL RESPIRATORY (INHALATION) 2 TIMES DAILY
OUTPATIENT
Start: 2022-03-25

## 2022-03-30 NOTE — TELEPHONE ENCOUNTER
Chief Complaint   Patient presents with     Consult     New pt consult     Blood pressure 107/67, pulse 83, weight 73.8 kg (162 lb 9.6 oz), SpO2 97 %.    Lakeisha Valderrama on 3/30/2022 at 2:25 PM     hydrocortisone  Last Written Prescription Date:  7/6/16  Last Fill Quantity: 270,   # refills: 3  Last Office Visit : 7/6/16  Future Office visit:  no    Routing refill request to provider for review/approval because:  Pt of Dr Cordova, will contact to schedule

## 2022-04-10 ENCOUNTER — HEALTH MAINTENANCE LETTER (OUTPATIENT)
Age: 32
End: 2022-04-10

## 2022-06-15 ENCOUNTER — MYC MEDICAL ADVICE (OUTPATIENT)
Dept: FAMILY MEDICINE | Facility: CLINIC | Age: 32
End: 2022-06-15
Payer: COMMERCIAL

## 2022-06-15 DIAGNOSIS — J45.40 ASTHMA, MODERATE PERSISTENT, WELL-CONTROLLED: ICD-10-CM

## 2022-06-15 RX ORDER — BUDESONIDE AND FORMOTEROL FUMARATE DIHYDRATE 160; 4.5 UG/1; UG/1
2 AEROSOL RESPIRATORY (INHALATION) 2 TIMES DAILY
Qty: 10.2 G | Refills: 0 | Status: SHIPPED | OUTPATIENT
Start: 2022-06-15 | End: 2022-07-28

## 2022-06-15 RX ORDER — ALBUTEROL SULFATE 90 UG/1
2 POWDER, METERED RESPIRATORY (INHALATION) EVERY 4 HOURS PRN
Qty: 1 EACH | Refills: 0 | Status: SHIPPED | OUTPATIENT
Start: 2022-06-15 | End: 2022-07-28

## 2022-06-16 RX ORDER — ALBUTEROL SULFATE 90 UG/1
2 POWDER, METERED RESPIRATORY (INHALATION) EVERY 4 HOURS PRN
Refills: 0 | OUTPATIENT
Start: 2022-06-16

## 2022-06-20 ENCOUNTER — OFFICE VISIT (OUTPATIENT)
Dept: FAMILY MEDICINE | Facility: CLINIC | Age: 32
End: 2022-06-20
Payer: COMMERCIAL

## 2022-06-20 VITALS
RESPIRATION RATE: 15 BRPM | WEIGHT: 169.8 LBS | SYSTOLIC BLOOD PRESSURE: 128 MMHG | HEIGHT: 67 IN | DIASTOLIC BLOOD PRESSURE: 81 MMHG | OXYGEN SATURATION: 98 % | TEMPERATURE: 98.2 F | BODY MASS INDEX: 26.65 KG/M2 | HEART RATE: 90 BPM

## 2022-06-20 DIAGNOSIS — J45.20 ASTHMA, MILD INTERMITTENT, WELL-CONTROLLED: Primary | ICD-10-CM

## 2022-06-20 PROCEDURE — 99213 OFFICE O/P EST LOW 20 MIN: CPT | Performed by: NURSE PRACTITIONER

## 2022-06-20 ASSESSMENT — ASTHMA QUESTIONNAIRES: ACT_TOTALSCORE: 25

## 2022-06-20 NOTE — PROGRESS NOTES
Today's Date: Jun 20, 2022     Patient Laci Dsouza 1990 presents to the clinic today to address   Chief Complaint   Patient presents with     Recheck Medication     Two inhaler refills, sent in already but it is the yearly check, hasn't had any problems              SUBJECTIVE     History of Present Illness:    31 year old male presents for asthma control test.  Patient currently endorses that his asthma is very well-controlled with infrequent use of his JACINDA and no nighttime awakenings. He does admit that he has not been exercising as frequently as he should since the COVID pandemic. Patient works remotely which contributes to sedentary lifestyle. He currently denies frequent cough, SOB, chest pain. He denies other acute concerns/symptoms at time of exam.     Review of Systems   Constitutional, HEENT, cardiovascular, pulmonary, gi and gu systems are negative, except as otherwise noted.        Allergies   Allergen Reactions     Ceclor [Cefaclor Monohydrate]      Unknown       Sulfa Drugs      Unknown          Current Outpatient Medications   Medication Instructions     albuterol (PROAIR RESPICLICK) 108 (90 Base) MCG/ACT inhaler 2 puffs, Inhalation, EVERY 4 HOURS PRN     fludrocortisone (FLORINEF) 0.1 mg, Oral, DAILY     hydrocortisone (CORTEF) 10 MG tablet TAKE 2 TABS BY MOUTH IN IN THE MORNING AND 1 TAB IN AFTERNOON/EVENING     hydrocortisone sodium succinate PF (SOLU-CORTEF) 100 mg, Intramuscular, ONCE, Dispense as Act-O-Vial     mirtazapine (REMERON) 7.5 mg, Oral, AT BEDTIME     SYMBICORT 160-4.5 MCG/ACT Inhaler 2 puffs, Inhalation, 2 TIMES DAILY     Syringe Luer Lock 100 mg, Does not apply, PRN     venlafaxine (EFFEXOR XR) 150 mg, Oral, DAILY, Total daily dose 225 mg.     ziprasidone (GEODON) 20 mg, Oral, DAILY       Past Medical History:   Diagnosis Date     Anxiety      Aortic atresia or stenosis, congenital 12/2001    Repair 2001 after had MI;     Asthma, moderate persistent, well-controlled       "Congenital adrenal hyperplasia (H) 1989    Dx at 1 mo of age; mother with CAH also (?)        Family History   Adopted: Yes   Problem Relation Age of Onset     Bipolar Disorder Father         Social History     Tobacco Use     Smoking status: Former Smoker     Types: Cigarettes     Smokeless tobacco: Never Used   Substance Use Topics     Alcohol use: Yes     Drug use: Never        History   Sexual Activity     Sexual activity: Yes     Partners: Male        PHQ 11/19/2020 11/24/2020 12/20/2021   PHQ-9 Total Score 5 3 12   Q9: Thoughts of better off dead/self-harm past 2 weeks Not at all Not at all Not at all        Immunization History   Administered Date(s) Administered     COVID-19,PF,Dipesh 03/13/2021     COVID-19,PF,Pfizer (12+ Yrs) 10/22/2021      Asthma Control test- 25. No ER visits or hospitalizations over past 12 months for asthma exacerbation.             OBJECTIVE     /81 (BP Location: Right arm, Patient Position: Sitting, Cuff Size: Adult Regular)   Pulse 90   Temp 98.2  F (36.8  C) (Oral)   Resp 15   Ht 1.702 m (5' 7\")   Wt 77 kg (169 lb 12.8 oz)   SpO2 98%   BMI 26.59 kg/m       Labs:  Lab Results   Component Value Date    WBC 6.6 12/22/2021    HGB 13.9 12/22/2021    HCT 41.8 12/22/2021     12/22/2021    ALT 30 12/13/2021    AST 11 12/13/2021     12/13/2021    BUN 12 12/13/2021    CO2 28 12/13/2021    TSH 0.29 (L) 12/22/2021    PSA 0.44 12/02/2019        Physical Exam  Vitals reviewed.   Constitutional:       General: He is not in acute distress.     Appearance: He is not ill-appearing.   HENT:      Head: Normocephalic.      Right Ear: Tympanic membrane normal.      Left Ear: Tympanic membrane normal.      Nose: Nose normal.      Mouth/Throat:      Mouth: Mucous membranes are moist.      Pharynx: No oropharyngeal exudate or posterior oropharyngeal erythema.   Eyes:      Extraocular Movements: Extraocular movements intact.      Pupils: Pupils are equal, round, and reactive to " light.   Cardiovascular:      Rate and Rhythm: Normal rate and regular rhythm.      Heart sounds: Normal heart sounds. No murmur heard.    No friction rub. No gallop.   Pulmonary:      Effort: Pulmonary effort is normal. No respiratory distress.      Breath sounds: Normal breath sounds. No wheezing, rhonchi or rales.   Musculoskeletal:      Cervical back: Neck supple.   Lymphadenopathy:      Cervical: No cervical adenopathy.   Neurological:      Mental Status: He is alert.   Psychiatric:         Thought Content: Thought content normal.         Judgment: Judgment normal.               ASSESSMENT/PLAN     1. Asthma, mild intermittent, well-controlled  - Asthma currently quiescent, physical exam unremarkable. Advised patient to gradually increase exercise and use 2 puffs of JACINDA prior to exercise. Asthma action planned completed for patient.      Follow-Up:  - Follow up in 6 month(s), or if symptoms worsen or fail to improve.     Options for treatment and follow-up care were reviewed with the patient. Patient engaged in the decision making process and verbalized understanding of the options discussed and agreed with the final plan.  AVS printed and given to patient.    PREM Harmon [facility]  [address] [place] [id number]  [phone]

## 2022-06-20 NOTE — LETTER
My Asthma Action Plan    Name: Laci Dsouza   YOB: 1990  Date: 6/20/2022   My doctor: PREM Harmon CNP   My clinic: [facility] [place]        My Rescue Medicine:   Albuterol inhaler (Proair/Ventolin/Proventil HFA)  2-4 puffs EVERY 4 HOURS as needed. Use a spacer if recommended by your provider.   My Asthma Severity:   Intermittent / Exercise Induced  Know your asthma triggers: pollens and exercise or sports  None          GREEN ZONE   Good Control    I feel good    No cough or wheeze    Can work, sleep and play without asthma symptoms       Take your asthma control medicine every day.     1. If exercise triggers your asthma, take your rescue medication    15 minutes before exercise or sports, and    During exercise if you have asthma symptoms  2. Spacer to use with inhaler: If you have a spacer, make sure to use it with your inhaler             YELLOW ZONE Getting Worse  I have ANY of these:    I do not feel good    Cough or wheeze    Chest feels tight    Wake up at night   1. Keep taking your Green Zone medications  2. Start taking your rescue medicine:    every 20 minutes for up to 1 hour. Then every 4 hours for 24-48 hours.  3. If you stay in the Yellow Zone for more than 12-24 hours, contact your doctor.  4. If you do not return to the Green Zone in 12-24 hours or you get worse, start taking your oral steroid medicine if prescribed by your provider.           RED ZONE Medical Alert - Get Help  I have ANY of these:    I feel awful    Medicine is not helping    Breathing getting harder    Trouble walking or talking    Nose opens wide to breathe       1. Take your rescue medicine NOW  2. If your provider has prescribed an oral steroid medicine, start taking it NOW  3. Call your doctor NOW  4. If you are still in the Red Zone after 20 minutes and you have not reached your doctor:    Take your rescue medicine again and    Call 911 or go to the emergency  room right away    See your regular doctor within 2 weeks of an Emergency Room or Urgent Care visit for follow-up treatment.          Annual Reminders:  Meet with Asthma Educator,  Flu Shot in the Fall, consider Pneumonia Vaccination for patients with asthma (aged 19 and older).    Pharmacy:    [facility]/PHARMACY #[id number] CLOSED - [place], [place] - [address] AT [facility]/PHARMACY #[id number] - [place], [place] - [address]    Electronically signed by PREM Harmon CNP   Date: 06/20/22                    Asthma Triggers  How To Control Things That Make Your Asthma Worse    Triggers are things that make your asthma worse.  Look at the list below to help you find your triggers and   what you can do about them. You can help prevent asthma flare-ups by staying away from your triggers.      Trigger                                                          What you can do   Cigarette Smoke  Tobacco smoke can make asthma worse. Do not allow smoking in your home, car or around you.  Be sure no one smokes at a child s day care or school.  If you smoke, ask your health care provider for ways to help you quit.  Ask family members to quit too.  Ask your health care provider for a referral to Quit Plan to help you quit smoking, or call 4-174-051-PLAN.     Colds, Flu, Bronchitis  These are common triggers of asthma. Wash your hands often.  Don t touch your eyes, nose or mouth.  Get a flu shot every year.     Dust Mites  These are tiny bugs that live in cloth or carpet. They are too small to see. Wash sheets and blankets in hot water every week.   Encase pillows and mattress in dust mite proof covers.  Avoid having carpet if you can. If you have carpet, vacuum weekly.   Use a dust mask and HEPA vacuum.   Pollen and Outdoor Mold  Some people are allergic to trees, grass, or weed pollen, or molds. Try to keep your windows closed.  Limit time out doors when pollen count is high.   Ask you health care provider  about taking medicine during allergy season.     Animal Dander  Some people are allergic to skin flakes, urine or saliva from pets with fur or feathers. Keep pets with fur or feathers out of your home.    If you can t keep the pet outdoors, then keep the pet out of your bedroom.  Keep the bedroom door closed.  Keep pets off cloth furniture and away from stuffed toys.     Mice, Rats, and Cockroaches  Some people are allergic to the waste from these pests.   Cover food and garbage.  Clean up spills and food crumbs.  Store grease in the refrigerator.   Keep food out of the bedroom.   Indoor Mold  This can be a trigger if your home has high moisture. Fix leaking faucets, pipes, or other sources of water.   Clean moldy surfaces.  Dehumidify basement if it is damp and smelly.   Smoke, Strong Odors, and Sprays  These can reduce air quality. Stay away from strong odors and sprays, such as perfume, powder, hair spray, paints, smoke incense, paint, cleaning products, candles and new carpet.   Exercise or Sports  Some people with asthma have this trigger. Be active!  Ask your doctor about taking medicine before sports or exercise to prevent symptoms.    Warm up for 5-10 minutes before and after sports or exercise.     Other Triggers of Asthma  Cold air:  Cover your nose and mouth with a scarf.  Sometimes laughing or crying can be a trigger.  Some medicines and food can trigger asthma.

## 2022-06-20 NOTE — NURSING NOTE
Chief Complaint   Patient presents with     Recheck Medication     Two inhaler refills, sent in already but it is the yearly check, hasn't had any problems

## 2022-07-28 ENCOUNTER — MYC REFILL (OUTPATIENT)
Dept: FAMILY MEDICINE | Facility: CLINIC | Age: 32
End: 2022-07-28

## 2022-07-28 DIAGNOSIS — J45.40 ASTHMA, MODERATE PERSISTENT, WELL-CONTROLLED: ICD-10-CM

## 2022-07-28 RX ORDER — BUDESONIDE AND FORMOTEROL FUMARATE DIHYDRATE 160; 4.5 UG/1; UG/1
2 AEROSOL RESPIRATORY (INHALATION) 2 TIMES DAILY
Qty: 10.2 G | Refills: 0 | Status: SHIPPED | OUTPATIENT
Start: 2022-07-28 | End: 2022-08-23

## 2022-07-28 RX ORDER — ALBUTEROL SULFATE 90 UG/1
2 POWDER, METERED RESPIRATORY (INHALATION) EVERY 4 HOURS PRN
Qty: 1 EACH | Refills: 0 | Status: SHIPPED | OUTPATIENT
Start: 2022-07-28 | End: 2023-09-20

## 2022-08-18 DIAGNOSIS — J45.40 ASTHMA, MODERATE PERSISTENT, WELL-CONTROLLED: ICD-10-CM

## 2022-08-18 RX ORDER — ALBUTEROL SULFATE 90 UG/1
2 POWDER, METERED RESPIRATORY (INHALATION) EVERY 4 HOURS PRN
Qty: 1 EACH | Refills: 0 | Status: CANCELLED | OUTPATIENT
Start: 2022-08-18

## 2022-08-23 RX ORDER — BUDESONIDE AND FORMOTEROL FUMARATE DIHYDRATE 160; 4.5 UG/1; UG/1
2 AEROSOL RESPIRATORY (INHALATION) 2 TIMES DAILY
Qty: 10.2 G | Refills: 2 | Status: SHIPPED | OUTPATIENT
Start: 2022-08-23 | End: 2022-12-09

## 2022-08-23 NOTE — TELEPHONE ENCOUNTER
SYMBICORT 160-4.5 MCG/ACT Inhaler  Last Written Prescription Date:   7/28/2022  Last Fill Quantity: 10.2,   # refills: 0  Last Office Visit :  6/20/2022  Future Office visit:  None   10.2 G, 2 Refills sent to pharm 8/23/2022      Deja Gomez RN  Central Triage Red Flags/Med Refills

## 2022-10-15 ENCOUNTER — HEALTH MAINTENANCE LETTER (OUTPATIENT)
Age: 32
End: 2022-10-15

## 2022-10-26 DIAGNOSIS — E25.0 CONGENITAL ADRENAL HYPERPLASIA DUE TO 21-HYDROXYLASE DEFICIENCY (21-OH CAH), SIMPLE VIRILIZING (H): ICD-10-CM

## 2022-10-29 RX ORDER — FLUDROCORTISONE ACETATE 0.1 MG/1
0.1 TABLET ORAL DAILY
Qty: 90 TABLET | Refills: 0 | Status: SHIPPED | OUTPATIENT
Start: 2022-10-29 | End: 2023-01-27

## 2022-10-29 NOTE — TELEPHONE ENCOUNTER
fludrocortisone (FLORINEF) 0.1 MG tablet  Last Written Prescription Date:  11/9/21  Last Fill Quantity: 90,   # refills: 3  Last Office Visit : 12/20/21  Future Office visit:  12/19/22

## 2022-12-07 DIAGNOSIS — J45.40 ASTHMA, MODERATE PERSISTENT, WELL-CONTROLLED: ICD-10-CM

## 2022-12-09 RX ORDER — BUDESONIDE AND FORMOTEROL FUMARATE DIHYDRATE 160; 4.5 UG/1; UG/1
2 AEROSOL RESPIRATORY (INHALATION) 2 TIMES DAILY
Qty: 10.2 G | Refills: 2 | Status: SHIPPED | OUTPATIENT
Start: 2022-12-09 | End: 2023-04-10

## 2023-01-02 DIAGNOSIS — E27.40 ADRENAL INSUFFICIENCY (H): ICD-10-CM

## 2023-01-03 RX ORDER — HYDROCORTISONE 10 MG/1
TABLET ORAL
Qty: 270 TABLET | Refills: 0 | Status: SHIPPED | OUTPATIENT
Start: 2023-01-03 | End: 2023-01-11

## 2023-01-03 NOTE — TELEPHONE ENCOUNTER
hydrocortisone (CORTEF) 10 MG tablet     Last Written Prescription Date:  11-  Last Fill Quantity: 270,   # refills: 3  Last Office Visit : 12-  Future Office visit:  08-    Routing refill request to provider for review/approval because:   failed protocol  Not seen within 12 months or next 30 days.    Scheduling has been notified to contact the pt for appointment.

## 2023-01-11 RX ORDER — HYDROCORTISONE 10 MG/1
TABLET ORAL
Qty: 270 TABLET | Refills: 2 | Status: SHIPPED | OUTPATIENT
Start: 2023-01-11 | End: 2023-10-18

## 2023-01-25 DIAGNOSIS — E25.0 CONGENITAL ADRENAL HYPERPLASIA DUE TO 21-HYDROXYLASE DEFICIENCY (21-OH CAH), SIMPLE VIRILIZING (H): ICD-10-CM

## 2023-01-27 RX ORDER — FLUDROCORTISONE ACETATE 0.1 MG/1
0.1 TABLET ORAL DAILY
Qty: 90 TABLET | Refills: 0 | Status: SHIPPED | OUTPATIENT
Start: 2023-01-27 | End: 2023-01-29

## 2023-01-27 NOTE — TELEPHONE ENCOUNTER
FLUDROCORTISONE 0.1 MG TABLET      Last Written Prescription Date:  10/29/22  Last Fill Quantity: 90,   # refills: 0  Last Office Visit : 12/20/21  Future Office visit:  8/21/23    Routing refill request to provider for review/approval because:  Was given 90d nell refill with instructions to keep appt on 12/19/22. Appt was cancelled, pt is not scheduled to be seen until 8/21/23. Should the patient be seen sooner?    90 day nell refill sent.

## 2023-01-29 DIAGNOSIS — E25.0 CONGENITAL ADRENAL HYPERPLASIA DUE TO 21-HYDROXYLASE DEFICIENCY (21-OH CAH), SIMPLE VIRILIZING (H): ICD-10-CM

## 2023-01-29 RX ORDER — FLUDROCORTISONE ACETATE 0.1 MG/1
0.1 TABLET ORAL DAILY
Qty: 90 TABLET | Refills: 1 | Status: SHIPPED | OUTPATIENT
Start: 2023-01-29 | End: 2023-10-18

## 2023-03-24 ENCOUNTER — TELEPHONE (OUTPATIENT)
Dept: ENDOCRINOLOGY | Facility: CLINIC | Age: 33
End: 2023-03-24
Payer: COMMERCIAL

## 2023-03-24 NOTE — TELEPHONE ENCOUNTER
Patient call:     Appointment type: return endo  Provider: Alana  Return date:reschedule 8/21 appt   Speciality phone number: [phone]  Additional appointment(s) needed:   Additional notes: LVM and sent MyC to reschedule 8/21 appt 3/24 GH

## 2023-04-06 DIAGNOSIS — J45.40 ASTHMA, MODERATE PERSISTENT, WELL-CONTROLLED: ICD-10-CM

## 2023-04-10 NOTE — TELEPHONE ENCOUNTER
SYMBICORT 160-4.5 MCG INHALER      Last Written Prescription Date:  12/9/22  Last Fill Quantity: 10.2g,   # refills: 2  Last Office Visit : 6/20/22  Future Office visit:  NONE    Routing refill request to provider for review/approval because:  Overdue for 6mo office visit and ACT  Already given 90d nell refill. No future appts scheduled.

## 2023-04-10 NOTE — TELEPHONE ENCOUNTER
Called patient and LVM. Will also send a App in the Air message.     Annia BUCKLEY EMT 1:30 PM 4/10/2023

## 2023-04-11 NOTE — TELEPHONE ENCOUNTER
Patient has appointment scheduled for 4/26/23 with Damon at 4:05  Annia BUCKLEY EMT 1:18 PM 4/11/2023

## 2023-04-13 RX ORDER — BUDESONIDE AND FORMOTEROL FUMARATE DIHYDRATE 160; 4.5 UG/1; UG/1
AEROSOL RESPIRATORY (INHALATION)
Qty: 10.2 G | Refills: 3 | Status: SHIPPED | OUTPATIENT
Start: 2023-04-13 | End: 2023-04-26

## 2023-04-25 ENCOUNTER — TELEPHONE (OUTPATIENT)
Dept: ENDOCRINOLOGY | Facility: CLINIC | Age: 33
End: 2023-04-25
Payer: COMMERCIAL

## 2023-04-25 NOTE — TELEPHONE ENCOUNTER
Hospital note sent to Dr. Warner as the patient's primary care provider, diagnosis depression. Patient last seen 12/20/21 with Dr. Warner. No future appointments. Writer called and left voice message for patient to return call regarding appointment. Please assist patient with return appointment if he would like to continue care with endocrine. Does patient have a PCP? Please assist patient with a PCP regarding non-endocrine, such as depression management.    GLADYS Roblero  Adult Endocrinology  [facility]

## 2023-04-26 ENCOUNTER — OFFICE VISIT (OUTPATIENT)
Dept: FAMILY MEDICINE | Facility: CLINIC | Age: 33
End: 2023-04-26
Payer: COMMERCIAL

## 2023-04-26 VITALS
HEIGHT: 67 IN | SYSTOLIC BLOOD PRESSURE: 132 MMHG | WEIGHT: 173 LBS | DIASTOLIC BLOOD PRESSURE: 75 MMHG | BODY MASS INDEX: 27.15 KG/M2 | TEMPERATURE: 97.8 F | OXYGEN SATURATION: 96 % | HEART RATE: 79 BPM

## 2023-04-26 DIAGNOSIS — J45.40 ASTHMA, MODERATE PERSISTENT, WELL-CONTROLLED: Primary | ICD-10-CM

## 2023-04-26 DIAGNOSIS — F32.A DEPRESSION, UNSPECIFIED DEPRESSION TYPE: ICD-10-CM

## 2023-04-26 DIAGNOSIS — J30.2 SEASONAL ALLERGIC RHINITIS, UNSPECIFIED TRIGGER: ICD-10-CM

## 2023-04-26 RX ORDER — LORATADINE 10 MG/1
10 TABLET ORAL DAILY
Qty: 30 TABLET | Refills: 1 | Status: SHIPPED | OUTPATIENT
Start: 2023-04-26 | End: 2023-05-15

## 2023-04-26 RX ORDER — VILAZODONE HYDROCHLORIDE 20 MG/1
20 TABLET ORAL DAILY
COMMUNITY
Start: 2023-03-10 | End: 2023-09-20

## 2023-04-26 RX ORDER — BUDESONIDE AND FORMOTEROL FUMARATE DIHYDRATE 160; 4.5 UG/1; UG/1
2 AEROSOL RESPIRATORY (INHALATION) 2 TIMES DAILY
Qty: 10.2 G | Refills: 3 | Status: SHIPPED | OUTPATIENT
Start: 2023-04-26 | End: 2023-12-14

## 2023-04-26 ASSESSMENT — ANXIETY QUESTIONNAIRES
IF YOU CHECKED OFF ANY PROBLEMS ON THIS QUESTIONNAIRE, HOW DIFFICULT HAVE THESE PROBLEMS MADE IT FOR YOU TO DO YOUR WORK, TAKE CARE OF THINGS AT HOME, OR GET ALONG WITH OTHER PEOPLE: SOMEWHAT DIFFICULT
GAD7 TOTAL SCORE: 7
5. BEING SO RESTLESS THAT IT IS HARD TO SIT STILL: NOT AT ALL
1. FEELING NERVOUS, ANXIOUS, OR ON EDGE: MORE THAN HALF THE DAYS
7. FEELING AFRAID AS IF SOMETHING AWFUL MIGHT HAPPEN: NOT AT ALL
2. NOT BEING ABLE TO STOP OR CONTROL WORRYING: SEVERAL DAYS
8. IF YOU CHECKED OFF ANY PROBLEMS, HOW DIFFICULT HAVE THESE MADE IT FOR YOU TO DO YOUR WORK, TAKE CARE OF THINGS AT HOME, OR GET ALONG WITH OTHER PEOPLE?: SOMEWHAT DIFFICULT
6. BECOMING EASILY ANNOYED OR IRRITABLE: SEVERAL DAYS
4. TROUBLE RELAXING: SEVERAL DAYS
GAD7 TOTAL SCORE: 7
GAD7 TOTAL SCORE: 7
7. FEELING AFRAID AS IF SOMETHING AWFUL MIGHT HAPPEN: NOT AT ALL
3. WORRYING TOO MUCH ABOUT DIFFERENT THINGS: MORE THAN HALF THE DAYS

## 2023-04-26 ASSESSMENT — ASTHMA QUESTIONNAIRES
QUESTION_1 LAST FOUR WEEKS HOW MUCH OF THE TIME DID YOUR ASTHMA KEEP YOU FROM GETTING AS MUCH DONE AT WORK, SCHOOL OR AT HOME: NONE OF THE TIME
QUESTION_3 LAST FOUR WEEKS HOW OFTEN DID YOUR ASTHMA SYMPTOMS (WHEEZING, COUGHING, SHORTNESS OF BREATH, CHEST TIGHTNESS OR PAIN) WAKE YOU UP AT NIGHT OR EARLIER THAN USUAL IN THE MORNING: NOT AT ALL
ACT_TOTALSCORE: 25
QUESTION_2 LAST FOUR WEEKS HOW OFTEN HAVE YOU HAD SHORTNESS OF BREATH: NOT AT ALL
ACT_TOTALSCORE: 25
QUESTION_4 LAST FOUR WEEKS HOW OFTEN HAVE YOU USED YOUR RESCUE INHALER OR NEBULIZER MEDICATION (SUCH AS ALBUTEROL): NOT AT ALL
QUESTION_5 LAST FOUR WEEKS HOW WOULD YOU RATE YOUR ASTHMA CONTROL: COMPLETELY CONTROLLED

## 2023-04-26 ASSESSMENT — PATIENT HEALTH QUESTIONNAIRE - PHQ9
SUM OF ALL RESPONSES TO PHQ QUESTIONS 1-9: 7
10. IF YOU CHECKED OFF ANY PROBLEMS, HOW DIFFICULT HAVE THESE PROBLEMS MADE IT FOR YOU TO DO YOUR WORK, TAKE CARE OF THINGS AT HOME, OR GET ALONG WITH OTHER PEOPLE: SOMEWHAT DIFFICULT
SUM OF ALL RESPONSES TO PHQ QUESTIONS 1-9: 7

## 2023-04-26 NOTE — TELEPHONE ENCOUNTER
Per [facility], patient has appointment in November with Dr. Warner.    Annika, [facility]  Adult Endocrinology  [facility]

## 2023-04-26 NOTE — NURSING NOTE
"ROOM:1  ROSARIO SHAVER    Preferred Name: Laci     How did you hear about us?  Current Patient    32 year old  Chief Complaint   Patient presents with     Asthma     Med Refill       Blood pressure 132/75, pulse 79, temperature 97.8  F (36.6  C), temperature source Oral, height 1.702 m (5' 7\"), weight 78.5 kg (173 lb), SpO2 96 %. Body mass index is 27.1 kg/m .  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), simple virilizing (H)     Congenital adrenal hyperplasia (H)     Anxiety     Aortic atresia or stenosis, congenital     Asthma, moderate persistent, well-controlled     Anal warts     Aortic stenosis     Asthma     Condylomata brien of perianal skin     Generalized anxiety disorder     Hepatitis B immune     Hyponatremia     Major depression       Wt Readings from Last 2 Encounters:   04/26/23 78.5 kg (173 lb)   06/20/22 77 kg (169 lb 12.8 oz)     BP Readings from Last 3 Encounters:   04/26/23 132/75   06/20/22 128/81   12/20/21 (!) 144/72       Allergies   Allergen Reactions     Ceclor [Cefaclor Monohydrate]      Unknown       Sulfa Antibiotics      Unknown         Current Outpatient Medications   Medication     albuterol (PROAIR RESPICLICK) 108 (90 Base) MCG/ACT inhaler     fludrocortisone (FLORINEF) 0.1 MG tablet     hydrocortisone (CORTEF) 10 MG tablet     hydrocortisone sodium succinate PF (SOLU-CORTEF) 100 MG injection     mirtazapine (REMERON) 15 MG tablet     SYMBICORT 160-4.5 MCG/ACT Inhaler     Syringe/Needle, Disp, (SYRINGE LUER LOCK) 23G X 1\" 3 ML MISC     vilazodone (VIIBRYD) 20 MG TABS tablet     ziprasidone (GEODON) 20 MG capsule     venlafaxine (EFFEXOR-XR) 150 MG 24 hr capsule     No current facility-administered medications for this visit.       Social History     Tobacco Use     Smoking status: Former     Types: Cigarettes     Smokeless tobacco: Never   Substance Use Topics     Alcohol " use: Yes     Drug use: Never       Honoring Choices - Health Care Directive Guide offered to patient at time of visit.    Health Maintenance Due   Topic Date Due     ADVANCE CARE PLANNING  Never done     DEPRESSION ACTION PLAN  Never done     HEPATITIS C SCREENING  Never done     Pneumococcal Vaccine: Pediatrics (0 to 5 Years) and At-Risk Patients (6 to 64 Years) (2 - PCV) 01/15/2010     YEARLY PREVENTIVE VISIT  12/24/2020       Immunization History   Administered Date(s) Administered     COVID-19 Vaccine (Dipesh) 03/13/2021     COVID-19 Vaccine 12+ (Pfizer) 10/22/2021     COVID-19 Vaccine Bivalent Booster 18+ (Moderna) 11/16/2022       No results found for: PAP    Recent Labs   Lab Test 12/22/21  0805 12/13/21  1733 11/19/20  1610 12/26/19  0727   ALT  --  30  --  34   CR  --  0.70 0.76 0.71   GFRESTIMATED  --  >90 >90 >90   GFRESTBLACK  --   --  >90 >90   ALBUMIN  --  3.8  --  3.8   POTASSIUM  --  3.6 3.7 4.2   TSH 0.29*  --   --   --            6/20/2022     8:44 AM 12/20/2021    11:07 AM   PHQ-2 ( 1999 Pfizer)   Q1: Little interest or pleasure in doing things 1 3   Q2: Feeling down, depressed or hopeless 1 2   PHQ-2 Score 2 5           11/19/2020     3:03 PM 11/24/2020     9:43 AM 12/20/2021    11:07 AM 4/26/2023     3:56 PM   PHQ-9 SCORE   PHQ-9 Total Score MyChart    7 (Mild depression)   PHQ-9 Total Score 5 3 12 7           6/22/2020     9:11 AM 11/18/2020     9:33 AM 4/26/2023     3:56 PM   KENNY-7 SCORE   Total Score 12 (moderate anxiety) 15 (severe anxiety) 7 (mild anxiety)   Total Score 12 15 7           11/19/2020     3:25 PM 6/20/2022     8:43 AM 4/26/2023     3:57 PM   ACT Total Scores   ACT TOTAL SCORE (Goal Greater than or Equal to 20) 24 25 25   In the past 12 months, how many times did you visit the emergency room for your asthma without being admitted to the hospital?  0 0   In the past 12 months, how many times were you hospitalized overnight because of your asthma?  0 0       Rossana Colón,  CMA    April 26, 2023 4:31 PM

## 2023-04-26 NOTE — PROGRESS NOTES
Today's Date: Apr 26, 2023     Patient Laci Dsouza 1990 presents to the clinic today to address: inhalers.          SUBJECTIVE     History of Present Illness:      32-year-old male PMH of congential adrenal hyperplasia, Asthma, Anxiety, and aortic atresia s/p repair 2001 following MI presents for asthma inhaler refills. His ACT score is 25 today. He is currently on symbicort BID and albuterol prn. He typically uses the albuterol 3x/week in the morning before he exercises. (He exercises at 5:30am, so he takes the symbicort and albuterol at the same time prior to his workout.) He denies activity intolerance and nighttime awakenings. He has not used his albuterol besides in the morning prior to his workouts. He endorses a history of seasonal allergies but reports that his symptoms are quiescent. He currently denies fatigue, SOB, CP.     Of note, patient was recently hospitalized at [facility] program from 4/3/23-4/21/23 for major depression, generalized anxiety, and cannabis use disorder. He was discharged on viibryd 20mg every day, Remeron 30mg at bedtime, and geodon 20mg nightly after supper. He reports improvement in his symptoms (current PHQ9-7 without thoughts of being better off dead or self-harm). He follows the [facility] for his Psych care. 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 No other acute concerns/symptoms at time of exam.        Review of Systems   Constitutional, HEENT, cardiovascular, pulmonary, gi and gu systems are negative, except as otherwise noted.          Allergies   Allergen Reactions     Ceclor [Cefaclor Monohydrate]      Unknown       Sulfa Antibiotics      Unknown          Current Outpatient Medications   Medication Instructions     albuterol (PROAIR RESPICLICK) 108 (90 Base) MCG/ACT inhaler 2 puffs, Inhalation, EVERY 4  HOURS PRN     fludrocortisone (FLORINEF) 0.1 mg, Oral, DAILY     hydrocortisone (CORTEF) 10 MG tablet TAKE 2 TABS BY MOUTH IN IN THE MORNING AND 1 TAB IN AFTERNOON/EVENING     hydrocortisone sodium succinate PF (SOLU-CORTEF) 100 mg, Intramuscular, ONCE, Dispense as Act-O-Vial     mirtazapine (REMERON) 7.5 mg, Oral, AT BEDTIME     SYMBICORT 160-4.5 MCG/ACT Inhaler INHALE 2 PUFFS INTO THE LUNGS 2 TIMES DAILY *SCHEDULE A FOLLOW-UP APPOINTMENT*     Syringe Luer Lock 100 mg, Does not apply, PRN     venlafaxine (EFFEXOR XR) 150 mg, Oral, DAILY, Total daily dose 225 mg.     ziprasidone (GEODON) 20 mg, Oral, DAILY       Past Medical History:   Diagnosis Date     Anxiety      Aortic atresia or stenosis, congenital 12/2001    Repair 2001 after had MI;     Asthma, moderate persistent, well-controlled      Congenital adrenal hyperplasia (H) 1989    Dx at 1 mo of age; mother with CAH also (?)        Family History   Adopted: Yes   Problem Relation Age of Onset     Bipolar Disorder Father         Social History     Tobacco Use     Smoking status: Former     Types: Cigarettes     Smokeless tobacco: Never   Substance Use Topics     Alcohol use: Yes     Drug use: Never        History   Sexual Activity     Sexual activity: Yes     Partners: Male            11/24/2020     9:43 AM 12/20/2021    11:07 AM 4/26/2023     3:56 PM   PHQ   PHQ-9 Total Score 3 12 7   Q9: Thoughts of better off dead/self-harm past 2 weeks Not at all Not at all Not at all           6/22/2020     9:11 AM 11/18/2020     9:33 AM 4/26/2023     3:56 PM   KENNY-7 SCORE   Total Score 12 (moderate anxiety) 15 (severe anxiety) 7 (mild anxiety)   Total Score 12 15 7           Immunization History   Administered Date(s) Administered     COVID-19 Vaccine (Dipesh) 03/13/2021     COVID-19 Vaccine 12+ (Pfizer) 10/22/2021     COVID-19 Vaccine Bivalent Booster 18+ (Moderna) 11/16/2022               OBJECTIVE     /75   Pulse 79   Temp 97.8  F (36.6  C) (Oral)   Ht 1.702 m  "(5' 7\")   Wt 78.5 kg (173 lb)   SpO2 96%   BMI 27.10 kg/m        Labs:  Lab Results   Component Value Date    WBC 6.6 12/22/2021    HGB 13.9 12/22/2021    HCT 41.8 12/22/2021     12/22/2021    ALT 30 12/13/2021    AST 11 12/13/2021     12/13/2021    BUN 12 12/13/2021    CO2 28 12/13/2021    TSH 0.29 (L) 12/22/2021    PSA 0.44 12/02/2019        Physical Exam  Constitutional:       General: He is not in acute distress.     Appearance: He is not ill-appearing.   Cardiovascular:      Rate and Rhythm: Normal rate and regular rhythm.      Heart sounds: Normal heart sounds. No murmur heard.     No gallop.   Pulmonary:      Effort: Pulmonary effort is normal. No respiratory distress.      Breath sounds: Examination of the left-lower field reveals rales. Rales present. No wheezing.      Comments: Patient initially had fine rales in LLL that cleared with cough.  Musculoskeletal:      Cervical back: Neck supple.      Right lower leg: No edema.      Left lower leg: No edema.   Lymphadenopathy:      Cervical: No cervical adenopathy.   Neurological:      General: No focal deficit present.      Mental Status: He is alert.   Psychiatric:         Thought Content: Thought content normal.         Judgment: Judgment normal.               ASSESSMENT/PLAN     1. Asthma, moderate persistent, well-controlled  VSS, pt in NAD. ACT score 25 today. Patient is only using albuterol prior to his am workouts. Advised him to initiate trial of Symbicort SMART therapy. If he notices his asthma symptoms worsen/increase without albuterol prior to his workout, then we will gladly restart albuterol.  - SYMBICORT 160-4.5 MCG/ACT Inhaler; Inhale 2 puffs into the lungs 2 times daily and 1 to 2 inhalations with spacer, as needed, every 4 hours; for persistent symptoms, may administer up to 2 inhalations every 20 minutes for 3 doses. Maximum dose: 12 inhalations/day  Dispense: 10.2 g; Refill: 3    2. Depression, unspecified depression " type  Patient s/p hospitalization for major depression, generalized anxiety, and cannabis use disorder. He reports improvement in symptoms. Will defer medication management to psych. Instructed patient to complete the following labs in 3 months given current antipsychotic regimen. Disposition pending results.    - Hemoglobin A1c; Future  - Lipid panel reflex to direct LDL Fasting; Future  - Basic metabolic panel; Future  - CBC with platelets differential; Future    3. Seasonal allergic rhinitis, unspecified trigger  Currently quiescent. Advised pt to start antihistamine considering MN's imminent spring bloom. He reports that he responds well to loratadine, Rx sent.  - loratadine (CLARITIN) 10 MG tablet; Take 1 tablet (10 mg) by mouth daily  Dispense: 30 tablet; Refill: 1    Follow-Up:  - Follow up in 6 month(s) for annual physical, or sooner if symptoms worsen or fail to improve.     Options for treatment and follow-up care were reviewed with the patient. Patient engaged in the decision making process and verbalized understanding of the options discussed and agreed with the final plan.  AVS printed and given to patient.    [facility]  [facility]  [address] [place] [id number]  [phone]      Answers for HPI/ROS submitted by the patient on 4/26/2023  If you checked off any problems, how difficult have these problems made it for you to do your work, take care of things at home, or get along with other people?: Somewhat difficult  PHQ9 TOTAL SCORE: 7  KENNY 7 TOTAL SCORE: 7

## 2023-05-10 DIAGNOSIS — J30.2 SEASONAL ALLERGIC RHINITIS, UNSPECIFIED TRIGGER: ICD-10-CM

## 2023-05-15 RX ORDER — LORATADINE 10 MG/1
10 TABLET ORAL DAILY
Qty: 90 TABLET | Refills: 3 | Status: SHIPPED | OUTPATIENT
Start: 2023-05-15 | End: 2023-09-20

## 2023-06-01 ENCOUNTER — HEALTH MAINTENANCE LETTER (OUTPATIENT)
Age: 33
End: 2023-06-01

## 2023-09-20 ENCOUNTER — OFFICE VISIT (OUTPATIENT)
Dept: FAMILY MEDICINE | Facility: CLINIC | Age: 33
End: 2023-09-20
Payer: COMMERCIAL

## 2023-09-20 VITALS
WEIGHT: 163 LBS | OXYGEN SATURATION: 97 % | HEIGHT: 68 IN | TEMPERATURE: 98.3 F | SYSTOLIC BLOOD PRESSURE: 126 MMHG | BODY MASS INDEX: 24.71 KG/M2 | DIASTOLIC BLOOD PRESSURE: 74 MMHG | HEART RATE: 106 BPM

## 2023-09-20 DIAGNOSIS — R79.89 LOW TESTOSTERONE: ICD-10-CM

## 2023-09-20 DIAGNOSIS — F32.A FATIGUE DUE TO DEPRESSION: Primary | ICD-10-CM

## 2023-09-20 DIAGNOSIS — Z23 ENCOUNTER FOR IMMUNIZATION: ICD-10-CM

## 2023-09-20 DIAGNOSIS — R53.83 FATIGUE DUE TO DEPRESSION: Primary | ICD-10-CM

## 2023-09-20 DIAGNOSIS — B35.4 TINEA CORPORIS: ICD-10-CM

## 2023-09-20 RX ORDER — KETOCONAZOLE 20 MG/G
CREAM TOPICAL DAILY
Qty: 60 G | Refills: 0 | Status: SHIPPED | OUTPATIENT
Start: 2023-09-20 | End: 2024-02-29

## 2023-09-20 RX ORDER — LAMOTRIGINE 100 MG/1
100 TABLET ORAL DAILY
COMMUNITY

## 2023-09-20 RX ORDER — BUPROPION HYDROCHLORIDE 150 MG/1
150 TABLET ORAL EVERY MORNING
COMMUNITY

## 2023-09-20 RX ORDER — KETOCONAZOLE 20 MG/ML
SHAMPOO TOPICAL DAILY PRN
Status: CANCELLED | OUTPATIENT
Start: 2023-09-20

## 2023-09-20 ASSESSMENT — ENCOUNTER SYMPTOMS
CONSTIPATION: 0
DYSPHORIC MOOD: 1
FATIGUE: 1

## 2023-09-20 NOTE — PROGRESS NOTES
Today's Date: Sep 20, 2023     Patient Laci Dsouza 1990 presents to the clinic today to address   Chief Complaint   Patient presents with    Derm Problem     Few months, not painful, but itchy at times mostly after showering   Has noticed that its spreading, under rt foot and sometime on forehead              SUBJECTIVE     History of Present Illness:    33-year-old male PMH of congential adrenal hyperplasia, Asthma, Anxiety, and aortic atresia s/p repair 2001 following MI presents to discuss a few concerns.    Rash-patient reports rash to central torso for several months.  The rash started in the center and has gradually moved superiorly.  He denies pain with the rash but reports that it's sometimes itchy especially after showering.    Fatigue/MDD-unfortunately, patient is still struggling with depression.  He currently follows the [facility] clinic with whom he has appointments 1-2 times per week.  One of the manifestations of his depression is fatigue.  He reports that he may shower and do some dishes and chores around the house but otherwise spends most of his time in bed as he does not have any energy to do anything.  His mental health providers suspect that this is secondary to depression, however, they advised that he discuss this with his primary care to rule out other causes. He currently denies suicidal ideation.  No other acute concerns/symptoms at time of exam.      Review of Systems   Constitutional: Positive for fatigue.   Cardiovascular: Negative for peripheral edema.   Gastrointestinal: Negative for constipation.   Psychiatric/Behavioral: Positive for dysphoric mood.   Constitutional, HEENT, cardiovascular, pulmonary, gi and gu systems are negative, except as otherwise noted.      Allergies   Allergen Reactions    Ceclor [Cefaclor Monohydrate]      Unknown      Sulfa Antibiotics      Unknown          Current Outpatient Medications   Medication    buPROPion (WELLBUTRIN XL) 150 MG 24 hr tablet     "fludrocortisone (FLORINEF) 0.1 MG tablet    hydrocortisone (CORTEF) 10 MG tablet        lamoTRIgine (LAMICTAL) 100 MG tablet    SYMBICORT 160-4.5 MCG/ACT Inhaler    Syringe/Needle, Disp, (SYRINGE LUER LOCK) 23G X 1\" 3 ML MISC    hydrocortisone sodium succinate PF (SOLU-CORTEF) 100 MG injection     No current facility-administered medications for this visit.         Past Medical History:   Diagnosis Date    Anxiety     Aortic atresia or stenosis, congenital 12/2001    Repair 2001 after had MI;    Asthma, moderate persistent, well-controlled     Congenital adrenal hyperplasia (H) 1989    Dx at 1 mo of age; mother with CAH also (?)        Family History   Adopted: Yes   Problem Relation Age of Onset    Bipolar Disorder Father         Social History     Tobacco Use    Smoking status: Former     Types: Cigarettes    Smokeless tobacco: Never   Substance Use Topics    Alcohol use: Yes    Drug use: Never        History   Sexual Activity    Sexual activity: Yes    Partners: Male            11/24/2020     9:43 AM 12/20/2021    11:07 AM 4/26/2023     3:56 PM   PHQ   PHQ-9 Total Score 3 12 7   Q9: Thoughts of better off dead/self-harm past 2 weeks Not at all Not at all Not at all        Immunization History   Administered Date(s) Administered    COVID-19 Bivalent 18+ (Moderna) 11/16/2022    COVID-19 MONOVALENT 12+ (Pfizer) 10/22/2021    COVID-19 Vaccine (Dipesh) 03/13/2021                 OBJECTIVE     /74   Pulse 106   Temp 98.3  F (36.8  C) (Oral)   Ht 1.717 m (5' 7.6\")   Wt 73.9 kg (163 lb)   SpO2 97%   BMI 25.08 kg/m       Labs:  Lab Results   Component Value Date    WBC 6.6 12/22/2021    HGB 13.9 12/22/2021    HCT 41.8 12/22/2021     12/22/2021    ALT 30 12/13/2021    AST 11 12/13/2021     12/13/2021    BUN 12 12/13/2021    CO2 28 12/13/2021    TSH 0.29 (L) 12/22/2021    PSA 0.44 12/02/2019        Physical Exam  Constitutional:       General: He is not in acute distress.     Appearance: He is not " ill-appearing.   Eyes:      Extraocular Movements: Extraocular movements intact.   Cardiovascular:      Rate and Rhythm: Regular rhythm. Tachycardia present.      Heart sounds: Normal heart sounds. No murmur heard.  Pulmonary:      Effort: Pulmonary effort is normal. No respiratory distress.      Breath sounds: Normal breath sounds. No wheezing or rales.   Abdominal:      General: Bowel sounds are normal.      Palpations: Abdomen is soft.      Tenderness: There is no abdominal tenderness.   Musculoskeletal:      Cervical back: Neck supple.      Right lower leg: No edema.      Left lower leg: No edema.   Lymphadenopathy:      Cervical: No cervical adenopathy.   Skin:     Findings: Rash present. No abscess or erythema.          Neurological:      General: No focal deficit present.      Mental Status: He is alert.   Psychiatric:         Thought Content: Thought content normal.         Judgment: Judgment normal.               ASSESSMENT/PLAN     1. Fatigue due to depression  Unfortunately, patient is still struggling with depressive symptoms.  He currently denies SI and verbally contracted for safety today(he agreed to seek help and/or go to an ED for SI).  We will check labs as noted below to rule out an organic cause for his low energy/fatigue, though we did discuss that his symptoms are likely secondary to his depression.  We will defer depression management to his mental health providers.  - TSH with free T4 reflex; Future  - Testosterone total; Future  - CBC with platelets differential; Future  - Comprehensive metabolic panel; Future  - Hemoglobin A1c; Future    2. Low testosterone  Chart review demonstrates patient has a history of low testosterone in the past.  In light of this, we will check a morning testosterone level.  - Testosterone total; Future    3. Tinea corporis  We will start topical Azole.  Advised patient that resolution can take up to 2 to 3 weeks.  - ketoconazole (NIZORAL) 2 % external cream; Apply  topically daily X 2-3 weeks until clinical resolution  Dispense: 60 g; Refill: 0    4. Encounter for immunization  - INFLUENZA VACCINE >6 MONTHS (AFLURIA/FLUZONE)        Follow-Up:  - Follow up in 3 month(s) for annual physical, or sooner if symptoms worsen or fail to improve. Disposition pending results.      Options for treatment and follow-up care were reviewed with the patient. Patient engaged in the decision making process and verbalized understanding of the options discussed and agreed with the final plan.  AVS printed and given to patient.    [facility]  [facility]  [address] [place] [id number] [phone]        Note: Chart documentation was done in part with Dragon Voice Recognition software.  Although reviewed after completion, some word and grammatical errors may remain. Please contact author for any clarification or concerns.

## 2023-09-20 NOTE — NURSING NOTE
"ROOM:2  ROSARIO SHAVER    Preferred Name: Laci     How did you hear about us?  Current Patient    33 year old  Chief Complaint   Patient presents with     Derm Problem     Few months, not painful, but itchy at times mostly after showering   Has noticed that its spreading, under rt foot and sometime on forehead        Blood pressure 126/74, pulse 106, temperature 98.3  F (36.8  C), temperature source Oral, height 1.717 m (5' 7.6\"), weight 73.9 kg (163 lb), SpO2 97 %. Body mass index is 25.08 kg/m .  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), simple virilizing (H)     Congenital adrenal hyperplasia (H)     Anxiety     Aortic atresia or stenosis, congenital     Asthma, moderate persistent, well-controlled     Anal warts     Aortic stenosis     Asthma     Condylomata brien of perianal skin     Generalized anxiety disorder     Hepatitis B immune     Hyponatremia     Major depression       Wt Readings from Last 2 Encounters:   09/20/23 73.9 kg (163 lb)   04/26/23 78.5 kg (173 lb)     BP Readings from Last 3 Encounters:   09/20/23 126/74   04/26/23 132/75   06/20/22 128/81       Allergies   Allergen Reactions     Ceclor [Cefaclor Monohydrate]      Unknown       Sulfa Antibiotics      Unknown         Current Outpatient Medications   Medication     fludrocortisone (FLORINEF) 0.1 MG tablet     hydrocortisone (CORTEF) 10 MG tablet     SYMBICORT 160-4.5 MCG/ACT Inhaler     Syringe/Needle, Disp, (SYRINGE LUER LOCK) 23G X 1\" 3 ML MISC     albuterol (PROAIR RESPICLICK) 108 (90 Base) MCG/ACT inhaler     hydrocortisone sodium succinate PF (SOLU-CORTEF) 100 MG injection     loratadine (CLARITIN) 10 MG tablet     mirtazapine (REMERON) 15 MG tablet     vilazodone (VIIBRYD) 20 MG TABS tablet     ziprasidone (GEODON) 20 MG capsule     No current facility-administered medications for this visit.       Social History "     Tobacco Use     Smoking status: Former     Types: Cigarettes     Smokeless tobacco: Never   Substance Use Topics     Alcohol use: Yes     Drug use: Never       Honoring Choices - Health Care Directive Guide offered to patient at time of visit.    Health Maintenance Due   Topic Date Due     ADVANCE CARE PLANNING  Never done     DEPRESSION ACTION PLAN  Never done     HEPATITIS C SCREENING  Never done     HEPATITIS A IMMUNIZATION (1 of 2 - Risk 2-dose series) Never done     Pneumococcal Vaccine: Pediatrics (0 to 5 Years) and At-Risk Patients (6 to 64 Years) (2 - PCV) 01/15/2010     YEARLY PREVENTIVE VISIT  12/24/2020     ASTHMA ACTION PLAN  06/20/2023     INFLUENZA VACCINE (1) 09/01/2023       Immunization History   Administered Date(s) Administered     COVID-19 Bivalent 18+ (Moderna) 11/16/2022     COVID-19 MONOVALENT 12+ (Pfizer) 10/22/2021     COVID-19 Vaccine (Dipesh) 03/13/2021       No results found for: PAP    Recent Labs   Lab Test 12/22/21  0805 12/13/21  1733 11/19/20  1610 12/26/19  0727   ALT  --  30  --  34   CR  --  0.70 0.76 0.71   GFRESTIMATED  --  >90 >90 >90   GFRESTBLACK  --   --  >90 >90   ALBUMIN  --  3.8  --  3.8   POTASSIUM  --  3.6 3.7 4.2   TSH 0.29*  --   --   --            6/20/2022     8:44 AM 12/20/2021    11:07 AM   PHQ-2 ( 1999 Pfizer)   Q1: Little interest or pleasure in doing things 1 3   Q2: Feeling down, depressed or hopeless 1 2   PHQ-2 Score 2 5           11/19/2020     3:03 PM 11/24/2020     9:43 AM 12/20/2021    11:07 AM 4/26/2023     3:56 PM   PHQ-9 SCORE   PHQ-9 Total Score MyChart    7 (Mild depression)   PHQ-9 Total Score 5 3 12 7           6/22/2020     9:11 AM 11/18/2020     9:33 AM 4/26/2023     3:56 PM   KENNY-7 SCORE   Total Score 12 (moderate anxiety) 15 (severe anxiety) 7 (mild anxiety)   Total Score 12 15 7           11/19/2020     3:25 PM 6/20/2022     8:43 AM 4/26/2023     3:57 PM   ACT Total Scores   ACT TOTAL SCORE (Goal Greater than or Equal to 20) 24 25 25    In the past 12 months, how many times did you visit the emergency room for your asthma without being admitted to the hospital?  0 0   In the past 12 months, how many times were you hospitalized overnight because of your asthma?  0 0       Hemant Bustamante    September 20, 2023 3:11 PM

## 2023-09-20 NOTE — PROGRESS NOTES
Prior to immunization administration, verified patients identity using patient s name and date of birth. Please see Immunization Activity for additional information.     Screening Questionnaire for Adult Immunization    Are you sick today?   No   Do you have allergies to medications, food, a vaccine component or latex?   Yes   Have you ever had a serious reaction after receiving a vaccination?   No   Do you have a long-term health problem with heart, lung, kidney, or metabolic disease (e.g., diabetes), asthma, a blood disorder, no spleen, complement component deficiency, a cochlear implant, or a spinal fluid leak?  Are you on long-term aspirin therapy?   No   Do you have cancer, leukemia, HIV/AIDS, or any other immune system problem?   No   Do you have a parent, brother, or sister with an immune system problem?   No   In the past 3 months, have you taken medications that affect  your immune system, such as prednisone, other steroids, or anticancer drugs; drugs for the treatment of rheumatoid arthritis, Crohn s disease, or psoriasis; or have you had radiation treatments?   No   Have you had a seizure, or a brain or other nervous system problem?   No   During the past year, have you received a transfusion of blood or blood    products, or been given immune (gamma) globulin or antiviral drug?   No   For women: Are you pregnant or is there a chance you could become       pregnant during the next month?   No   Have you received any vaccinations in the past 4 weeks?   No     Immunization questionnaire was positive for at least one answer.  Notified Chemo Waters CNP.      Patient instructed to remain in clinic for 15 minutes afterwards, and to report any adverse reactions.     Screening performed by ROSSY Alvarado on 9/20/2023 at 3:49 PM.

## 2023-09-22 ENCOUNTER — LAB (OUTPATIENT)
Dept: LAB | Facility: CLINIC | Age: 33
End: 2023-09-22
Payer: COMMERCIAL

## 2023-09-22 DIAGNOSIS — F32.A FATIGUE DUE TO DEPRESSION: ICD-10-CM

## 2023-09-22 DIAGNOSIS — R53.83 FATIGUE DUE TO DEPRESSION: ICD-10-CM

## 2023-09-22 DIAGNOSIS — R79.89 LOW TESTOSTERONE: ICD-10-CM

## 2023-09-22 LAB
ALBUMIN SERPL BCG-MCNC: 4.6 G/DL (ref 3.5–5.2)
ALP SERPL-CCNC: 64 U/L (ref 40–129)
ALT SERPL W P-5'-P-CCNC: 8 U/L (ref 0–70)
ANION GAP SERPL CALCULATED.3IONS-SCNC: 12 MMOL/L (ref 7–15)
AST SERPL W P-5'-P-CCNC: 15 U/L (ref 0–45)
BASOPHILS # BLD AUTO: 0.1 10E3/UL (ref 0–0.2)
BASOPHILS NFR BLD AUTO: 1 %
BILIRUB SERPL-MCNC: 0.3 MG/DL
BUN SERPL-MCNC: 13.3 MG/DL (ref 6–20)
CALCIUM SERPL-MCNC: 9.3 MG/DL (ref 8.6–10)
CHLORIDE SERPL-SCNC: 106 MMOL/L (ref 98–107)
CREAT SERPL-MCNC: 0.78 MG/DL (ref 0.67–1.17)
DEPRECATED HCO3 PLAS-SCNC: 23 MMOL/L (ref 22–29)
EGFRCR SERPLBLD CKD-EPI 2021: >90 ML/MIN/1.73M2
EOSINOPHIL # BLD AUTO: 0.2 10E3/UL (ref 0–0.7)
EOSINOPHIL NFR BLD AUTO: 3 %
ERYTHROCYTE [DISTWIDTH] IN BLOOD BY AUTOMATED COUNT: 13.6 % (ref 10–15)
GLUCOSE SERPL-MCNC: 79 MG/DL (ref 70–99)
HBA1C MFR BLD: 5.1 %
HCT VFR BLD AUTO: 41.6 % (ref 40–53)
HGB BLD-MCNC: 13.9 G/DL (ref 13.3–17.7)
IMM GRANULOCYTES # BLD: 0 10E3/UL
IMM GRANULOCYTES NFR BLD: 1 %
LYMPHOCYTES # BLD AUTO: 1.8 10E3/UL (ref 0.8–5.3)
LYMPHOCYTES NFR BLD AUTO: 24 %
MCH RBC QN AUTO: 28.5 PG (ref 26.5–33)
MCHC RBC AUTO-ENTMCNC: 33.4 G/DL (ref 31.5–36.5)
MCV RBC AUTO: 85 FL (ref 78–100)
MONOCYTES # BLD AUTO: 0.5 10E3/UL (ref 0–1.3)
MONOCYTES NFR BLD AUTO: 7 %
NEUTROPHILS # BLD AUTO: 4.8 10E3/UL (ref 1.6–8.3)
NEUTROPHILS NFR BLD AUTO: 64 %
NRBC # BLD AUTO: 0 10E3/UL
NRBC BLD AUTO-RTO: 0 /100
PLATELET # BLD AUTO: 318 10E3/UL (ref 150–450)
POTASSIUM SERPL-SCNC: 3.9 MMOL/L (ref 3.4–5.3)
PROT SERPL-MCNC: 7.1 G/DL (ref 6.4–8.3)
RBC # BLD AUTO: 4.88 10E6/UL (ref 4.4–5.9)
SODIUM SERPL-SCNC: 141 MMOL/L (ref 136–145)
TSH SERPL DL<=0.005 MIU/L-ACNC: 1.49 UIU/ML (ref 0.3–4.2)
WBC # BLD AUTO: 7.5 10E3/UL (ref 4–11)

## 2023-09-22 PROCEDURE — 84443 ASSAY THYROID STIM HORMONE: CPT | Mod: ORL | Performed by: NURSE PRACTITIONER

## 2023-09-22 PROCEDURE — 83036 HEMOGLOBIN GLYCOSYLATED A1C: CPT | Mod: ORL | Performed by: NURSE PRACTITIONER

## 2023-09-22 PROCEDURE — 85025 COMPLETE CBC W/AUTO DIFF WBC: CPT | Mod: ORL | Performed by: NURSE PRACTITIONER

## 2023-09-22 PROCEDURE — 80053 COMPREHEN METABOLIC PANEL: CPT | Mod: ORL | Performed by: NURSE PRACTITIONER

## 2023-09-22 PROCEDURE — 84403 ASSAY OF TOTAL TESTOSTERONE: CPT | Mod: ORL | Performed by: NURSE PRACTITIONER

## 2023-09-26 LAB — TESTOST SERPL-MCNC: 296 NG/DL (ref 240–950)

## 2023-10-17 DIAGNOSIS — E27.40 ADRENAL INSUFFICIENCY (H): ICD-10-CM

## 2023-10-17 DIAGNOSIS — E25.0 CONGENITAL ADRENAL HYPERPLASIA DUE TO 21-HYDROXYLASE DEFICIENCY (21-OH CAH), SIMPLE VIRILIZING (H): ICD-10-CM

## 2023-10-18 RX ORDER — FLUDROCORTISONE ACETATE 0.1 MG/1
0.1 TABLET ORAL DAILY
Qty: 30 TABLET | Refills: 0 | Status: SHIPPED | OUTPATIENT
Start: 2023-10-18 | End: 2023-10-30

## 2023-10-18 RX ORDER — HYDROCORTISONE 10 MG/1
TABLET ORAL
Qty: 90 TABLET | Refills: 0 | Status: SHIPPED | OUTPATIENT
Start: 2023-10-18 | End: 2023-10-30

## 2023-10-18 NOTE — TELEPHONE ENCOUNTER
fludrocortisone (FLORINEF) 0.1 MG tablet 90 tablet 1 1/29/2023     hydrocortisone (CORTEF) 10 MG tablet 270 tablet 2 1/11/2023     Last Office Visit: 12/20/21  Future Office visit:   11/20/23    Routing refill request to provider for review/approval because:  Was given 90d nell refill with instructions to keep appt on 12/19/22. Appt was cancelled, pt scheduled 8/21/23 . Cancelled that appointment also.    Monica Sandoval RN

## 2023-10-30 ENCOUNTER — MYC REFILL (OUTPATIENT)
Dept: ENDOCRINOLOGY | Facility: CLINIC | Age: 33
End: 2023-10-30
Payer: COMMERCIAL

## 2023-10-30 DIAGNOSIS — E27.40 ADRENAL INSUFFICIENCY (H): ICD-10-CM

## 2023-10-30 DIAGNOSIS — E25.0 CONGENITAL ADRENAL HYPERPLASIA DUE TO 21-HYDROXYLASE DEFICIENCY (21-OH CAH), SIMPLE VIRILIZING (H): ICD-10-CM

## 2023-10-30 RX ORDER — FLUDROCORTISONE ACETATE 0.1 MG/1
0.1 TABLET ORAL DAILY
Qty: 90 TABLET | Refills: 0 | Status: SHIPPED | OUTPATIENT
Start: 2023-10-30 | End: 2023-11-20

## 2023-10-30 RX ORDER — HYDROCORTISONE 10 MG/1
TABLET ORAL
Qty: 90 TABLET | Refills: 0 | Status: SHIPPED | OUTPATIENT
Start: 2023-10-30 | End: 2023-11-20

## 2023-11-20 ENCOUNTER — OFFICE VISIT (OUTPATIENT)
Dept: ENDOCRINOLOGY | Facility: CLINIC | Age: 33
End: 2023-11-20

## 2023-11-20 VITALS
HEART RATE: 89 BPM | DIASTOLIC BLOOD PRESSURE: 78 MMHG | WEIGHT: 159 LBS | OXYGEN SATURATION: 99 % | SYSTOLIC BLOOD PRESSURE: 138 MMHG | BODY MASS INDEX: 24.46 KG/M2

## 2023-11-20 DIAGNOSIS — E25.0 CONGENITAL ADRENAL HYPERPLASIA DUE TO 21-HYDROXYLASE DEFICIENCY (21-OH CAH), SIMPLE VIRILIZING (H): Primary | ICD-10-CM

## 2023-11-20 DIAGNOSIS — E27.40 ADRENAL INSUFFICIENCY (H): ICD-10-CM

## 2023-11-20 DIAGNOSIS — D35.00 ADRENAL REST TUMOR: ICD-10-CM

## 2023-11-20 PROCEDURE — 99214 OFFICE O/P EST MOD 30 MIN: CPT | Performed by: INTERNAL MEDICINE

## 2023-11-20 RX ORDER — FLUDROCORTISONE ACETATE 0.1 MG/1
0.1 TABLET ORAL DAILY
Qty: 90 TABLET | Refills: 3 | Status: SHIPPED | OUTPATIENT
Start: 2023-11-20 | End: 2024-01-02

## 2023-11-20 RX ORDER — HYDROCORTISONE 10 MG/1
TABLET ORAL
Qty: 300 TABLET | Refills: 3 | Status: SHIPPED | OUTPATIENT
Start: 2023-11-20 | End: 2023-12-26

## 2023-11-20 RX ORDER — SYRINGE W-NEEDLE,DISPOSAB,3 ML 23GX1"
100 SYRINGE, EMPTY DISPOSABLE MISCELLANEOUS PRN
Qty: 3 EACH | Refills: 3 | Status: SHIPPED | OUTPATIENT
Start: 2023-11-20

## 2023-11-20 ASSESSMENT — PAIN SCALES - GENERAL: PAINLEVEL: NO PAIN (0)

## 2023-11-20 NOTE — LETTER
11/20/2023       RE: Laci Dsouza  [address]  [place] [address]     Dear Colleague,    Thank you for referring your patient, Laci Dsouza, to the [facility] [place] at [facility]. Please see a copy of my visit note below.    11/16/2023  PATIENT LAB/IMAGING STATUS : No pending lab orders  Leslye Lindo         =========================================================================================    Assessment:    1.  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  Recommendations:  Schedule morning labs, approximately 2 to 3 hours after taking the morning medications: Androstenedione, ACTH, cortisol, renin, BMP, free and total testosterone and DHEA-S.  Continue current dose of hydrocortisone and Florinef    2.  Adrenal rest tumors  Consider a follow-up ultrasound of the testis in 2024.     3.  Premature ejaculation  Discussed about the option of consulting urology.    Orders Placed This Encounter   Procedures    Luteinizing Hormone    Follicle stimulating hormone    Androstenedione    Cortisol    Renin activity    Adrenal corticotropin    DHEA sulfate    Testosterone Free and Total     =========================================================================================    Laci Dsouza is a 33 year old male with congenital adrenal hyperplasia, on hydrocortisone and Florinef therapy.  He was previously seen by Dr. Cordova and he established care with me in 2018. The patient was diagnosed at 1 month of age and has been on lifelong hormone replacement.  Current dose of hydrocortisone is 20 mg in the morning, when he wakes up, and 10 mg around 2 PM.  He takes the Florinef dose in the morning, 0.1 mg daily.     On the lab work from 12/13/2021, done at 5:30 PM, CMP was normal, renin was 3.7, ACTH was  15, androstendione was normal at 1284, cortisol level was 6.1 at 5:30 PM, DHEA-S was 99, both total and free testosterone were low (155 and 2.71, checked in the afternoon), LH was 3.4 and FSH was 2.8. Morning free testosterone was 3.16 in 12/21.     The testicular US from 12/2018 revealed irregular similar-appearing hypoechoic foci in the left and right testes, measuring 1.1 and 2.7 cm, suggestive of testicular adrenal rests. They remained stable on the f/up testicular US from 12/2019 and 12/13/2021.   A1c was 5.1%, TSH was normal at 1.49, on 9/22/23.     Today, the patient denies any specific complaints.  The depression significantly improved.  Denies experiencing episodes of sickness which required a higher dose of hydrocortisone since his last visit here.     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/2001    Repair 2001 after had MI;    Asthma, moderate persistent, well-controlled     Congenital adrenal hyperplasia (H) 1989    Dx at 1 mo of age; mother with CAH also (?)   Depression   No fractures  Asthma  Urinary urgency/retention -evaluated in the urology clinic in 2020  Tinea corporis     Past Surgical History   Past Surgical History:   Procedure Laterality Date    aortic stenosis s/p surgical repair including cor reconstruction 2001         Current Medications    Current Outpatient Medications:     buPROPion (WELLBUTRIN XL) 150 MG 24 hr tablet, Take 150 mg by mouth every morning, Disp: , Rfl:     fludrocortisone (FLORINEF) 0.1 MG tablet, Take 1 tablet (0.1 mg) by mouth daily, Disp: 90 tablet, Rfl: 0    hydrocortisone (CORTEF) 10 MG tablet, TAKE 2 TABLETS BY MOUTH IN THE MORNING AND 1 TABLET IN AFTERNOON/EVENING, Disp: 90 tablet, Rfl: 0    hydrocortisone sodium succinate PF (SOLU-CORTEF) 100 MG injection, Inject 2 mLs (100 mg) into the muscle once for 1 dose Dispense as Act-O-Vial, Disp: 2 mL, Rfl: 1    ketoconazole (NIZORAL) 2 %  "external cream, Apply topically daily X 2-3 weeks until clinical resolution, Disp: 60 g, Rfl: 0    lamoTRIgine (LAMICTAL) 100 MG tablet, Take 100 mg by mouth daily, Disp: , Rfl:     SYMBICORT 160-4.5 MCG/ACT Inhaler, Inhale 2 puffs into the lungs 2 times daily and 1 to 2 inhalations with spacer, as needed, every 4 hours; for persistent symptoms, may administer up to 2 inhalations every 20 minutes for 3 doses. Maximum dose: 12 inhalations/day, Disp: 10.2 g, Rfl: 3    Syringe/Needle, Disp, (SYRINGE LUER LOCK) 23G X 1\" 3 ML MISC, 100 mg as needed, Disp: 3 each, Rfl: 3     Family History   Mother - congenital adrenal hyperplasia. Father - CVA in his 60s.     Social History  Single. No children. Former smoker, 1/2 PPD for 10 years.  Denies using illicit drugs. 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     Review of Systems   Systemic:             Weight down 14 lbs in the last year   Eye:                      No eye symptoms   Lizeth-Laryngeal:     No lizeth-laryngeal symptoms, no dysphagia, no hoarseness, no cough     Breast:                  No breast symptoms  Cardiovascular:    No cardiovascular symptoms, no CP or palpitations   Pulmonary:           No pulmonary symptoms, no SOB or cough    Gastrointestinal:   No gastrointestinal symptoms, no diarrhea or constipation   Genitourinary:      Urinates ~1 times a night, 2-3 times a week   Endocrine:            No endocrine symptoms, no cold or heat intolerance; no ED; some difficulty with ejaculation     Neurological:        No headaches, no tremor, no numbness or tingling sensation, no dizziness   Musculoskeletal:  Lower back pain present intermittently   Skin:                     no dry skin, no hair falling out; no changes of the facial hair; no acne, no stretch marks; no easy bruising   Psychological:      Depression - longstanding and improving               Vital Signs     Previous Weights:    Wt Readings from Last 10 Encounters:   11/20/23 72.1 kg (159 " lb)   09/20/23 73.9 kg (163 lb)   04/26/23 78.5 kg (173 lb)   06/20/22 77 kg (169 lb 12.8 oz)   12/20/21 72.6 kg (160 lb)   01/22/20 61.2 kg (135 lb)   12/24/19 64.5 kg (142 lb 4.8 oz)   12/02/19 62.5 kg (137 lb 12.8 oz)   12/20/18 70.2 kg (154 lb 12.8 oz)   12/03/18 68.9 kg (152 lb)     BP Readings from Last 6 Encounters:   11/20/23 138/78   09/20/23 126/74   04/26/23 132/75   06/20/22 128/81   12/20/21 (!) 144/72   11/19/20 122/83        /78   Pulse 89   Wt 72.1 kg (159 lb)   SpO2 99%   BMI 24.46 kg/m      General appearance: he is well-developed, well-nourished, no distress noted   Eyes: conjunctivae and extraocular motions are normal. Pupils are equal, round, and reactive to light. No lid lag, no stare.  HENT: oropharynx clear and moist; neck no JVD, no bruits, no thyromegaly, no palpable nodules  Cardiovascular: regular rhythm, hyperkinetic, no murmurs, distal pulses palpable, no edema  Respiratory: chest clear, no rales, no rhonchi  Gastrointestinal: abdomen soft, nontender, nondistended, +BS, no organomegaly  Musculoskeletal: normal tone and strength   Neurologic: reflexes normal and symmetric, fine resting tremor of the outstretched hands  Psychiatric: affect and judgment normal   Skin: No stretch marks, no bruises, no hyperpigmentation    Lab Results  I reviewed prior lab results documented in Epic.  TSH   Date Value Ref Range Status   09/22/2023 1.49 0.30 - 4.20 uIU/mL Final   12/22/2021 0.29 (L) 0.40 - 4.00 mU/L Final   08/07/2008 0.34 mcU/mL Final       Deja Warner MD

## 2023-11-20 NOTE — NURSING NOTE
"Chief Complaint   Patient presents with    Endocrine Problem     Adrenal     Vital signs:      BP: 138/78 Pulse: 89     SpO2: 99 %       Weight: 72.1 kg (159 lb)  Estimated body mass index is 24.46 kg/m  as calculated from the following:    Height as of 9/20/23: 1.717 m (5' 7.6\").    Weight as of this encounter: 72.1 kg (159 lb).        "

## 2023-11-20 NOTE — PROGRESS NOTES
=========================================================================================    Assessment:    1.  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  Recommendations:  Schedule morning labs, approximately 2 to 3 hours after taking the morning medications: Androstenedione, ACTH, cortisol, renin, BMP, free and total testosterone and DHEA-S.  Continue current dose of hydrocortisone and Florinef    2.  Adrenal rest tumors  Consider a follow-up ultrasound of the testis in 2024.     3.  Premature ejaculation  Discussed about the option of consulting urology.    Orders Placed This Encounter   Procedures    Luteinizing Hormone    Follicle stimulating hormone    Androstenedione    Cortisol    Renin activity    Adrenal corticotropin    DHEA sulfate    Testosterone Free and Total     =========================================================================================    Laci Dsouza is a 33 year old male with congenital adrenal hyperplasia, on hydrocortisone and Florinef therapy.  He was previously seen by Dr. Cordova and he established care with me in 2018. The patient was diagnosed at 1 month of age and has been on lifelong hormone replacement.  Current dose of hydrocortisone is 20 mg in the morning, when he wakes up, and 10 mg around 2 PM.  He takes the Florinef dose in the morning, 0.1 mg daily.     On the lab work from 12/13/2021, done at 5:30 PM, CMP was normal, renin was 3.7, ACTH was 15, androstendione was normal at 1284, cortisol level was 6.1 at 5:30 PM, DHEA-S was 99, both total and free testosterone were low (155 and 2.71, checked in the afternoon), LH was 3.4 and FSH was 2.8. Morning free testosterone was 3.16 in 12/21.     The testicular US from 12/2018 revealed irregular similar-appearing hypoechoic foci in the left and right testes, measuring 1.1 and 2.7 cm, suggestive of  testicular adrenal rests. They remained stable on the f/up testicular US from 12/2019 and 12/13/2021.   A1c was 5.1%, TSH was normal at 1.49, on 9/22/23.     Today, the patient denies any specific complaints.  The depression significantly improved.  Denies experiencing episodes of sickness which required a higher dose of hydrocortisone since his last visit here.     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/2001    Repair 2001 after had MI;    Asthma, moderate persistent, well-controlled     Congenital adrenal hyperplasia (H) 1989    Dx at 1 mo of age; mother with CAH also (?)   Depression   No fractures  Asthma  Urinary urgency/retention -evaluated in the urology clinic in 2020  Tinea corporis     Past Surgical History   Past Surgical History:   Procedure Laterality Date    aortic stenosis s/p surgical repair including cor reconstruction 2001         Current Medications    Current Outpatient Medications:     buPROPion (WELLBUTRIN XL) 150 MG 24 hr tablet, Take 150 mg by mouth every morning, Disp: , Rfl:     fludrocortisone (FLORINEF) 0.1 MG tablet, Take 1 tablet (0.1 mg) by mouth daily, Disp: 90 tablet, Rfl: 0    hydrocortisone (CORTEF) 10 MG tablet, TAKE 2 TABLETS BY MOUTH IN THE MORNING AND 1 TABLET IN AFTERNOON/EVENING, Disp: 90 tablet, Rfl: 0    hydrocortisone sodium succinate PF (SOLU-CORTEF) 100 MG injection, Inject 2 mLs (100 mg) into the muscle once for 1 dose Dispense as Act-O-Vial, Disp: 2 mL, Rfl: 1    ketoconazole (NIZORAL) 2 % external cream, Apply topically daily X 2-3 weeks until clinical resolution, Disp: 60 g, Rfl: 0    lamoTRIgine (LAMICTAL) 100 MG tablet, Take 100 mg by mouth daily, Disp: , Rfl:     SYMBICORT 160-4.5 MCG/ACT Inhaler, Inhale 2 puffs into the lungs 2 times daily and 1 to 2 inhalations with spacer, as needed, every 4 hours; for persistent symptoms, may administer up to 2 inhalations every 20 minutes for 3  "doses. Maximum dose: 12 inhalations/day, Disp: 10.2 g, Rfl: 3    Syringe/Needle, Disp, (SYRINGE LUER LOCK) 23G X 1\" 3 ML MISC, 100 mg as needed, Disp: 3 each, Rfl: 3     Family History   Mother - congenital adrenal hyperplasia. Father - CVA in his 60s.     Social History  Single. No children. Former smoker, 1/2 PPD for 10 years.  Denies using illicit drugs. 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     Review of Systems   Systemic:             Weight down 14 lbs in the last year   Eye:                      No eye symptoms   Lizeth-Laryngeal:     No lizeth-laryngeal symptoms, no dysphagia, no hoarseness, no cough     Breast:                  No breast symptoms  Cardiovascular:    No cardiovascular symptoms, no CP or palpitations   Pulmonary:           No pulmonary symptoms, no SOB or cough    Gastrointestinal:   No gastrointestinal symptoms, no diarrhea or constipation   Genitourinary:      Urinates ~1 times a night, 2-3 times a week   Endocrine:            No endocrine symptoms, no cold or heat intolerance; no ED; some difficulty with ejaculation     Neurological:        No headaches, no tremor, no numbness or tingling sensation, no dizziness   Musculoskeletal:  Lower back pain present intermittently   Skin:                     no dry skin, no hair falling out; no changes of the facial hair; no acne, no stretch marks; no easy bruising   Psychological:      Depression - longstanding and improving               Vital Signs     Previous Weights:    Wt Readings from Last 10 Encounters:   11/20/23 72.1 kg (159 lb)   09/20/23 73.9 kg (163 lb)   04/26/23 78.5 kg (173 lb)   06/20/22 77 kg (169 lb 12.8 oz)   12/20/21 72.6 kg (160 lb)   01/22/20 61.2 kg (135 lb)   12/24/19 64.5 kg (142 lb 4.8 oz)   12/02/19 62.5 kg (137 lb 12.8 oz)   12/20/18 70.2 kg (154 lb 12.8 oz)   12/03/18 68.9 kg (152 lb)     BP Readings from Last 6 Encounters:   11/20/23 138/78   09/20/23 126/74   04/26/23 132/75   06/20/22 128/81   12/20/21 " (!) 144/72   11/19/20 122/83        /78   Pulse 89   Wt 72.1 kg (159 lb)   SpO2 99%   BMI 24.46 kg/m      General appearance: he is well-developed, well-nourished, no distress noted   Eyes: conjunctivae and extraocular motions are normal. Pupils are equal, round, and reactive to light. No lid lag, no stare.  HENT: oropharynx clear and moist; neck no JVD, no bruits, no thyromegaly, no palpable nodules  Cardiovascular: regular rhythm, hyperkinetic, no murmurs, distal pulses palpable, no edema  Respiratory: chest clear, no rales, no rhonchi  Gastrointestinal: abdomen soft, nontender, nondistended, +BS, no organomegaly  Musculoskeletal: normal tone and strength   Neurologic: reflexes normal and symmetric, fine resting tremor of the outstretched hands  Psychiatric: affect and judgment normal   Skin: No stretch marks, no bruises, no hyperpigmentation    Lab Results  I reviewed prior lab results documented in Epic.  TSH   Date Value Ref Range Status   09/22/2023 1.49 0.30 - 4.20 uIU/mL Final   12/22/2021 0.29 (L) 0.40 - 4.00 mU/L Final   08/07/2008 0.34 mcU/mL Final

## 2023-12-10 DIAGNOSIS — J45.40 ASTHMA, MODERATE PERSISTENT, WELL-CONTROLLED: ICD-10-CM

## 2023-12-14 RX ORDER — BUDESONIDE AND FORMOTEROL FUMARATE DIHYDRATE 160; 4.5 UG/1; UG/1
AEROSOL RESPIRATORY (INHALATION)
Qty: 10.2 G | Refills: 1 | Status: SHIPPED | OUTPATIENT
Start: 2023-12-14 | End: 2024-03-14

## 2023-12-14 NOTE — TELEPHONE ENCOUNTER
SYMBICORT 160-4.5 MCG/ACT Inhaler 10.2 g 3 4/26/2023  Last Office Visit : 9/20/2023  [facility]     Chemo Waters, APRN CNP     Future Office visit:  0    Routing refill request to provider for review/approval because:  ACT overdue

## 2023-12-26 ENCOUNTER — TELEPHONE (OUTPATIENT)
Dept: FAMILY MEDICINE | Facility: CLINIC | Age: 33
End: 2023-12-26

## 2023-12-26 DIAGNOSIS — E27.40 ADRENAL INSUFFICIENCY (H): ICD-10-CM

## 2023-12-26 RX ORDER — HYDROCORTISONE 10 MG/1
TABLET ORAL
Qty: 300 TABLET | Refills: 2 | Status: SHIPPED | OUTPATIENT
Start: 2023-12-26

## 2023-12-26 NOTE — TELEPHONE ENCOUNTER
[facility] Call Center    Phone Message    May a detailed message be left on voicemail: yes     Reason for Call: Medication Refill Request    Has the patient contacted the pharmacy for the refill? Yes   Name of medication being requested:   hydrocortisone (CORTEF) 10 MG tablet   Provider who prescribed the medication: Dr Castro  Pharmacy:      [id number] - [place], [place] - [address]  AT [facility] [address]    Date medication is needed: ASAP  by end of day on 12/27/2023 Patient requested this medication last week from the pharmacy.    {  Action Taken: Message routed to:  [facility] Endo    Travel Screening: Not Applicable

## 2023-12-26 NOTE — TELEPHONE ENCOUNTER
hydrocortisone (CORTEF) 10 MG tablet   300 tablet 3 11/20/2023    Pharm states picked up 100 tabs, 1100 remaining  Sent #[id number] 2 rfs.    Remaining refills sent to requested pharmacy. Per WO,per pt calls

## 2024-01-02 ENCOUNTER — TELEPHONE (OUTPATIENT)
Dept: ENDOCRINOLOGY | Facility: CLINIC | Age: 34
End: 2024-01-02
Payer: COMMERCIAL

## 2024-01-02 DIAGNOSIS — E25.0 CONGENITAL ADRENAL HYPERPLASIA DUE TO 21-HYDROXYLASE DEFICIENCY (21-OH CAH), SIMPLE VIRILIZING (H): ICD-10-CM

## 2024-01-02 RX ORDER — FLUDROCORTISONE ACETATE 0.1 MG/1
0.1 TABLET ORAL DAILY
Qty: 90 TABLET | Refills: 3 | Status: SHIPPED | OUTPATIENT
Start: 2024-01-02

## 2024-01-02 NOTE — TELEPHONE ENCOUNTER
[facility]    Phone Message    May a detailed message be left on voicemail: yes     Reason for Call: Medication Refill Request    Has the patient contacted the pharmacy for the refill? Yes   Name of medication being requested:   fludrocortisone (FLORINEF) 0.1 MG tablet    Provider who prescribed the medication: Dr. Warner  Pharmacy:   [id number] - [place], [place] - [address] AT [facility] [address]     Date medication is needed: asap, 1/3/23       Action Taken: Message routed to:  [facility]      Travel Screening: Not Applicable

## 2024-01-23 ENCOUNTER — LAB (OUTPATIENT)
Dept: LAB | Facility: CLINIC | Age: 34
End: 2024-01-23
Payer: COMMERCIAL

## 2024-01-23 DIAGNOSIS — E25.0 CONGENITAL ADRENAL HYPERPLASIA DUE TO 21-HYDROXYLASE DEFICIENCY (21-OH CAH), SIMPLE VIRILIZING (H): ICD-10-CM

## 2024-01-23 LAB
CORTIS SERPL-MCNC: 21.9 UG/DL
DHEA-S SERPL-MCNC: 109 UG/DL (ref 80–560)
FSH SERPL IRP2-ACNC: 4 MIU/ML (ref 1.5–12.4)
LH SERPL-ACNC: 2 MIU/ML (ref 1.7–8.6)
SHBG SERPL-SCNC: 37 NMOL/L (ref 11–80)

## 2024-01-23 PROCEDURE — 84270 ASSAY OF SEX HORMONE GLOBUL: CPT | Performed by: INTERNAL MEDICINE

## 2024-01-23 PROCEDURE — 83002 ASSAY OF GONADOTROPIN (LH): CPT | Performed by: INTERNAL MEDICINE

## 2024-01-23 PROCEDURE — 82533 TOTAL CORTISOL: CPT | Performed by: INTERNAL MEDICINE

## 2024-01-23 PROCEDURE — 82627 DEHYDROEPIANDROSTERONE: CPT | Performed by: INTERNAL MEDICINE

## 2024-01-23 PROCEDURE — 84244 ASSAY OF RENIN: CPT | Mod: 90 | Performed by: PATHOLOGY

## 2024-01-23 PROCEDURE — 99000 SPECIMEN HANDLING OFFICE-LAB: CPT | Performed by: PATHOLOGY

## 2024-01-23 PROCEDURE — 82024 ASSAY OF ACTH: CPT | Performed by: INTERNAL MEDICINE

## 2024-01-23 PROCEDURE — 83001 ASSAY OF GONADOTROPIN (FSH): CPT | Performed by: INTERNAL MEDICINE

## 2024-01-23 PROCEDURE — 84403 ASSAY OF TOTAL TESTOSTERONE: CPT | Performed by: INTERNAL MEDICINE

## 2024-01-23 PROCEDURE — 82157 ASSAY OF ANDROSTENEDIONE: CPT | Mod: 90 | Performed by: PATHOLOGY

## 2024-01-23 PROCEDURE — 36415 COLL VENOUS BLD VENIPUNCTURE: CPT | Performed by: PATHOLOGY

## 2024-01-24 LAB — ACTH PLAS-MCNC: 56 PG/ML

## 2024-01-25 LAB
TESTOST FREE SERPL-MCNC: 8.94 NG/DL
TESTOST SERPL-MCNC: 462 NG/DL (ref 240–950)

## 2024-01-26 LAB
ANDROST SERPL-MCNC: 5.12 NG/ML
RENIN PLAS-CCNC: 1.2 NG/ML/HR

## 2024-01-26 NOTE — RESULT ENCOUNTER NOTE
Hi Laci!    The lab results are good.  At your last clinic visit, your blood pressure was a little higher.  If you have a blood pressure cuff at home, please check it and let us know if the numbers are above 130/80.  Alternatively, you can have it checked at your local pharmacy or in our clinic.    Sincerely,   Deja Warner

## 2024-02-09 ENCOUNTER — TELEPHONE (OUTPATIENT)
Dept: ENDOCRINOLOGY | Facility: CLINIC | Age: 34
End: 2024-02-09
Payer: COMMERCIAL

## 2024-02-29 ENCOUNTER — MYC REFILL (OUTPATIENT)
Dept: FAMILY MEDICINE | Facility: CLINIC | Age: 34
End: 2024-02-29

## 2024-02-29 DIAGNOSIS — B35.4 TINEA CORPORIS: ICD-10-CM

## 2024-02-29 RX ORDER — KETOCONAZOLE 20 MG/G
CREAM TOPICAL DAILY
Qty: 60 G | Refills: 2 | Status: SHIPPED | OUTPATIENT
Start: 2024-02-29 | End: 2024-07-05

## 2024-04-19 ASSESSMENT — ANXIETY QUESTIONNAIRES
4. TROUBLE RELAXING: SEVERAL DAYS
GAD7 TOTAL SCORE: 7
6. BECOMING EASILY ANNOYED OR IRRITABLE: NOT AT ALL
2. NOT BEING ABLE TO STOP OR CONTROL WORRYING: MORE THAN HALF THE DAYS
7. FEELING AFRAID AS IF SOMETHING AWFUL MIGHT HAPPEN: NOT AT ALL
3. WORRYING TOO MUCH ABOUT DIFFERENT THINGS: MORE THAN HALF THE DAYS
IF YOU CHECKED OFF ANY PROBLEMS ON THIS QUESTIONNAIRE, HOW DIFFICULT HAVE THESE PROBLEMS MADE IT FOR YOU TO DO YOUR WORK, TAKE CARE OF THINGS AT HOME, OR GET ALONG WITH OTHER PEOPLE: SOMEWHAT DIFFICULT
1. FEELING NERVOUS, ANXIOUS, OR ON EDGE: MORE THAN HALF THE DAYS
5. BEING SO RESTLESS THAT IT IS HARD TO SIT STILL: NOT AT ALL

## 2024-04-19 ASSESSMENT — PATIENT HEALTH QUESTIONNAIRE - PHQ9: SUM OF ALL RESPONSES TO PHQ QUESTIONS 1-9: 0

## 2024-05-02 ASSESSMENT — ANXIETY QUESTIONNAIRES
7. FEELING AFRAID AS IF SOMETHING AWFUL MIGHT HAPPEN: NOT AT ALL
8. IF YOU CHECKED OFF ANY PROBLEMS, HOW DIFFICULT HAVE THESE MADE IT FOR YOU TO DO YOUR WORK, TAKE CARE OF THINGS AT HOME, OR GET ALONG WITH OTHER PEOPLE?: SOMEWHAT DIFFICULT
4. TROUBLE RELAXING: SEVERAL DAYS
GAD7 TOTAL SCORE: 7
7. FEELING AFRAID AS IF SOMETHING AWFUL MIGHT HAPPEN: NOT AT ALL
6. BECOMING EASILY ANNOYED OR IRRITABLE: NOT AT ALL
IF YOU CHECKED OFF ANY PROBLEMS ON THIS QUESTIONNAIRE, HOW DIFFICULT HAVE THESE PROBLEMS MADE IT FOR YOU TO DO YOUR WORK, TAKE CARE OF THINGS AT HOME, OR GET ALONG WITH OTHER PEOPLE: SOMEWHAT DIFFICULT
3. WORRYING TOO MUCH ABOUT DIFFERENT THINGS: MORE THAN HALF THE DAYS
2. NOT BEING ABLE TO STOP OR CONTROL WORRYING: MORE THAN HALF THE DAYS
1. FEELING NERVOUS, ANXIOUS, OR ON EDGE: MORE THAN HALF THE DAYS
5. BEING SO RESTLESS THAT IT IS HARD TO SIT STILL: NOT AT ALL
GAD7 TOTAL SCORE: 7

## 2024-05-02 ASSESSMENT — ASTHMA QUESTIONNAIRES
QUESTION_2 LAST FOUR WEEKS HOW OFTEN HAVE YOU HAD SHORTNESS OF BREATH: NOT AT ALL
QUESTION_4 LAST FOUR WEEKS HOW OFTEN HAVE YOU USED YOUR RESCUE INHALER OR NEBULIZER MEDICATION (SUCH AS ALBUTEROL): NOT AT ALL
QUESTION_1 LAST FOUR WEEKS HOW MUCH OF THE TIME DID YOUR ASTHMA KEEP YOU FROM GETTING AS MUCH DONE AT WORK, SCHOOL OR AT HOME: NONE OF THE TIME
QUESTION_3 LAST FOUR WEEKS HOW OFTEN DID YOUR ASTHMA SYMPTOMS (WHEEZING, COUGHING, SHORTNESS OF BREATH, CHEST TIGHTNESS OR PAIN) WAKE YOU UP AT NIGHT OR EARLIER THAN USUAL IN THE MORNING: NOT AT ALL
QUESTION_5 LAST FOUR WEEKS HOW WOULD YOU RATE YOUR ASTHMA CONTROL: COMPLETELY CONTROLLED
ACT_TOTALSCORE: 25

## 2024-05-02 ASSESSMENT — PATIENT HEALTH QUESTIONNAIRE - PHQ9: SUM OF ALL RESPONSES TO PHQ QUESTIONS 1-9: 0

## 2024-05-03 ENCOUNTER — OFFICE VISIT (OUTPATIENT)
Dept: FAMILY MEDICINE | Facility: CLINIC | Age: 34
End: 2024-05-03
Payer: COMMERCIAL

## 2024-05-03 VITALS
RESPIRATION RATE: 17 BRPM | BODY MASS INDEX: 21.85 KG/M2 | TEMPERATURE: 98.3 F | DIASTOLIC BLOOD PRESSURE: 73 MMHG | WEIGHT: 142 LBS | OXYGEN SATURATION: 99 % | SYSTOLIC BLOOD PRESSURE: 117 MMHG | HEART RATE: 86 BPM

## 2024-05-03 DIAGNOSIS — Z23 ENCOUNTER FOR IMMUNIZATION: ICD-10-CM

## 2024-05-03 DIAGNOSIS — Q25.29 AORTIC ATRESIA OR STENOSIS, CONGENITAL: ICD-10-CM

## 2024-05-03 DIAGNOSIS — J45.20 ASTHMA, MILD INTERMITTENT, WELL-CONTROLLED: ICD-10-CM

## 2024-05-03 DIAGNOSIS — L21.9 SEBORRHEIC DERMATITIS OF SCALP: ICD-10-CM

## 2024-05-03 DIAGNOSIS — Q25.1 AORTIC ATRESIA OR STENOSIS, CONGENITAL: ICD-10-CM

## 2024-05-03 DIAGNOSIS — Z00.00 HEALTHCARE MAINTENANCE: Primary | ICD-10-CM

## 2024-05-03 DIAGNOSIS — N52.9 ERECTILE DYSFUNCTION, UNSPECIFIED ERECTILE DYSFUNCTION TYPE: ICD-10-CM

## 2024-05-03 RX ORDER — SILDENAFIL 50 MG/1
50 TABLET, FILM COATED ORAL DAILY PRN
Qty: 20 TABLET | Refills: 1 | Status: SHIPPED | OUTPATIENT
Start: 2024-05-03 | End: 2024-07-05

## 2024-05-03 RX ORDER — KETOCONAZOLE 20 MG/ML
SHAMPOO TOPICAL
Qty: 120 ML | Refills: 3 | Status: SHIPPED | OUTPATIENT
Start: 2024-05-06

## 2024-05-03 ASSESSMENT — PATIENT HEALTH QUESTIONNAIRE - PHQ9
SUM OF ALL RESPONSES TO PHQ QUESTIONS 1-9: 0
10. IF YOU CHECKED OFF ANY PROBLEMS, HOW DIFFICULT HAVE THESE PROBLEMS MADE IT FOR YOU TO DO YOUR WORK, TAKE CARE OF THINGS AT HOME, OR GET ALONG WITH OTHER PEOPLE: NOT DIFFICULT AT ALL

## 2024-05-03 ASSESSMENT — ASTHMA QUESTIONNAIRES: ACT_TOTALSCORE: 25

## 2024-05-03 ASSESSMENT — ANXIETY QUESTIONNAIRES: GAD7 TOTAL SCORE: 7

## 2024-05-03 ASSESSMENT — PAIN SCALES - GENERAL: PAINLEVEL: NO PAIN (0)

## 2024-05-03 NOTE — PROGRESS NOTES
"     ANNUAL WELLNESS EXAM     Today's Date: May 3, 2024     Patient Laci Dsouza 1990 presents to the clinic today for a preventative health visit.         SUBJECTIVE     History of Present Illness:        33-year-old male with past medical history congenital adrenal hyperplasia, asthma, anxiety, and aortic atresia s/p repair 2001 following MI presents for annual physical.  Overall, patient is doing very well.  He is exercising daily.  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    Asthma-ACT 25 today.  Patient denies acute concerns regarding asthma. Current regimen includes Symbicort SMART therapy.    Dandruff- Patient has history of chronic dandruff. He has tried head and shoulders 2x/week which has been ineffective.    History of MDD/anxiety-patient follows psychiatry for this.  He reports he is in a \"much better place\" today.  He reports he has had some erectile dysfunction and premature ejaculation since starting Wellbutrin and Lamictal. He also endorses performance anxiety since the ED started. He obtained Viagra (100mg) from a friend which has helped tremendously.  He is interested in obtaining the prescription. No other acute concerns/symptoms at time of exam.          Allergies   Allergen Reactions    Ceclor [Cefaclor Monohydrate]      Unknown      Sulfa Antibiotics      Unknown          Current Outpatient Medications   Medication Instructions    budesonide-formoterol (SYMBICORT) 160-4.5 MCG/ACT Inhaler INHALE 2 PUFFS IN THE LUNGS TWICE DAILY AND 1 TO 2 WITH SPACER EVERY 4 HOURS FOR PERSISTENT SYMPTOMS. MAY USE 2 EVERY 20 MINUTES FOR 3 DOSES. MAX OF 12. IMPORTANT**DUE FOR FOLLOW UP APPT    buPROPion (WELLBUTRIN XL) 150 mg, Oral, EVERY MORNING    fludrocortisone (FLORINEF) 0.1 mg, Oral, DAILY    hydrocortisone (CORTEF) 10 MG tablet Take 2 tablets in the morning and 1 tablet in the afternoon.  Plus extra tablets for sickness.    hydrocortisone sodium succinate PF (SOLU-CORTEF) " injection 100 mg, Intramuscular, ONCE PRN, Dispense as Act-O-Vial    ketoconazole (NIZORAL) 2 % external cream Topical, DAILY, X 2-3 weeks until clinical resolution    lamoTRIgine (LAMICTAL) 100 mg, Oral, DAILY    Syringe Luer Lock 100 mg, Does not apply, PRN       Past Medical History:   Diagnosis Date    Anxiety     Aortic atresia or stenosis, congenital 12/2001    Repair 2001 after had MI;    Asthma, moderate persistent, well-controlled     Congenital adrenal hyperplasia (H24) 1989    Dx at 1 mo of age; mother with CAH also (?)        Family History   Adopted: Yes   Problem Relation Age of Onset    Bipolar Disorder Father         Do you have a first-degree relative with a history of the following:  A. Cancer of the prostate or intestine - No  B. Heart attack, heart pain, or stroke before the age of 55 - No      Social History     Tobacco Use    Smoking status: Former     Types: Cigarettes    Smokeless tobacco: Never   Substance Use Topics    Alcohol use: Yes    Drug use: Never        History   Sexual Activity    Sexual activity: Yes    Partners: Male             4/26/2023     3:56 PM 4/19/2024     9:27 AM 5/2/2024     4:52 PM   PHQ   PHQ-9 Total Score 7 0    0 0   Q9: Thoughts of better off dead/self-harm past 2 weeks Not at all Not at all Not at all        Immunization History   Administered Date(s) Administered    COVID-19 Bivalent 18+ (Moderna) 11/16/2022    COVID-19 MONOVALENT 12+ (Pfizer) 10/22/2021    COVID-19 Vaccine (Dipesh) 03/13/2021    HIB, Unspecified 05/17/1991    HPV Quadrivalent 06/20/2014, 08/25/2014, 12/17/2014    Hepatitis B, Peds 12/14/2000, 06/20/2001, 05/08/2002    Historical DTP/aP 1989, 01/06/1990, 02/12/1990, 05/17/1991, 07/14/1994    Influenza (IIV3) PF 11/26/1991, 10/23/1992, 10/25/1993, 12/10/1994, 12/07/1995, 11/08/1997, 10/20/1998, 10/28/1999, 10/11/2001, 10/04/2002, 09/17/2003, 10/23/2004, 12/02/2005, 11/16/2006, 08/20/2013, 10/01/2014    Influenza Vaccine >6 months,quad, PF  10/09/2013, 10/02/2017, 10/01/2018, 10/02/2019, 10/22/2021, 09/20/2023    Influenza Vaccine, 6+MO IM (QUADRIVALENT W/PRESERVATIVES) 09/15/2016    Influenza, seasonal, injectable, PF 01/15/2009    Influenza,INJ,MDCK,PF,Quad >6mo(Flucelvax) 09/05/2020, 11/16/2022    MMR 11/02/1990, 12/14/2000    Nasal Influenza Vaccine 2-49 (FluMist) 09/29/2015    Pneumococcal 23 valent 01/15/2009    Polio, Unspecified 1989, 01/06/1990, 05/17/1991, 07/14/1994    TDAP (Adacel,Boostrix) 01/15/2009, 06/20/2014    Td (Adult), Adsorbed 12/14/2000        Health Maintenance Due   Topic Date Due    ADVANCE CARE PLANNING  Never done    DEPRESSION ACTION PLAN  Never done    HEPATITIS C SCREENING  Never done    HEPATITIS A IMMUNIZATION (1 of 2 - Risk 2-dose series) Never done    Pneumococcal Vaccine: Pediatrics (0 to 5 Years) and At-Risk Patients (6 to 64 Years) (2 of 2 - PCV) 01/15/2010    YEARLY PREVENTIVE VISIT  12/24/2020    COVID-19 Vaccine (4 - 2023-24 season) 09/01/2023      Health Maintenance components reviewed -  COVID- last booster 10/2023.      Exercise: 7 days/week    Review of Systems   Constitutional, HEENT, cardiovascular, pulmonary, gi and gu systems are negative, except as otherwise noted.           OBJECTIVE     /73 (BP Location: Left arm, Patient Position: Sitting, Cuff Size: Adult Regular)   Pulse 86   Temp 98.3  F (36.8  C) (Oral)   Resp 17   Wt 64.4 kg (142 lb)   SpO2 99%   BMI 21.85 kg/m               Labs:  Lab Results   Component Value Date    WBC 7.5 09/22/2023    HGB 13.9 09/22/2023    HCT 41.6 09/22/2023     09/22/2023    ALT 8 09/22/2023    AST 15 09/22/2023     09/22/2023    BUN 13.3 09/22/2023    CO2 23 09/22/2023    TSH 1.49 09/22/2023    PSA 0.44 12/02/2019       Physical Exam  Constitutional:       General: He is not in acute distress.     Appearance: He is not ill-appearing.   HENT:      Right Ear: Tympanic membrane normal.      Left Ear: Tympanic membrane normal.      Nose: No  rhinorrhea.      Mouth/Throat:      Pharynx: Oropharynx is clear.   Eyes:      Extraocular Movements: Extraocular movements intact.      Pupils: Pupils are equal, round, and reactive to light.   Cardiovascular:      Rate and Rhythm: Normal rate and regular rhythm.      Heart sounds: Normal heart sounds. No murmur heard.  Pulmonary:      Effort: Pulmonary effort is normal. No respiratory distress.      Breath sounds: Normal breath sounds. No wheezing or rales.   Abdominal:      General: Bowel sounds are normal.      Palpations: Abdomen is soft.      Tenderness: There is no abdominal tenderness.   Musculoskeletal:      Cervical back: Neck supple.      Right lower leg: No edema.      Left lower leg: No edema.   Lymphadenopathy:      Cervical: No cervical adenopathy.   Skin:     Comments: Mild seborrheic dermatitis of scalp noted.   Neurological:      General: No focal deficit present.      Mental Status: He is alert.   Psychiatric:         Thought Content: Thought content normal.         Judgment: Judgment normal.               ASSESSMENT/PLAN     1. Healthcare maintenance    -Discussed/Reinforced healthy diety, lifestyle, exercise and safety.  -Recommended completion of routine dental and eye exam.  -Lab screenings completed today. Results pending.     2. Erectile dysfunction, unspecified erectile dysfunction type  Multifactorial in the setting of antidepressant use and performance anxiety. Patient reports that he used 100mg sildenafil which helped tremendously. Will rx 50mg today, if ineffective, then will increase 100mg. Instructed ER for erection >4 hours. Patient not on nitroglycerin.  - sildenafil (VIAGRA) 50 MG tablet; Take 1 tablet (50 mg) by mouth daily as needed (ED)  Dispense: 20 tablet; Refill: 1    3. Encounter for immunization  - PNEUMOCOCCAL 20 VALENT CONJUGATE (PREVNAR 20)  - TDAP VACCINE (Adacel, Boostrix)  [[id number]]    4. Asthma, mild intermittent, well-controlled  ACT 25. Lung sounds clear. AAP  provided to patient.    5. Aortic atresia or stenosis, congenital  Patient exercising 7 days/week without concerns.  - Lipid panel; Future    6. Seborrheic dermatitis of scalp  Initiate trial of ketoconazole shampoo 2x/week.  Follow-up if no improvement.  - ketoconazole (NIZORAL) 2 % external shampoo; Apply topically twice a week Apply 5 to 10 mL to wet scalp; leave on for 3 to 5 minutes and then rinse off thoroughly.  Dispense: 120 mL; Refill: 3            Follow-Up:  Follow up in one year, or sooner if needed.     Patient engaged in their plan of care. Patient verbalized understanding and agreed with the final plan.  AVS printed and given to patient.    PREM Harmon [facility]  [address] [place] [address] [id number] [phone]

## 2024-05-03 NOTE — LETTER
My Asthma Action Plan    Name: Laci Dsouza   YOB: 1990  Date: 5/3/2024   My doctor: PREM Harmon CNP   My clinic:  [facility]        My Rescue Medicine:   Albuterol inhaler (Proair/Ventolin/Proventil HFA)  2-4 puffs EVERY 4 HOURS as needed. Use a spacer if recommended by your provider.   My Asthma Severity:   Intermittent / Exercise Induced  Know your asthma triggers: smoke and upper respiratory infections  None          GREEN ZONE   Good Control  I feel good  No cough or wheeze  Can work, sleep and play without asthma symptoms       Take your asthma control medicine every day.     If exercise triggers your asthma, take your rescue medication  15 minutes before exercise or sports, and  During exercise if you have asthma symptoms  Spacer to use with inhaler: If you have a spacer, make sure to use it with your inhaler             YELLOW ZONE Getting Worse  I have ANY of these:  I do not feel good  Cough or wheeze  Chest feels tight  Wake up at night   Keep taking your Green Zone medications  Start taking your rescue medicine:  every 20 minutes for up to 1 hour. Then every 4 hours for 24-48 hours.  If you stay in the Yellow Zone for more than 12-24 hours, contact your doctor.  If you do not return to the Green Zone in 12-24 hours or you get worse, start taking your oral steroid medicine if prescribed by your provider.           RED ZONE Medical Alert - Get Help  I have ANY of these:  I feel awful  Medicine is not helping  Breathing getting harder  Trouble walking or talking  Nose opens wide to breathe       Take your rescue medicine NOW  If your provider has prescribed an oral steroid medicine, start taking it NOW  Call your doctor NOW  If you are still in the Red Zone after 20 minutes and you have not reached your doctor:  Take your rescue medicine again and  Call 911 or go to the emergency room right away    See your regular doctor within 2 weeks of an Emergency  Room or Urgent Care visit for follow-up treatment.          Annual Reminders:  Meet with Asthma Educator,  Flu Shot in the Fall, consider Pneumonia Vaccination for patients with asthma (aged 19 and older).    Pharmacy:    [facility]Y #[id number] CLOSED - [place], [place] - [address] AT [facility] #[id number] - [place], [place] - [address] AT [facility] [address]    Electronically signed by PREM Harmon CNP   Date: 05/03/24                    Asthma Triggers  How To Control Things That Make Your Asthma Worse    Triggers are things that make your asthma worse.  Look at the list below to help you find your triggers and   what you can do about them. You can help prevent asthma flare-ups by staying away from your triggers.      Trigger                                                          What you can do   Cigarette Smoke  Tobacco smoke can make asthma worse. Do not allow smoking in your home, car or around you.  Be sure no one smokes at a child s day care or school.  If you smoke, ask your health care provider for ways to help you quit.  Ask family members to quit too.  Ask your health care provider for a referral to Quit Plan to help you quit smoking, or call 3-849-914-PLAN.     Colds, Flu, Bronchitis  These are common triggers of asthma. Wash your hands often.  Don t touch your eyes, nose or mouth.  Get a flu shot every year.     Dust Mites  These are tiny bugs that live in cloth or carpet. They are too small to see. Wash sheets and blankets in hot water every week.   Encase pillows and mattress in dust mite proof covers.  Avoid having carpet if you can. If you have carpet, vacuum weekly.   Use a dust mask and HEPA vacuum.   Pollen and Outdoor Mold  Some people are allergic to trees, grass, or weed pollen, or molds. Try to keep your windows closed.  Limit time out doors when pollen count is high.   Ask you health care provider about taking medicine during  allergy season.     Animal Dander  Some people are allergic to skin flakes, urine or saliva from pets with fur or feathers. Keep pets with fur or feathers out of your home.    If you can t keep the pet outdoors, then keep the pet out of your bedroom.  Keep the bedroom door closed.  Keep pets off cloth furniture and away from stuffed toys.     Mice, Rats, and Cockroaches  Some people are allergic to the waste from these pests.   Cover food and garbage.  Clean up spills and food crumbs.  Store grease in the refrigerator.   Keep food out of the bedroom.   Indoor Mold  This can be a trigger if your home has high moisture. Fix leaking faucets, pipes, or other sources of water.   Clean moldy surfaces.  Dehumidify basement if it is damp and smelly.   Smoke, Strong Odors, and Sprays  These can reduce air quality. Stay away from strong odors and sprays, such as perfume, powder, hair spray, paints, smoke incense, paint, cleaning products, candles and new carpet.   Exercise or Sports  Some people with asthma have this trigger. Be active!  Ask your doctor about taking medicine before sports or exercise to prevent symptoms.    Warm up for 5-10 minutes before and after sports or exercise.     Other Triggers of Asthma  Cold air:  Cover your nose and mouth with a scarf.  Sometimes laughing or crying can be a trigger.  Some medicines and food can trigger asthma.

## 2024-06-10 DIAGNOSIS — J45.40 ASTHMA, MODERATE PERSISTENT, WELL-CONTROLLED: ICD-10-CM

## 2024-06-12 ENCOUNTER — TELEPHONE (OUTPATIENT)
Dept: ENDOCRINOLOGY | Facility: CLINIC | Age: 34
End: 2024-06-12
Payer: COMMERCIAL

## 2024-06-12 NOTE — TELEPHONE ENCOUNTER
Left Voicemail (1st Attempt) for the patient to call back and schedule the following:    Appointment type: return endocrine   Provider: Alana  Return date: re-[facility] 11/18 to next avail   Specialty phone number: [phone]  Additional appointment(s) needed: NA  Additonal Notes: LVM, MyC x 1  Due to changes in the providers schedule re-[facility] 11/18 appt to the next avail visit. Thank you.    Ela Pittman on 6/12/2024 at 4:31 PM

## 2024-06-18 RX ORDER — BUDESONIDE AND FORMOTEROL FUMARATE DIHYDRATE 160; 4.5 UG/1; UG/1
AEROSOL RESPIRATORY (INHALATION)
Qty: 10.2 G | Refills: 1 | Status: SHIPPED | OUTPATIENT
Start: 2024-06-18 | End: 2024-07-05

## 2024-06-18 NOTE — TELEPHONE ENCOUNTER
Left Voicemail (2nd Attempt) for the patient to call back and schedule the following:     Appointment type: return endocrine   Provider: Alana  Return date: re-[facility] 11/18 to next avail   Specialty phone number: [phone]  Additional appointment(s) needed: NA  Additonal Notes: LVM x2 -- appt cancelled  Due to changes in the providers schedule re-[facility] 11/18 appt to the next avail visit. Thank you.

## 2024-06-19 NOTE — TELEPHONE ENCOUNTER
Requested Prescriptions   Pending Prescriptions Disp Refills    budesonide-formoterol (SYMBICORT) 160-4.5 MCG/ACT Inhaler [Pharmacy Med Name: BUDESONIDE/FORM 160/4.5MCG(120 INH)] 10.2 g 1     Sig: INHALE 2 PUFFS BY MOUTH TWICE DAILY AND 1-2 WITH SPACER EVERY 4 HOURS FOR PERSISENT SYMPTOMS. MAY USE 2 EVERY 20 MINS FOR 3 DOSES. MAX OF 12       Inhaled Steroids Protocol Passed - 6/10/2024  3:47 AM        Passed - Patient is age 12 or older        Passed - Asthma control assessment score within normal limits in last 6 months     Please review ACT score.           Passed - Medication is active on med list        Passed - Recent (6 mo) or future (90 days) visit within the authorizing provider's specialty     The patient must have completed an in-person or virtual visit within the past 6 months or has a future visit scheduled within the next 90 days with the authorizing provider s specialty.  Urgent care and e-visits do not quality as an office visit for this protocol.          Passed - Medication indicated for associated diagnosis     Medication is associated with one or more of the following diagnoses:    Allergies   Asthma   COPD   Nasal Congestion   Nasal Polyps   Rhinitis               Rx approved per RN protocol.       Constanza Teran RN on 6/18/2024 at 9:57 PM

## 2024-07-05 ENCOUNTER — MYC MEDICAL ADVICE (OUTPATIENT)
Dept: FAMILY MEDICINE | Facility: CLINIC | Age: 34
End: 2024-07-05

## 2024-07-05 ENCOUNTER — MYC REFILL (OUTPATIENT)
Dept: FAMILY MEDICINE | Facility: CLINIC | Age: 34
End: 2024-07-05

## 2024-07-05 DIAGNOSIS — B35.4 TINEA CORPORIS: ICD-10-CM

## 2024-07-05 DIAGNOSIS — N52.9 ERECTILE DYSFUNCTION, UNSPECIFIED ERECTILE DYSFUNCTION TYPE: ICD-10-CM

## 2024-07-05 DIAGNOSIS — J45.40 ASTHMA, MODERATE PERSISTENT, WELL-CONTROLLED: ICD-10-CM

## 2024-07-05 RX ORDER — SILDENAFIL 50 MG/1
50 TABLET, FILM COATED ORAL DAILY PRN
Qty: 20 TABLET | Refills: 1 | Status: SHIPPED | OUTPATIENT
Start: 2024-07-05

## 2024-07-05 RX ORDER — KETOCONAZOLE 20 MG/G
CREAM TOPICAL DAILY
Qty: 60 G | Refills: 2 | Status: SHIPPED | OUTPATIENT
Start: 2024-07-05

## 2024-07-05 RX ORDER — KETOCONAZOLE 20 MG/G
CREAM TOPICAL DAILY
Qty: 60 G | Refills: 2 | OUTPATIENT
Start: 2024-07-05

## 2024-07-05 RX ORDER — BUDESONIDE AND FORMOTEROL FUMARATE DIHYDRATE 160; 4.5 UG/1; UG/1
AEROSOL RESPIRATORY (INHALATION)
Qty: 10.2 G | Refills: 1 | Status: SHIPPED | OUTPATIENT
Start: 2024-07-05 | End: 2024-09-25

## 2024-08-22 ENCOUNTER — OFFICE VISIT (OUTPATIENT)
Dept: FAMILY MEDICINE | Facility: CLINIC | Age: 34
End: 2024-08-22
Payer: MEDICAID

## 2024-08-22 VITALS
DIASTOLIC BLOOD PRESSURE: 82 MMHG | OXYGEN SATURATION: 98 % | HEART RATE: 81 BPM | TEMPERATURE: 98.2 F | BODY MASS INDEX: 21.85 KG/M2 | SYSTOLIC BLOOD PRESSURE: 128 MMHG | WEIGHT: 139.2 LBS | HEIGHT: 67 IN

## 2024-08-22 DIAGNOSIS — A63.0 ANAL WARTS: ICD-10-CM

## 2024-08-22 DIAGNOSIS — Z11.3 SCREENING EXAMINATION FOR STI: ICD-10-CM

## 2024-08-22 DIAGNOSIS — B35.4 TINEA CORPORIS: Primary | ICD-10-CM

## 2024-08-22 DIAGNOSIS — Q25.1 AORTIC ATRESIA OR STENOSIS, CONGENITAL: ICD-10-CM

## 2024-08-22 DIAGNOSIS — Q25.29 AORTIC ATRESIA OR STENOSIS, CONGENITAL: ICD-10-CM

## 2024-08-22 LAB
ALBUMIN SERPL BCG-MCNC: 4.8 G/DL (ref 3.5–5.2)
ALP SERPL-CCNC: 58 U/L (ref 40–150)
ALT SERPL W P-5'-P-CCNC: 13 U/L (ref 0–70)
ANION GAP SERPL CALCULATED.3IONS-SCNC: 11 MMOL/L (ref 7–15)
AST SERPL W P-5'-P-CCNC: 23 U/L (ref 0–45)
BILIRUB SERPL-MCNC: 0.4 MG/DL
BUN SERPL-MCNC: 13.4 MG/DL (ref 6–20)
CALCIUM SERPL-MCNC: 9.5 MG/DL (ref 8.8–10.4)
CHLORIDE SERPL-SCNC: 103 MMOL/L (ref 98–107)
CHOLEST SERPL-MCNC: 151 MG/DL
CREAT SERPL-MCNC: 0.77 MG/DL (ref 0.67–1.17)
EGFRCR SERPLBLD CKD-EPI 2021: >90 ML/MIN/1.73M2
FASTING STATUS PATIENT QL REPORTED: NORMAL
GLUCOSE SERPL-MCNC: 81 MG/DL (ref 70–99)
HCO3 SERPL-SCNC: 25 MMOL/L (ref 22–29)
HDLC SERPL-MCNC: 61 MG/DL
HIV 1+2 AB+HIV1 P24 AG SERPL QL IA: NONREACTIVE
LDLC SERPL CALC-MCNC: 78 MG/DL
NONHDLC SERPL-MCNC: 90 MG/DL
POTASSIUM SERPL-SCNC: 4.1 MMOL/L (ref 3.4–5.3)
PROT SERPL-MCNC: 7.4 G/DL (ref 6.4–8.3)
SODIUM SERPL-SCNC: 139 MMOL/L (ref 135–145)
TRIGL SERPL-MCNC: 61 MG/DL

## 2024-08-22 PROCEDURE — 87389 HIV-1 AG W/HIV-1&-2 AB AG IA: CPT | Mod: ORL | Performed by: NURSE PRACTITIONER

## 2024-08-22 PROCEDURE — 86780 TREPONEMA PALLIDUM: CPT | Mod: ORL | Performed by: NURSE PRACTITIONER

## 2024-08-22 PROCEDURE — 80061 LIPID PANEL: CPT | Mod: ORL | Performed by: NURSE PRACTITIONER

## 2024-08-22 PROCEDURE — 80053 COMPREHEN METABOLIC PANEL: CPT | Mod: ORL | Performed by: NURSE PRACTITIONER

## 2024-08-22 PROCEDURE — 87491 CHLMYD TRACH DNA AMP PROBE: CPT | Mod: ORL | Performed by: NURSE PRACTITIONER

## 2024-08-22 RX ORDER — TERBINAFINE HYDROCHLORIDE 250 MG/1
250 TABLET ORAL DAILY
Qty: 7 TABLET | Refills: 0 | Status: SHIPPED | OUTPATIENT
Start: 2024-08-22 | End: 2024-08-29

## 2024-08-22 NOTE — PROGRESS NOTES
Called patient to schedule a screening mammogram PATIENTPHONEMESSAGE: left message.-       Today's Date: Aug 22, 2024     Patient Laci Dsouza 1990 presents to the clinic today to address   Chief Complaint   Patient presents with    Derm Problem     Fungal rash on chest    Lab Only     Std testing, symptom check             SUBJECTIVE     History of Present Illness:    34-year-old male with past medical history congenital adrenal hyperplasia, asthma, anxiety, and aortic atresia s/p repair 2001 following MI presents to discuss rash, STI testing, and anal warts.    Hx Tinea corporis- Previously responded to ketoconazole cream, however, rash has recently increased and spread across his chest. He denies systemic symptoms including fevers or chills. 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 He is currently networking to find a new position.    Hx anal warts-  Patient reports history of anal warts and has had prior anoscopy.  He believes he had HPV in the past.  Over the past month, he noticed return to the warts which he attributes to stress.  He would also like full STI panel.  He denies penile discharge or lesions. No other acute concerns/symptoms at time of exam.    Review of Systems   Constitutional, HEENT, cardiovascular, pulmonary, gi and gu systems are negative, except as otherwise noted.        Allergies   Allergen Reactions    Ceclor [Cefaclor Monohydrate]      Unknown      Sulfa Antibiotics      Unknown          Current Outpatient Medications   Medication Instructions    budesonide-formoterol (SYMBICORT) 160-4.5 MCG/ACT Inhaler INHALE 2 PUFFS BY MOUTH TWICE DAILY AND 1-2 WITH SPACER EVERY 4 HOURS FOR PERSISENT SYMPTOMS. MAY USE 2 EVERY 20 MINS FOR 3 DOSES. MAX OF 12    buPROPion (WELLBUTRIN XL) 150 mg, Oral, EVERY MORNING    fludrocortisone (FLORINEF) 0.1 mg, Oral, DAILY    hydrocortisone (CORTEF) 10 MG tablet Take 2 tablets in the morning and 1 tablet in the afternoon.  Plus extra tablets for sickness.    hydrocortisone sodium  succinate PF (SOLU-CORTEF) 100 mg, Intramuscular, ONCE PRN, Dispense as Act-O-Vial    ketoconazole (NIZORAL) 2 % external cream Topical, DAILY, X 2-3 weeks until clinical resolution    ketoconazole (NIZORAL) 2 % external shampoo Topical, TWICE WEEKLY, Apply 5 to 10 mL to wet scalp; leave on for 3 to 5 minutes and then rinse off thoroughly.    lamoTRIgine (LAMICTAL) 100 mg, Oral, DAILY    sildenafil (VIAGRA) 50 mg, Oral, DAILY PRN    Syringe Luer Lock 100 mg, Does not apply, PRN       Past Medical History:   Diagnosis Date    Anxiety     Aortic atresia or stenosis, congenital 12/2001    Repair 2001 after had MI;    Asthma, moderate persistent, well-controlled     Congenital adrenal hyperplasia (H24) 1989    Dx at 1 mo of age; mother with CAH also (?)        Family History   Adopted: Yes   Problem Relation Age of Onset    Bipolar Disorder Father         Social History     Tobacco Use    Smoking status: Former     Types: Cigarettes    Smokeless tobacco: Never   Substance Use Topics    Alcohol use: Yes    Drug use: Never        History   Sexual Activity    Sexual activity: Yes    Partners: Male            4/26/2023     3:56 PM 4/19/2024     9:27 AM 5/2/2024     4:52 PM   PHQ   PHQ-9 Total Score 7 0    0 0   Q9: Thoughts of better off dead/self-harm past 2 weeks Not at all Not at all Not at all        Immunization History   Administered Date(s) Administered    COVID-19 Bivalent 18+ (Moderna) 11/16/2022    COVID-19 MONOVALENT 12+ (Pfizer) 10/22/2021    COVID-19 Vaccine (Dipesh) 03/13/2021    HIB, Unspecified 05/17/1991    HPV Quadrivalent 06/20/2014, 08/25/2014, 12/17/2014    Hepatitis B, Peds 12/14/2000, 06/20/2001, 05/08/2002    Historical DTP/aP 1989, 01/06/1990, 02/12/1990, 05/17/1991, 07/14/1994    Influenza (IIV3) PF 11/26/1991, 10/23/1992, 10/25/1993, 12/10/1994, 12/07/1995, 11/08/1997, 10/20/1998, 10/28/1999, 10/11/2001, 10/04/2002, 09/17/2003, 10/23/2004, 12/02/2005, 11/16/2006, 08/20/2013, 10/01/2014     "Influenza Vaccine >6 months,quad, PF 10/09/2013, 10/02/2017, 10/01/2018, 10/02/2019, 10/22/2021, 09/20/2023    Influenza Vaccine, 6+MO IM (QUADRIVALENT W/PRESERVATIVES) 09/15/2016    Influenza, seasonal, injectable, PF 01/15/2009    Influenza,INJ,MDCK,PF,Quad >6mo(Flucelvax) 09/05/2020, 11/16/2022    MMR 11/02/1990, 12/14/2000    Nasal Influenza Vaccine 2-49 (FluMist) 09/29/2015    Pneumococcal 20 valent Conjugate (Prevnar 20) 05/03/2024    Pneumococcal 23 valent 01/15/2009    Polio, Unspecified 1989, 01/06/1990, 05/17/1991, 07/14/1994    TDAP (Adacel,Boostrix) 01/15/2009, 06/20/2014, 05/03/2024    Td (Adult), Adsorbed 12/14/2000                 OBJECTIVE     /82 (BP Location: Left arm, Patient Position: Sitting, Cuff Size: Adult Regular)   Pulse 81   Temp 98.2  F (36.8  C) (Oral)   Ht 1.699 m (5' 6.9\")   Wt 63.1 kg (139 lb 3.2 oz)   SpO2 98%   BMI 21.87 kg/m       Labs:  Lab Results   Component Value Date    WBC 7.5 09/22/2023    HGB 13.9 09/22/2023    HCT 41.6 09/22/2023     09/22/2023    ALT 8 09/22/2023    AST 15 09/22/2023     09/22/2023    BUN 13.3 09/22/2023    CO2 23 09/22/2023    TSH 1.49 09/22/2023    PSA 0.44 12/02/2019        Physical Exam  Constitutional:       General: He is not in acute distress.     Appearance: He is not ill-appearing.      Comments: Pt declined chaperone.   Cardiovascular:      Rate and Rhythm: Normal rate.   Pulmonary:      Effort: Pulmonary effort is normal. No respiratory distress.   Abdominal:      General: Bowel sounds are normal.      Palpations: Abdomen is soft. There is no hepatomegaly.      Tenderness: There is no abdominal tenderness.   Genitourinary:     Comments: 2 anal warts noted on inspection of rectum  Skin:     Findings: Rash present. No abscess. Rash is not papular.          Neurological:      General: No focal deficit present.      Mental Status: He is alert.   Psychiatric:         Thought Content: Thought content normal.         " Judgment: Judgment normal.               ASSESSMENT/PLAN     1. Tinea corporis  As noted in HPI. Will check CMP to monitor LFTs. Considering pt failed topical therapy, will have pt hold ketoconazole and start short course of terbinafine (held off on itraconazole considering interaction with symbicort). If lesions still present after 1 week, we can extend therapy.   - Comprehensive metabolic panel  - terbinafine (LAMISIL) 250 MG tablet; Take 1 tablet (250 mg) by mouth daily for 7 days.  Dispense: 7 tablet; Refill: 0    2. Anal warts  As noted in HPI. Anal warts noted on exam. Refer to colorectal for further eval.  - Adult Colorectal Surgery  Referral; Future    3. Screening examination for STI  - Chlamydia trachomatis/Neisseria gonorrhoeae by PCR - Clinic Collect  - HIV Antigen Antibody Combo  - Treponema Abs w Reflex to RPR and Titer    4. Aortic atresia or stenosis, congenital  - Lipid panel      Follow-Up:  - Follow up in as needed, or if symptoms worsen or fail to improve. Disposition pending results.      Options for treatment and follow-up care were reviewed with the patient. Patient engaged in the decision making process and verbalized understanding of the options discussed and agreed with the final plan.  AVS printed and given to patient.    PREM Harmon [facility]  [address] [address] [phone]        Note: Chart documentation was done in part with Dragon Voice Recognition software.  Although reviewed after completion, some word and grammatical errors may remain. Please contact author for any clarification or concerns.   You can access the FollowMyHealth Patient Portal offered by [facility] by registering at the following website: [email or web address]. By joining [facility]’s FollowMyHealth portal, you will also be able to view your health information using other applications (apps) compatible with our system.

## 2024-08-23 LAB
C TRACH DNA SPEC QL PROBE+SIG AMP: NEGATIVE
N GONORRHOEA DNA SPEC QL NAA+PROBE: NEGATIVE
T PALLIDUM AB SER QL: NONREACTIVE

## 2024-08-26 NOTE — TELEPHONE ENCOUNTER
Diagnosis, Referred by & from: Anal Warts   Appt date: 9/26/2024   NOTES STATUS DETAILS   OFFICE NOTE from referring provider Internal [facility]:  8/22/24 - PCC OV with Chemo Waters NP   OFFICE NOTE from other specialist [facility]:  3/16/20 - PT OV with Yeni Fletcher PT    [facility]:  1/22/20 - [place] OV with Kemi Gutierrez [place]    [facility] [place] - [place]CC OV with Yeni Leone NP   DISCHARGE SUMMARY from hospital N/A    DISCHARGE REPORT from the ER N/A    OPERATIVE REPORT N/A    MEDICATION LIST Internal    LABS N/A    DIAGNOSTIC PROCEDURES N/A    IMAGING (DISC & REPORT)      ULTRASOUND  (ENDOANAL/ENDORECTAL) [facility]:  12/13/21 - US Testicular/Scrotum  12/2/19 - US Testicular/Scrotum

## 2024-09-06 ENCOUNTER — OFFICE VISIT (OUTPATIENT)
Dept: FAMILY MEDICINE | Facility: CLINIC | Age: 34
End: 2024-09-06
Payer: COMMERCIAL

## 2024-09-06 VITALS
WEIGHT: 136 LBS | SYSTOLIC BLOOD PRESSURE: 109 MMHG | OXYGEN SATURATION: 98 % | BODY MASS INDEX: 21.35 KG/M2 | HEIGHT: 67 IN | DIASTOLIC BLOOD PRESSURE: 76 MMHG | TEMPERATURE: 98.2 F | RESPIRATION RATE: 18 BRPM | HEART RATE: 83 BPM

## 2024-09-06 DIAGNOSIS — B35.4 TINEA CORPORIS: Primary | ICD-10-CM

## 2024-09-06 RX ORDER — FLUCONAZOLE 150 MG/1
150 TABLET ORAL ONCE
Qty: 1 TABLET | Refills: 0 | Status: SHIPPED | OUTPATIENT
Start: 2024-09-06 | End: 2024-09-06

## 2024-09-06 ASSESSMENT — PAIN SCALES - GENERAL: PAINLEVEL: NO PAIN (0)

## 2024-09-06 NOTE — NURSING NOTE
"ROOM:2  ROSARIO SHAVER    Preferred Name: Laci     How did you hear about us?  Current Patient     Services Provided? No    34 year old  Chief Complaint   Patient presents with    Derm Problem     Rash on chest.       Blood pressure 109/76, pulse 106, temperature 98.2  F (36.8  C), temperature source Oral, resp. rate 18, height 1.689 m (5' 6.5\"), weight 61.7 kg (136 lb), SpO2 98%. Body mass index is 21.62 kg/m .  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), simple virilizing (H24)    Congenital adrenal hyperplasia (H24)    Anxiety    Aortic atresia or stenosis, congenital    Asthma, moderate persistent, well-controlled    Anal warts    Aortic stenosis    Asthma    Condylomata brien of perianal skin    Generalized anxiety disorder    Hepatitis B immune    Hyponatremia    Major depression       Wt Readings from Last 2 Encounters:   09/06/24 61.7 kg (136 lb)   08/22/24 63.1 kg (139 lb 3.2 oz)     BP Readings from Last 3 Encounters:   09/06/24 109/76   08/22/24 128/82   05/03/24 117/73       Allergies   Allergen Reactions    Ceclor [Cefaclor Monohydrate]      Unknown      Sulfa Antibiotics      Unknown         Current Outpatient Medications   Medication Sig Dispense Refill    budesonide-formoterol (SYMBICORT) 160-4.5 MCG/ACT Inhaler INHALE 2 PUFFS BY MOUTH TWICE DAILY AND 1-2 WITH SPACER EVERY 4 HOURS FOR PERSISENT SYMPTOMS. MAY USE 2 EVERY 20 MINS FOR 3 DOSES. MAX OF 12 10.2 g 1    buPROPion (WELLBUTRIN XL) 150 MG 24 hr tablet Take 150 mg by mouth every morning      fludrocortisone (FLORINEF) 0.1 MG tablet Take 1 tablet (0.1 mg) by mouth daily 90 tablet 3    hydrocortisone (CORTEF) 10 MG tablet Take 2 tablets in the morning and 1 tablet in the afternoon.  Plus extra tablets for sickness. 300 tablet 2    hydrocortisone sodium succinate PF (SOLU-CORTEF) injection Inject 2 mLs (100 mg) into the muscle " "once as needed Dispense as Act-O-Vial 2 mL 1    ketoconazole (NIZORAL) 2 % external cream Apply topically daily X 2-3 weeks until clinical resolution 60 g 2    ketoconazole (NIZORAL) 2 % external shampoo Apply topically twice a week Apply 5 to 10 mL to wet scalp; leave on for 3 to 5 minutes and then rinse off thoroughly. 120 mL 3    lamoTRIgine (LAMICTAL) 100 MG tablet Take 100 mg by mouth daily      sildenafil (VIAGRA) 50 MG tablet Take 1 tablet (50 mg) by mouth daily as needed (ED) 20 tablet 1    Syringe/Needle, Disp, (SYRINGE LUER LOCK) 23G X 1\" 3 ML MISC 100 mg as needed 3 each 3     No current facility-administered medications for this visit.       Social History     Tobacco Use    Smoking status: Former     Types: Cigarettes    Smokeless tobacco: Never   Vaping Use    Vaping status: Never Used   Substance Use Topics    Alcohol use: Yes     Comment: socially    Drug use: Never       Honoring Choices - Health Care Directive Guide offered to patient at time of visit.    Health Maintenance Due   Topic Date Due    ADVANCE CARE PLANNING  Never done    HEPATITIS A IMMUNIZATION (1 of 2 - Risk 2-dose series) Never done    INFLUENZA VACCINE (1) 09/01/2024    COVID-19 Vaccine (4 - 2023-24 season) 09/01/2024       Immunization History   Administered Date(s) Administered    COVID-19 Bivalent 18+ (Moderna) 11/16/2022    COVID-19 MONOVALENT 12+ (Pfizer) 10/22/2021    COVID-19 Vaccine (Dipesh) 03/13/2021    HIB, Unspecified 05/17/1991    HPV Quadrivalent 06/20/2014, 08/25/2014, 12/17/2014    Hepatitis B, Peds 12/14/2000, 06/20/2001, 05/08/2002    Historical DTP/aP 1989, 01/06/1990, 02/12/1990, 05/17/1991, 07/14/1994    Influenza (IIV3) PF 11/26/1991, 10/23/1992, 10/25/1993, 12/10/1994, 12/07/1995, 11/08/1997, 10/20/1998, 10/28/1999, 10/11/2001, 10/04/2002, 09/17/2003, 10/23/2004, 12/02/2005, 11/16/2006, 08/20/2013, 10/01/2014    Influenza Vaccine >6 months,quad, PF 10/09/2013, 10/02/2017, 10/01/2018, 10/02/2019, " "10/22/2021, 09/20/2023    Influenza Vaccine, 6+MO IM (QUADRIVALENT W/PRESERVATIVES) 09/15/2016    Influenza, seasonal, injectable, PF 01/15/2009    Influenza,INJ,MDCK,PF,Quad >6mo(Flucelvax) 09/05/2020, 11/16/2022    MMR 11/02/1990, 12/14/2000    Nasal Influenza Vaccine 2-49 (FluMist) 09/29/2015    Pneumococcal 20 valent Conjugate (Prevnar 20) 05/03/2024    Pneumococcal 23 valent 01/15/2009    Polio, Unspecified 1989, 01/06/1990, 05/17/1991, 07/14/1994    TDAP (Adacel,Boostrix) 01/15/2009, 06/20/2014, 05/03/2024    Td (Adult), Adsorbed 12/14/2000       No results found for: \"PAP\"    Recent Labs   Lab Test 08/22/24  1106 09/22/23  0914 12/22/21  0805 12/13/21  1733 12/13/21  1733 11/19/20  1610 12/26/19  0727   A1C  --  5.1  --   --   --   --   --    LDL 78  --   --   --   --   --   --    HDL 61  --   --   --   --   --   --    TRIG 61  --   --   --   --   --   --    ALT 13 8  --   --  30  --  34   CR 0.77 0.78  --    < > 0.70 0.76 0.71   GFRESTIMATED >90 >90  --    < > >90 >90 >90   GFRESTBLACK  --   --   --   --   --  >90 >90   ALBUMIN 4.8 4.6  --   --  3.8  --  3.8   POTASSIUM 4.1 3.9  --   --  3.6 3.7 4.2   TSH  --  1.49 0.29*  --   --   --   --     < > = values in this interval not displayed.           9/6/2024     9:41 AM 6/20/2022     8:44 AM   PHQ-2 ( 1999 Pfizer)   Q1: Little interest or pleasure in doing things 0 1   Q2: Feeling down, depressed or hopeless 0 1   PHQ-2 Score 0 2   Q1: Little interest or pleasure in doing things Not at all    Q2: Feeling down, depressed or hopeless Not at all    PHQ-2 Score 0            12/20/2021    11:07 AM 4/26/2023     3:56 PM 4/19/2024     9:27 AM 5/2/2024     4:52 PM   PHQ-9 SCORE   PHQ-9 Total Score MyChart  7 (Mild depression) 0 0   PHQ-9 Total Score 12 7 0    0 0           4/26/2023     3:56 PM 4/19/2024     9:27 AM 5/2/2024     4:52 PM   KENNY-7 SCORE   Total Score 7 (mild anxiety) 7 (mild anxiety) 7 (mild anxiety)   Total Score 7 7    7 7           12/14/2023    "  3:57 PM 4/19/2024     9:28 AM 5/2/2024     4:52 PM   ACT Total Scores   ACT TOTAL SCORE (Goal Greater than or Equal to 20) 25 25    25 25   In the past 12 months, how many times did you visit the emergency room for your asthma without being admitted to the hospital? 0 0 0   In the past 12 months, how many times were you hospitalized overnight because of your asthma? 0 0 0       Bárbara Kiser    September 6, 2024 9:49 AM

## 2024-09-06 NOTE — Clinical Note
Hi Dr. Warner and team,  I saw Laci today for follow-up of his tinea corporis. He did not have much improvement with terbinafine so I have referred him to Dermatology and started a 2 week course of fluconazole. Given fluconazole's CY inhibition, I wanted to reach out to your team to see if any dose adjustments needed to be made with his corticosteroids.  Thanks, Damon SEGAL Today is my last day at this clinic before I join another clinic in [place], however, please feel free to call me at [phone] with any concerns/directions and I can relay them to the providers that will remain at this clinic until it closes on 10/31/24.

## 2024-09-06 NOTE — PROGRESS NOTES
Today's Date: Sep 6, 2024     Patient Laci Dsouza 1990 presents to the clinic today to address   Chief Complaint   Patient presents with    Derm Problem     Rash on chest.             SUBJECTIVE     History of Present Illness:    34-year-old male with past medical history of congenital adrenal hyperplasia, asthma, anxiety, and aortic atresia s/p repair 2001 following MI presents to discuss rash.  Briefly, patient was seen by me on 8/22/2024 for refractory tenia corporis.  Since the patient failed topical therapy, we started a short course of terbinafine.  Patient reports that he has did notice significant improvement with terbinafine, however, he did suffer from GI side effects (upset stomach and diarrhea) that have since resolved.  He admits to feeling frustrated as he believes the rash is getting worse. No other acute concerns/symptoms at time of exam.    Review of Systems   Constitutional, HEENT, cardiovascular, pulmonary, gi and gu systems are negative, except as otherwise noted.      Allergies   Allergen Reactions    Ceclor [Cefaclor Monohydrate]      Unknown      Sulfa Antibiotics      Unknown          Current Outpatient Medications   Medication Instructions    budesonide-formoterol (SYMBICORT) 160-4.5 MCG/ACT Inhaler INHALE 2 PUFFS BY MOUTH TWICE DAILY AND 1-2 WITH SPACER EVERY 4 HOURS FOR PERSISENT SYMPTOMS. MAY USE 2 EVERY 20 MINS FOR 3 DOSES. MAX OF 12    buPROPion (WELLBUTRIN XL) 150 mg, Oral, EVERY MORNING    fludrocortisone (FLORINEF) 0.1 mg, Oral, DAILY    hydrocortisone (CORTEF) 10 MG tablet Take 2 tablets in the morning and 1 tablet in the afternoon.  Plus extra tablets for sickness.    hydrocortisone sodium succinate PF (SOLU-CORTEF) 100 mg, Intramuscular, ONCE PRN, Dispense as Act-O-Vial    ketoconazole (NIZORAL) 2 % external cream Topical, DAILY, X 2-3 weeks until clinical resolution    ketoconazole (NIZORAL) 2 % external shampoo Topical, TWICE WEEKLY, Apply 5 to 10 mL to wet scalp; leave  on for 3 to 5 minutes and then rinse off thoroughly.    lamoTRIgine (LAMICTAL) 100 mg, Oral, DAILY    sildenafil (VIAGRA) 50 mg, Oral, DAILY PRN    Syringe Luer Lock 100 mg, Does not apply, PRN       Past Medical History:   Diagnosis Date    Anxiety     Aortic atresia or stenosis, congenital 12/2001    Repair 2001 after had MI;    Asthma, moderate persistent, well-controlled     Congenital adrenal hyperplasia (H24) 1989    Dx at 1 mo of age; mother with CAH also (?)        Family History   Adopted: Yes   Problem Relation Age of Onset    Bipolar Disorder Father         Social History     Tobacco Use    Smoking status: Former     Types: Cigarettes    Smokeless tobacco: Never   Vaping Use    Vaping status: Never Used   Substance Use Topics    Alcohol use: Yes     Comment: socially    Drug use: Never        History   Sexual Activity    Sexual activity: Yes    Partners: Male            4/26/2023     3:56 PM 4/19/2024     9:27 AM 5/2/2024     4:52 PM   PHQ   PHQ-9 Total Score 7 0    0 0   Q9: Thoughts of better off dead/self-harm past 2 weeks Not at all Not at all Not at all        Immunization History   Administered Date(s) Administered    COVID-19 Bivalent 18+ (Moderna) 11/16/2022    COVID-19 MONOVALENT 12+ (Pfizer) 10/22/2021    COVID-19 Vaccine (Dipesh) 03/13/2021    HIB, Unspecified 05/17/1991    HPV Quadrivalent 06/20/2014, 08/25/2014, 12/17/2014    Hepatitis B, Peds 12/14/2000, 06/20/2001, 05/08/2002    Historical DTP/aP 1989, 01/06/1990, 02/12/1990, 05/17/1991, 07/14/1994    Influenza (IIV3) PF 11/26/1991, 10/23/1992, 10/25/1993, 12/10/1994, 12/07/1995, 11/08/1997, 10/20/1998, 10/28/1999, 10/11/2001, 10/04/2002, 09/17/2003, 10/23/2004, 12/02/2005, 11/16/2006, 08/20/2013, 10/01/2014    Influenza Vaccine >6 months,quad, PF 10/09/2013, 10/02/2017, 10/01/2018, 10/02/2019, 10/22/2021, 09/20/2023    Influenza Vaccine, 6+MO IM (QUADRIVALENT W/PRESERVATIVES) 09/15/2016    Influenza, seasonal, injectable, PF  "01/15/2009    Influenza,INJ,MDCK,PF,Quad >6mo(Flucelvax) 09/05/2020, 11/16/2022    MMR 11/02/1990, 12/14/2000    Nasal Influenza Vaccine 2-49 (FluMist) 09/29/2015    Pneumococcal 20 valent Conjugate (Prevnar 20) 05/03/2024    Pneumococcal 23 valent 01/15/2009    Polio, Unspecified 1989, 01/06/1990, 05/17/1991, 07/14/1994    TDAP (Adacel,Boostrix) 01/15/2009, 06/20/2014, 05/03/2024    Td (Adult), Adsorbed 12/14/2000                 OBJECTIVE     /76 (BP Location: Left arm, Patient Position: Sitting, Cuff Size: Adult Regular)   Pulse 83   Temp 98.2  F (36.8  C) (Oral)   Resp 18   Ht 1.689 m (5' 6.5\")   Wt 61.7 kg (136 lb)   SpO2 98%   BMI 21.62 kg/m        Labs:  Lab Results   Component Value Date    WBC 7.5 09/22/2023    HGB 13.9 09/22/2023    HCT 41.6 09/22/2023     09/22/2023    CHOL 151 08/22/2024    TRIG 61 08/22/2024    HDL 61 08/22/2024    ALT 13 08/22/2024    AST 23 08/22/2024     08/22/2024    BUN 13.4 08/22/2024    CO2 25 08/22/2024    TSH 1.49 09/22/2023    PSA 0.44 12/02/2019        Physical Exam  Constitutional:       General: He is not in acute distress.     Appearance: He is not ill-appearing.   Cardiovascular:      Rate and Rhythm: Normal rate and regular rhythm.      Heart sounds: Normal heart sounds. No murmur heard.  Pulmonary:      Effort: Pulmonary effort is normal. No respiratory distress.      Breath sounds: Normal breath sounds. No wheezing or rales.   Abdominal:      General: Bowel sounds are normal.      Palpations: Abdomen is soft.      Tenderness: There is no abdominal tenderness. There is no guarding.   Musculoskeletal:      Cervical back: Neck supple.   Lymphadenopathy:      Cervical: No cervical adenopathy.   Skin:     Findings: No abscess.          Neurological:      General: No focal deficit present.      Mental Status: He is alert.   Psychiatric:         Thought Content: Thought content normal.         Judgment: Judgment normal.               " ASSESSMENT/PLAN     1. Tinea corporis    As noted in HPI.  The patient admits to feeling frustrated which is quite understandable.  He is currently afebrile and in no acute distress.  Physical exam demonstrates annular lesions to the chest, largest 9 cm x 6 cm.    We discussed Derm referral and/or trial of oral Azole.  After discussion, patient requested both.  Derm referral was placed.  Will start 2-week course of fluconazole.  Since fluconazole is a CY inhibitor, we will have the patient decrease his Symbicort to 1 puff twice daily while on this medication.  Additionally, will route this note to his endocrinology team to inform them of the current treatment plan/inquire as to whether his corticosteroids need adjustment.    Addendum: Per Endo, no dose adjustment needed for corticosteroids as the therapy is only 2 weeks. Notified Laci via telephone call at 12:44pm 24    - Adult Dermatology  Referral  - fluconazole (DIFLUCAN) 150 MG tablet; Take 1 tablet (150 mg) by mouth once for 1 dose.  Dispense: 1 tablet; Refill: 0          Follow-Up:  - Follow up in as needed, or if symptoms worsen or fail to improve.     Options for treatment and follow-up care were reviewed with the patient. Patient engaged in the decision making process and verbalized understanding of the options discussed and agreed with the final plan.  AVS printed and given to patient.    PREM Harmon [facility]  [address] [place] [id number]  [phone]        Note: Chart documentation was done in part with Dragon Voice Recognition software.  Although reviewed after completion, some word and grammatical errors may remain. Please contact author for any clarification or concerns.

## 2024-09-09 ENCOUNTER — TELEPHONE (OUTPATIENT)
Dept: DERMATOLOGY | Facility: CLINIC | Age: 34
End: 2024-09-09
Payer: COMMERCIAL

## 2024-09-09 NOTE — TELEPHONE ENCOUNTER
Called pt to discuss referral and assist in scheduling. No answer. LVM to call back.  Chyna Cook RN

## 2024-09-09 NOTE — TELEPHONE ENCOUNTER
This encounter is being sent to inform the clinic that this patient has a referral from Chemo Waters APRN CNP in [facility] for the diagnoses of Tinea corporis; Rash; Refractory tinea corporis and has requested that this patient be seen within Priority: 1-2 Weeks and/or with Priority: 1-2 Weeks. Based on the availability of our provider(s), we are unable to accommodate this request.    Were all sites offered this patient?  Yes  Pt requesting [facility] location only please due to where he lives  Does scheduling algorithm request to schedule next available?  Patient has been scheduled for the first available opening with Chloe Gamble PA-C at [facility] on 04/21/25.  We have informed the patient that the clinic will review their referral and reach out if a sooner appointment is medically necessary.        Referral Order in Epic  Records in Epic  No outside Records    Please review and reach out to Pt Laci to move up his Appt - Thank you!

## 2024-09-13 ENCOUNTER — TELEPHONE (OUTPATIENT)
Dept: FAMILY MEDICINE | Facility: CLINIC | Age: 34
End: 2024-09-13

## 2024-09-13 NOTE — TELEPHONE ENCOUNTER
Patient called and stated that he was supposed to get two weeks of fluconazole from [facility] but he only received one dose. I checked the medications tab and verified that only one dose was sent in. He saw damon on 9/6 for a rash and was started on a two week course of fluconazole. Patient is wondering if Lucia can send in the rest of the medication so he can complete his course.     I did confirm in Damon's progress note that the fluconazole was supposed to be two weeks.   [facility] on [address] in [place]

## 2024-09-16 ENCOUNTER — TELEPHONE (OUTPATIENT)
Dept: FAMILY MEDICINE | Facility: CLINIC | Age: 34
End: 2024-09-16

## 2024-09-16 DIAGNOSIS — B35.4 TINEA CORPORIS: Primary | ICD-10-CM

## 2024-09-16 RX ORDER — FLUCONAZOLE 150 MG/1
150 TABLET ORAL ONCE
Qty: 1 TABLET | Refills: 0 | Status: SHIPPED | OUTPATIENT
Start: 2024-09-16 | End: 2024-09-16

## 2024-09-16 NOTE — CONFIDENTIAL NOTE
Patient called requesting fluconazole (DIFLUCAN) 150 MG tablet to be sent to   [id number] - [place], [place] - [address] AT [facility] [address]   For a 14 day supply not a 1 day supply. It is in [facility] Elizabeth's note. The pt proceeded to say they would like to file a complaint because this is the second time this has happened so writer transferred patient to personal relations.    Cherie DURANT CMA 2:10 PM 9/16/2024

## 2024-09-16 NOTE — TELEPHONE ENCOUNTER
Laci called to say he thought the dose of diflucan he received was not enough.  It looks like on 9/6/24 he was prescribed a 2 week course of diflucan, 150 mg once/week for 2 weeks.  The second dose was not sent in with the first dose.      Laci was under the impression that he should be taking something each day for 2 weeks.  He had taken terfenadine 25 mg daily for one week without results previously, he returned to clinic then he was prescribed diflucan which is taken very differently.    Laci understands now how to take the diflucan, he is wondering why the second dose was not sent in and why the instructions were not clear.  He has an appointment with dermatology tomorrow.

## 2024-09-17 ENCOUNTER — OFFICE VISIT (OUTPATIENT)
Dept: DERMATOLOGY | Facility: CLINIC | Age: 34
End: 2024-09-17
Attending: NURSE PRACTITIONER
Payer: COMMERCIAL

## 2024-09-17 DIAGNOSIS — D49.2 NEOPLASM OF UNSPECIFIED BEHAVIOR OF BONE, SOFT TISSUE, AND SKIN: Primary | ICD-10-CM

## 2024-09-17 PROCEDURE — 88305 TISSUE EXAM BY PATHOLOGIST: CPT | Mod: 26 | Performed by: DERMATOLOGY

## 2024-09-17 PROCEDURE — 88305 TISSUE EXAM BY PATHOLOGIST: CPT | Mod: TC | Performed by: PHYSICIAN ASSISTANT

## 2024-09-17 PROCEDURE — 11102 TANGNTL BX SKIN SINGLE LES: CPT | Performed by: PHYSICIAN ASSISTANT

## 2024-09-17 PROCEDURE — 88312 SPECIAL STAINS GROUP 1: CPT | Mod: 26 | Performed by: DERMATOLOGY

## 2024-09-17 PROCEDURE — 99203 OFFICE O/P NEW LOW 30 MIN: CPT | Mod: 25 | Performed by: PHYSICIAN ASSISTANT

## 2024-09-17 ASSESSMENT — PAIN SCALES - GENERAL: PAINLEVEL: NO PAIN (0)

## 2024-09-17 NOTE — NURSING NOTE
Dermatology Rooming Note    Laci Dsouza's goals for this visit include:   Chief Complaint   Patient presents with    Rash     Refractory Tinea Corporis that began several months ago. H/o ketoconazole cream and oral fluconazole.     Lalo CARDOZO CMA

## 2024-09-17 NOTE — PROGRESS NOTES
[facility] Dermatology Note  Encounter Date: Sep 17, 2024  Office Visit     Reviewed patients past medical history and pertinent chart review prior to patients visit today.     Dermatology Problem List:  ***    ____________________________________________    Assessment & Plan:     ***    All risks, benefits and alternatives were discussed with patient.  Patient is in agreement and understands the assessment and plan.  All questions were answered.  DARIEL Reed [facility] Dermatology  _______________________________________    CC: Rash (Refractory Tinea Corporis that began several months ago. H/o ketoconazole cream and oral fluconazole.)    HPI:  Mr. Laci Dsouza is a(n) 34 year old male who presents today as a new patient for ***. Patient is otherwise feeling well, without additional skin concerns.    The rash is itchy. The rash comes and goes. At one point this resovled with ketoconazole and returned. *** He reports a history of asthma. ***    Physical Exam:  SKIN: {kkSkinExam:209914}  ***    - No other lesions of concern on areas examined.     Medications:  Current Outpatient Medications   Medication Sig Dispense Refill    budesonide-formoterol (SYMBICORT) 160-4.5 MCG/ACT Inhaler INHALE 2 PUFFS BY MOUTH TWICE DAILY AND 1-2 WITH SPACER EVERY 4 HOURS FOR PERSISENT SYMPTOMS. MAY USE 2 EVERY 20 MINS FOR 3 DOSES. MAX OF 12 10.2 g 1    buPROPion (WELLBUTRIN XL) 150 MG 24 hr tablet Take 150 mg by mouth every morning      fludrocortisone (FLORINEF) 0.1 MG tablet Take 1 tablet (0.1 mg) by mouth daily 90 tablet 3    hydrocortisone (CORTEF) 10 MG tablet Take 2 tablets in the morning and 1 tablet in the afternoon.  Plus extra tablets for sickness. 300 tablet 2    hydrocortisone sodium succinate PF (SOLU-CORTEF) injection Inject 2 mLs (100 mg) into the muscle once as needed Dispense as Act-O-Vial 2 mL 1    ketoconazole (NIZORAL) 2 % external shampoo Apply topically twice a week Apply 5 to  "10 mL to wet scalp; leave on for 3 to 5 minutes and then rinse off thoroughly. 120 mL 3    lamoTRIgine (LAMICTAL) 100 MG tablet Take 100 mg by mouth daily      sildenafil (VIAGRA) 50 MG tablet Take 1 tablet (50 mg) by mouth daily as needed (ED) 20 tablet 1    Syringe/Needle, Disp, (SYRINGE LUER LOCK) 23G X 1\" 3 ML MISC 100 mg as needed 3 each 3    ketoconazole (NIZORAL) 2 % external cream Apply topically daily X 2-3 weeks until clinical resolution (Patient not taking: Reported on 9/17/2024) 60 g 2     No current facility-administered medications for this visit.      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), simple virilizing (H24)    Congenital adrenal hyperplasia (H24)    Anxiety    Aortic atresia or stenosis, congenital    Asthma, moderate persistent, well-controlled    Anal warts    Aortic stenosis    Asthma    Condylomata brien of perianal skin    Generalized anxiety disorder    Hepatitis B immune    Hyponatremia    Major depression     Past Medical History:   Diagnosis Date    Anxiety     Aortic atresia or stenosis, congenital 12/2001    Repair 2001 after had MI;    Asthma, moderate persistent, well-controlled     Congenital adrenal hyperplasia (H24) 1989    Dx at 1 mo of age; mother with CAH also (?)       CC Chemo Waters, APRN CNP  [address] [address] on close of this encounter.   "

## 2024-09-17 NOTE — PROGRESS NOTES
Lidocaine-epinephrine 1-1:482483 % injection   0.8mL once for one use, starting 9/17/2024 ending 9/17/2024,  2mL disp, R-0, injection  Injected by MELYSSA Marsh

## 2024-09-17 NOTE — TELEPHONE ENCOUNTER
Called patient to make sure his concerns have been addressed and if he had any other concerns. Patient did not answer. LVM

## 2024-09-17 NOTE — TELEPHONE ENCOUNTER
Patient called back. I confirmed that he had a nice conversation with Lucia and his concerns were resolved. He did state that he was frustrated with how the last few weeks have gone in regards to his concern went. I did apologize and told him to reach back out to me if he had anymore concerns in the future.     Rossana Colón, [facility]

## 2024-09-17 NOTE — PROGRESS NOTES
[facility] Dermatology Note  Encounter Date: Sep 17, 2024  Office Visit     Reviewed patients past medical history and pertinent chart review prior to patients visit today.     Dermatology Problem List:  # Rash, chest  - Ddx tinea corporis vs granuloma annulare vs cutaneous lupus erythematosus  - bx 9/17/2024     ____________________________________________    Assessment & Plan:     # Rash, chest  Ddx tinea corporis vs granuloma annulare vs cutaneous lupus erythematosus  - KOH performed, negative   - We reviewed possible diagnoses. I recommended a biopsy and the patient was agreeable.     Procedure Note: Biopsy by shave technique  The risks and benefits of the procedure were described to the patient. These include but are not limited to bleeding, infection, scar, incomplete removal, and non-diagnostic biopsy. Verbal informed consent was obtained. The above site(s) was cleansed with an alcohol pad and injected with 1% lidocaine with epinephrine. Once anesthesia was obtained, a biopsy(ies) was performed with Gilette blade. The tissue(s) was placed in a labeled container(s) with formalin and sent to pathology. Hemostasis was achieved with aluminum chloride. Vaseline and a bandage were applied to the wound(s). The patient tolerated the procedure well and was given post biopsy care instructions.    Follow up as needed.     All risks, benefits and alternatives were discussed with patient.  Patient is in agreement and understands the assessment and plan.  All questions were answered.  Carlota Monsivais PA-C  [facility] Dermatology  _______________________________________    CC: Rash (Refractory Tinea Corporis that began several months ago. H/o ketoconazole cream and oral fluconazole.)    HPI:  Mr. Laci Dsouza is a(n) 34 year old male who presents today as a new patient for rash on the chest. This began months ago and tends to come and go. The rash initially resolved with ketoconazole cream, but  "later returned.  Subsequent round of ketoconazole cream did not provide improvement.  No resolution with oral fluconazole.  The patient reports that the rash is quite itchy.  He denies any prior history of similar rashes, eczema, or psoriasis.  No family history of similar rashes.  Patient is otherwise feeling well, without additional skin concerns.      Physical Exam:  SKIN: Focused examination of chest was performed.  - The chest demonstrates a well demarcated patch with erythematous, serpiginous, scaly boarder. Few satellite papules.      - No other lesions of concern on areas examined.     Medications:  Current Outpatient Medications   Medication Sig Dispense Refill    budesonide-formoterol (SYMBICORT) 160-4.5 MCG/ACT Inhaler INHALE 2 PUFFS BY MOUTH TWICE DAILY AND 1-2 WITH SPACER EVERY 4 HOURS FOR PERSISENT SYMPTOMS. MAY USE 2 EVERY 20 MINS FOR 3 DOSES. MAX OF 12 10.2 g 1    buPROPion (WELLBUTRIN XL) 150 MG 24 hr tablet Take 150 mg by mouth every morning      fludrocortisone (FLORINEF) 0.1 MG tablet Take 1 tablet (0.1 mg) by mouth daily 90 tablet 3    hydrocortisone (CORTEF) 10 MG tablet Take 2 tablets in the morning and 1 tablet in the afternoon.  Plus extra tablets for sickness. 300 tablet 2    hydrocortisone sodium succinate PF (SOLU-CORTEF) injection Inject 2 mLs (100 mg) into the muscle once as needed Dispense as Act-O-Vial 2 mL 1    ketoconazole (NIZORAL) 2 % external shampoo Apply topically twice a week Apply 5 to 10 mL to wet scalp; leave on for 3 to 5 minutes and then rinse off thoroughly. 120 mL 3    lamoTRIgine (LAMICTAL) 100 MG tablet Take 100 mg by mouth daily      sildenafil (VIAGRA) 50 MG tablet Take 1 tablet (50 mg) by mouth daily as needed (ED) 20 tablet 1    Syringe/Needle, Disp, (SYRINGE LUER LOCK) 23G X 1\" 3 ML MISC 100 mg as needed 3 each 3    ketoconazole (NIZORAL) 2 % external cream Apply topically daily X 2-3 weeks until clinical resolution (Patient not taking: Reported on 9/17/2024) 60 " g 2     No current facility-administered medications for this visit.      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), simple virilizing (H24)    Congenital adrenal hyperplasia (H24)    Anxiety    Aortic atresia or stenosis, congenital    Asthma, moderate persistent, well-controlled    Anal warts    Aortic stenosis    Asthma    Condylomata brien of perianal skin    Generalized anxiety disorder    Hepatitis B immune    Hyponatremia    Major depression     Past Medical History:   Diagnosis Date    Anxiety     Aortic atresia or stenosis, congenital 12/2001    Repair 2001 after had MI;    Asthma, moderate persistent, well-controlled     Congenital adrenal hyperplasia (H24) 1989    Dx at 1 mo of age; mother with CAH also (?)       CC Chemo Waters, APRN CNP  [address] [place] [address] on close of this encounter.

## 2024-09-17 NOTE — LETTER
9/17/2024       RE: Laci Dsouza  [address]  [place] [address]     Dear Colleague,    Thank you for referring your patient, Laci Dsouza, to the [facility] [place] at [facility]. Please see a copy of my visit note below.    [facility] Dermatology Note  Encounter Date: Sep 17, 2024  Office Visit     Reviewed patients past medical history and pertinent chart review prior to patients visit today.     Dermatology Problem List:  # Rash, chest  - Ddx tinea corporis vs granuloma annulare vs cutaneous lupus erythematosus  - bx 9/17/2024     ____________________________________________    Assessment & Plan:     # Rash, chest  Ddx tinea corporis vs granuloma annulare vs cutaneous lupus erythematosus  - KOH performed, negative   - We reviewed possible diagnoses. I recommended a biopsy and the patient was agreeable.     Procedure Note: Biopsy by shave technique  The risks and benefits of the procedure were described to the patient. These include but are not limited to bleeding, infection, scar, incomplete removal, and non-diagnostic biopsy. Verbal informed consent was obtained. The above site(s) was cleansed with an alcohol pad and injected with 1% lidocaine with epinephrine. Once anesthesia was obtained, a biopsy(ies) was performed with Gilette blade. The tissue(s) was placed in a labeled container(s) with formalin and sent to pathology. Hemostasis was achieved with aluminum chloride. Vaseline and a bandage were applied to the wound(s). The patient tolerated the procedure well and was given post biopsy care instructions.    Follow up as needed.     All risks, benefits and alternatives were discussed with patient.  Patient is in agreement and understands the assessment and plan.  All questions were answered.  Carlota Monsivais PA-C  [facility] Dermatology  _______________________________________    CC: Rash  (Refractory Tinea Corporis that began several months ago. H/o ketoconazole cream and oral fluconazole.)    HPI:  Mr. Laci Dsouza is a(n) 34 year old male who presents today as a new patient for rash on the chest. This began months ago and tends to come and go. The rash initially resolved with ketoconazole cream, but later returned.  Subsequent round of ketoconazole cream did not provide improvement.  No resolution with oral fluconazole.  The patient reports that the rash is quite itchy.  He denies any prior history of similar rashes, eczema, or psoriasis.  No family history of similar rashes.  Patient is otherwise feeling well, without additional skin concerns.      Physical Exam:  SKIN: Focused examination of chest was performed.  - The chest demonstrates a well demarcated patch with erythematous, serpiginous, scaly boarder. Few satellite papules.      - No other lesions of concern on areas examined.     Medications:  Current Outpatient Medications   Medication Sig Dispense Refill     budesonide-formoterol (SYMBICORT) 160-4.5 MCG/ACT Inhaler INHALE 2 PUFFS BY MOUTH TWICE DAILY AND 1-2 WITH SPACER EVERY 4 HOURS FOR PERSISENT SYMPTOMS. MAY USE 2 EVERY 20 MINS FOR 3 DOSES. MAX OF 12 10.2 g 1     buPROPion (WELLBUTRIN XL) 150 MG 24 hr tablet Take 150 mg by mouth every morning       fludrocortisone (FLORINEF) 0.1 MG tablet Take 1 tablet (0.1 mg) by mouth daily 90 tablet 3     hydrocortisone (CORTEF) 10 MG tablet Take 2 tablets in the morning and 1 tablet in the afternoon.  Plus extra tablets for sickness. 300 tablet 2     hydrocortisone sodium succinate PF (SOLU-CORTEF) injection Inject 2 mLs (100 mg) into the muscle once as needed Dispense as Act-O-Vial 2 mL 1     ketoconazole (NIZORAL) 2 % external shampoo Apply topically twice a week Apply 5 to 10 mL to wet scalp; leave on for 3 to 5 minutes and then rinse off thoroughly. 120 mL 3     lamoTRIgine (LAMICTAL) 100 MG tablet Take 100 mg by mouth daily        "sildenafil (VIAGRA) 50 MG tablet Take 1 tablet (50 mg) by mouth daily as needed (ED) 20 tablet 1     Syringe/Needle, Disp, (SYRINGE LUER LOCK) 23G X 1\" 3 ML MISC 100 mg as needed 3 each 3     ketoconazole (NIZORAL) 2 % external cream Apply topically daily X 2-3 weeks until clinical resolution (Patient not taking: Reported on 9/17/2024) 60 g 2     No current facility-administered medications for this visit.      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), simple virilizing (H24)     Congenital adrenal hyperplasia (H24)     Anxiety     Aortic atresia or stenosis, congenital     Asthma, moderate persistent, well-controlled     Anal warts     Aortic stenosis     Asthma     Condylomata brien of perianal skin     Generalized anxiety disorder     Hepatitis B immune     Hyponatremia     Major depression     Past Medical History:   Diagnosis Date     Anxiety      Aortic atresia or stenosis, congenital 12/2001    Repair 2001 after had MI;     Asthma, moderate persistent, well-controlled      Congenital adrenal hyperplasia (H24) 1989    Dx at 1 mo of age; mother with CAH also (?)       CC Chemo Waters, APRN CNP  [address] [address] on close of this encounter.     Lidocaine-epinephrine 1-1:234555 % injection   0.8mL once for one use, starting 9/17/2024 ending 9/17/2024,  2mL disp, R-0, injection  Injected by MELYSSA Marsh      Again, thank you for allowing me to participate in the care of your patient.      Sincerely,    Carlota Monsivais PA-C    "

## 2024-09-23 DIAGNOSIS — L20.84 INTRINSIC ECZEMA: Primary | ICD-10-CM

## 2024-09-23 DIAGNOSIS — J45.40 ASTHMA, MODERATE PERSISTENT, WELL-CONTROLLED: ICD-10-CM

## 2024-09-23 LAB
PATH REPORT.COMMENTS IMP SPEC: NORMAL
PATH REPORT.FINAL DX SPEC: NORMAL
PATH REPORT.GROSS SPEC: NORMAL
PATH REPORT.MICROSCOPIC SPEC OTHER STN: NORMAL
PATH REPORT.RELEVANT HX SPEC: NORMAL

## 2024-09-23 RX ORDER — TRIAMCINOLONE ACETONIDE 1 MG/G
OINTMENT TOPICAL
Qty: 30 G | Refills: 2 | Status: SHIPPED | OUTPATIENT
Start: 2024-09-23

## 2024-09-25 RX ORDER — BUDESONIDE AND FORMOTEROL FUMARATE DIHYDRATE 160; 4.5 UG/1; UG/1
AEROSOL RESPIRATORY (INHALATION)
Qty: 10.2 G | Refills: 1 | Status: SHIPPED | OUTPATIENT
Start: 2024-09-25

## 2024-09-26 ENCOUNTER — PRE VISIT (OUTPATIENT)
Dept: SURGERY | Facility: CLINIC | Age: 34
End: 2024-09-26

## 2024-09-26 ENCOUNTER — OFFICE VISIT (OUTPATIENT)
Dept: SURGERY | Facility: CLINIC | Age: 34
End: 2024-09-26
Attending: NURSE PRACTITIONER
Payer: COMMERCIAL

## 2024-09-26 VITALS
BODY MASS INDEX: 21.69 KG/M2 | HEIGHT: 67 IN | SYSTOLIC BLOOD PRESSURE: 118 MMHG | OXYGEN SATURATION: 99 % | HEART RATE: 86 BPM | DIASTOLIC BLOOD PRESSURE: 65 MMHG | WEIGHT: 138.2 LBS

## 2024-09-26 DIAGNOSIS — A63.0 ANAL WARTS: ICD-10-CM

## 2024-09-26 PROCEDURE — 99202 OFFICE O/P NEW SF 15 MIN: CPT | Mod: 25

## 2024-09-26 PROCEDURE — 46600 DIAGNOSTIC ANOSCOPY SPX: CPT

## 2024-09-26 ASSESSMENT — PAIN SCALES - GENERAL: PAINLEVEL: NO PAIN (0)

## 2024-09-26 NOTE — NURSING NOTE
"Chief Complaint   Patient presents with    Consult     Anal warts       Vitals:    09/26/24 1043   BP: 118/65   BP Location: Left arm   Patient Position: Sitting   Cuff Size: Adult Regular   Pulse: 86   SpO2: 99%   Weight: 138 lb 3.2 oz   Height: 5' 6.5\"       Body mass index is 21.97 kg/m .    Damaris Paris, EMT  "

## 2024-09-26 NOTE — LETTER
"2024       RE: Laci Dsouza  [address] [place] [address]     Dear Colleague,    Thank you for referring your patient, Laci Dsouza, to the [facility] COLON AND RECTAL SURGERY CLINIC [place] at [facility]. Please see a copy of my visit note below.    Colon and Rectal Surgery Consult Clinic Note    Date: 2024     Referring provider:  Chemo Waters, PREM CNP  [address] [address]     RE: Laci Dsouza  : 1990  DOMONIQUE: 2024    Laci Dsouza is a very pleasant 34 year old male here for anal warts. Patient states he has HPV- does not remember last time he was tested says it has been years and he has a flare up of warts. He reports he has had the warts biopsied in the past. Denies any drainage, itching or rectal bleeding.     Physical Examination: Exam was chaperoned by Damaris Paris EMT   /65 (BP Location: Left arm, Patient Position: Sitting, Cuff Size: Adult Regular)   Pulse 86   Ht 5' 6.5\"   Wt 138 lb 3.2 oz   SpO2 99%   BMI 21.97 kg/m    General: alert and sitting comfortably in his chair  HEENT: moist mucus membranes    Perianal external examination:  Perianal skin: Intact with no excoriation or lichenification.  Lesions: anal condyloma in left anterolateral position and right anterolateral position   Eversion of buttocks: There was not evidence of an anal fissure.  Skin tags or external hemorrhoids: None.  Digital rectal examination: Was performed.   Sphincter tone: Good.  Palpable lesions: No.      Anoscopy: Was performed.   Hemorrhoids: No significant internal hemorrhoids.      Assessment/Plan: 34 year old male with anal condylomas. Will have him follow up in two weeks for fulguration of anal condylomas. Patient is agreeable to this plan. Answered all his questions to his satisfaction. Encouraged to reach out with any additional questions or concerns.     Medical history:  Past Medical " History:   Diagnosis Date     Anxiety      Aortic atresia or stenosis, congenital 12/2001    Repair 2001 after had MI;     Asthma, moderate persistent, well-controlled      Congenital adrenal hyperplasia (H24) 1989    Dx at 1 mo of age; mother with CAH also (?)       Surgical history:  Past Surgical History:   Procedure Laterality Date     aortic stenosis s/p surgical repair including cor reconstruction 2001         Problem list:    Patient Active Problem List    Diagnosis Date Noted     Aortic stenosis 01/08/2020     Priority: Medium     Asthma 01/08/2020     Priority: Medium     Generalized anxiety disorder 01/08/2020     Priority: Medium     Hyponatremia 01/08/2020     Priority: Medium     Major depression 01/08/2020     Priority: Medium     Asthma, moderate persistent, well-controlled      Priority: Medium     Condylomata brien of perianal skin 11/03/2018     Priority: Medium     Anal warts 02/19/2016     Priority: Medium     Overview:   Liquid Nitrogen treatment started 1/22/16       Hepatitis B immune 02/05/2016     Priority: Medium     Overview:   2/3/16 Hep B antibody Reactive, Hep B antigen Non-Reactive       Congenital adrenal hyperplasia (H24)      Priority: Medium     Anxiety      Priority: Medium     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 MAY USE 2 EVERY 20 MINS FOR 3 DOSES. MAX OF 12 10.2 g 1     buPROPion (WELLBUTRIN XL) 150 MG 24 hr tablet Take 150 mg by mouth every morning       fludrocortisone (FLORINEF) 0.1 MG tablet Take 1 tablet (0.1 mg) by mouth daily 90 tablet 3      "hydrocortisone (CORTEF) 10 MG tablet Take 2 tablets in the morning and 1 tablet in the afternoon.  Plus extra tablets for sickness. 300 tablet 2     hydrocortisone sodium succinate PF (SOLU-CORTEF) injection Inject 2 mLs (100 mg) into the muscle once as needed Dispense as Act-O-Vial 2 mL 1     ketoconazole (NIZORAL) 2 % external cream Apply topically daily X 2-3 weeks until clinical resolution 60 g 2     ketoconazole (NIZORAL) 2 % external shampoo Apply topically twice a week Apply 5 to 10 mL to wet scalp; leave on for 3 to 5 minutes and then rinse off thoroughly. 120 mL 3     lamoTRIgine (LAMICTAL) 100 MG tablet Take 100 mg by mouth daily       sildenafil (VIAGRA) 50 MG tablet Take 1 tablet (50 mg) by mouth daily as needed (ED) 20 tablet 1     Syringe/Needle, Disp, (SYRINGE LUER LOCK) 23G X 1\" 3 ML MISC 100 mg as needed 3 each 3     triamcinolone (KENALOG) 0.1 % external ointment Apply to rash twice daily for 2-4 weeks, then stop. Restart if rash flares in the future. Not for groin, underarms, or face. 30 g 2       Allergies:  Allergies   Allergen Reactions     Ceclor [Cefaclor Monohydrate]      Unknown       Sulfa Antibiotics      Unknown         Family history:  Family History   Adopted: Yes   Problem Relation Age of Onset     Bipolar Disorder Father        Social history:  Social History     Tobacco Use     Smoking status: Former     Types: Cigarettes     Smokeless tobacco: Never   Substance Use Topics     Alcohol use: Yes     Comment: socially    Marital status: single.      Nursing Notes:   Damaris Paris  9/26/2024 10:46 AM  Signed  Chief Complaint   Patient presents with     Consult     Anal warts       Vitals:    09/26/24 1043   BP: 118/65   BP Location: Left arm   Patient Position: Sitting   Cuff Size: Adult Regular   Pulse: 86   SpO2: 99%   Weight: 138 lb 3.2 oz   Height: 5' 6.5\"       Body mass index is 21.97 kg/m .    Damaris Paris, ROSSY     20 minutes spent on the date of encounter performing chart review, " history and exam, documentation and further activities as noted above with an additional 2 min for anoscopy.     Mattie Blanco PA-C  Colon and Rectal Surgery   [facility]    This note was created using speech recognition software and may contain unintended word substitutions.      Again, thank you for allowing me to participate in the care of your patient.      Sincerely,    Mattie Blanco PA-C

## 2024-09-26 NOTE — PROGRESS NOTES
"Colon and Rectal Surgery Consult Clinic Note    Date: 2024     Referring provider:  PREM Martinez CNP  [address] [address]     RE: Laci Dsouza  : 1990  DOMONIQUE: 2024    Laci Dsouza is a very pleasant 34 year old male here for anal warts. Patient states he has HPV- does not remember last time he was tested says it has been years and he has a flare up of warts. He reports he has had the warts biopsied in the past. Denies any drainage, itching or rectal bleeding.     Physical Examination: Exam was chaperoned by ROSSY Bhardwaj   /65 (BP Location: Left arm, Patient Position: Sitting, Cuff Size: Adult Regular)   Pulse 86   Ht 5' 6.5\"   Wt 138 lb 3.2 oz   SpO2 99%   BMI 21.97 kg/m    General: alert and sitting comfortably in his chair  HEENT: moist mucus membranes    Perianal external examination:  Perianal skin: Intact with no excoriation or lichenification.  Lesions: anal condyloma in left anterolateral position and right anterolateral position   Eversion of buttocks: There was not evidence of an anal fissure.  Skin tags or external hemorrhoids: None.  Digital rectal examination: Was performed.   Sphincter tone: Good.  Palpable lesions: No.      Anoscopy: Was performed.   Hemorrhoids: No significant internal hemorrhoids.      Assessment/Plan: 34 year old male with anal condylomas. Will have him follow up in two weeks for fulguration of anal condylomas. Patient is agreeable to this plan. Answered all his questions to his satisfaction. Encouraged to reach out with any additional questions or concerns.     Medical history:  Past Medical History:   Diagnosis Date    Anxiety     Aortic atresia or stenosis, congenital 2001    Repair  after had MI;    Asthma, moderate persistent, well-controlled     Congenital adrenal hyperplasia (H24)     Dx at 1 mo of age; mother with CAH also (?)       Surgical history:  Past Surgical History:   Procedure Laterality " Date    aortic stenosis s/p surgical repair including cor reconstruction 2001         Problem list:    Patient Active Problem List    Diagnosis Date Noted    Aortic stenosis 01/08/2020     Priority: Medium    Asthma 01/08/2020     Priority: Medium    Generalized anxiety disorder 01/08/2020     Priority: Medium    Hyponatremia 01/08/2020     Priority: Medium    Major depression 01/08/2020     Priority: Medium    Asthma, moderate persistent, well-controlled      Priority: Medium    Condylomata brien of perianal skin 11/03/2018     Priority: Medium    Anal warts 02/19/2016     Priority: Medium     Overview:   Liquid Nitrogen treatment started 1/22/16      Hepatitis B immune 02/05/2016     Priority: Medium     Overview:   2/3/16 Hep B antibody Reactive, Hep B antigen Non-Reactive      Congenital adrenal hyperplasia (H24)      Priority: Medium    Anxiety      Priority: Medium    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 MAY USE 2 EVERY 20 MINS FOR 3 DOSES. MAX OF 12 10.2 g 1    buPROPion (WELLBUTRIN XL) 150 MG 24 hr tablet Take 150 mg by mouth every morning      fludrocortisone (FLORINEF) 0.1 MG tablet Take 1 tablet (0.1 mg) by mouth daily 90 tablet 3    hydrocortisone (CORTEF) 10 MG tablet Take 2 tablets in the morning and 1 tablet in the afternoon.  Plus extra tablets for sickness. 300 tablet 2    hydrocortisone sodium succinate PF (SOLU-CORTEF) injection Inject 2 mLs (100 mg) into the muscle once as needed Dispense as Act-O-Vial 2 mL 1    ketoconazole (NIZORAL) 2 % external cream Apply topically  "daily X 2-3 weeks until clinical resolution 60 g 2    ketoconazole (NIZORAL) 2 % external shampoo Apply topically twice a week Apply 5 to 10 mL to wet scalp; leave on for 3 to 5 minutes and then rinse off thoroughly. 120 mL 3    lamoTRIgine (LAMICTAL) 100 MG tablet Take 100 mg by mouth daily      sildenafil (VIAGRA) 50 MG tablet Take 1 tablet (50 mg) by mouth daily as needed (ED) 20 tablet 1    Syringe/Needle, Disp, (SYRINGE LUER LOCK) 23G X 1\" 3 ML MISC 100 mg as needed 3 each 3    triamcinolone (KENALOG) 0.1 % external ointment Apply to rash twice daily for 2-4 weeks, then stop. Restart if rash flares in the future. Not for groin, underarms, or face. 30 g 2       Allergies:  Allergies   Allergen Reactions    Ceclor [Cefaclor Monohydrate]      Unknown      Sulfa Antibiotics      Unknown         Family history:  Family History   Adopted: Yes   Problem Relation Age of Onset    Bipolar Disorder Father        Social history:  Social History     Tobacco Use    Smoking status: Former     Types: Cigarettes    Smokeless tobacco: Never   Substance Use Topics    Alcohol use: Yes     Comment: socially    Marital status: single.      Nursing Notes:   Damaris Paris  9/26/2024 10:46 AM  Signed  Chief Complaint   Patient presents with    Consult     Anal warts       Vitals:    09/26/24 1043   BP: 118/65   BP Location: Left arm   Patient Position: Sitting   Cuff Size: Adult Regular   Pulse: 86   SpO2: 99%   Weight: 138 lb 3.2 oz   Height: 5' 6.5\"       Body mass index is 21.97 kg/m .    ROSSY Bhardwaj     20 minutes spent on the date of encounter performing chart review, history and exam, documentation and further activities as noted above with an additional 2 min for anoscopy.     Mattie Blanco PA-C  Colon and Rectal Surgery   [facility]    This note was created using speech recognition software and may contain unintended word substitutions.    "

## 2024-10-07 ENCOUNTER — OFFICE VISIT (OUTPATIENT)
Dept: SURGERY | Facility: CLINIC | Age: 34
End: 2024-10-07
Payer: COMMERCIAL

## 2024-10-07 VITALS
BODY MASS INDEX: 22.07 KG/M2 | DIASTOLIC BLOOD PRESSURE: 94 MMHG | OXYGEN SATURATION: 100 % | SYSTOLIC BLOOD PRESSURE: 162 MMHG | WEIGHT: 140.6 LBS | HEART RATE: 110 BPM | HEIGHT: 67 IN

## 2024-10-07 DIAGNOSIS — A63.0 ANAL WARTS: Primary | ICD-10-CM

## 2024-10-07 PROCEDURE — 99203 OFFICE O/P NEW LOW 30 MIN: CPT | Mod: 25 | Performed by: NURSE PRACTITIONER

## 2024-10-07 PROCEDURE — 46924 DESTRUCTION ANAL LESION(S): CPT | Performed by: NURSE PRACTITIONER

## 2024-10-07 ASSESSMENT — PAIN SCALES - GENERAL: PAINLEVEL: NO PAIN (0)

## 2024-10-07 NOTE — NURSING NOTE
"Chief Complaint   Patient presents with    Follow Up     Wart fulgaration       Vitals:    10/07/24 1232   BP: (!) 162/94   BP Location: Left arm   Patient Position: Sitting   Cuff Size: Adult Regular   Pulse: 110   SpO2: 100%   Weight: 140 lb 9.6 oz   Height: 5' 6.5\"       Body mass index is 22.35 kg/m .    Damaris Paris, EMT  "

## 2024-10-07 NOTE — PROGRESS NOTES
"Colon and Rectal Surgery Follow-Up Clinic Note    RE: Laci Dsouza  : 1990  DOMONIQUE: 10/7/2024    Laci Dsouza is a very pleasant 34 year old male here for follow-up of anal warts.    Interval history: Laci saw RICKY Mcknight on  with perianal and intraanal warts. See her note for full details. He presents today for fulguration.    Physical Examination: Exam was chaperoned by Damaris Paris EMT   BP (!) 162/94 (BP Location: Left arm, Patient Position: Sitting, Cuff Size: Adult Regular)   Pulse 110   Ht 5' 6.5\"   Wt 140 lb 9.6 oz   SpO2 100%   BMI 22.35 kg/m    General: alert, oriented, in no acute distress  HEENT: mucous membranes moist    Perianal external examination:  Perianal skin: scattered sebaceous cysts with waxy exudate and small skin tag in the posterior midline.    Digital rectal examination: Was performed.   Sphincter tone: Good.  Palpable lesions: No.  Prostate: Normal.  Other: None..    Anoscopy: Was performed.   Hemorrhoids: Yes. Small internal hemorrhoids without bleeding. Several hypertrophied anal papillae present  Lesions: Yes: tiny verruciform lesion in the left posterior position.    Procedure:   Prior to the start of the procedure and with procedural staff participation, I verbally confirmed the patient s identity using two indicators, relevant allergies, that the procedure was appropriate and matched the consent or emergent situation, and that the correct equipment/implants were available. Immediately prior to starting the procedure I conducted the Time Out with the procedural staff and re-confirmed the patient s name, procedure, and site/side. (The Joint Commission universal protocol was followed.)  Yes    Sedation (Moderate or Deep): None     After injection with 1% lidocaine with epinephrine, entire lesion in the left posterior anal canal was destroyed with cautery.  He tolerated this well.    Assessment/Plan: 34 year old male with anal condyloma that was fulgurated " in clinic today. Discussed recurrent nature of warts and advised him to return after 6 months for a wart check or anytime in the meantime if he develops any new lesions or with any questions or concerns. Patient's questions were answered to his stated satisfaction and he is in agreement with this plan.     Medical history:  Past Medical History:   Diagnosis Date    Anxiety     Aortic atresia or stenosis, congenital 12/2001    Repair 2001 after had MI;    Asthma, moderate persistent, well-controlled     Congenital adrenal hyperplasia (H) 1989    Dx at 1 mo of age; mother with CAH also (?)       Surgical history:  Past Surgical History:   Procedure Laterality Date    aortic stenosis s/p surgical repair including cor reconstruction 2001         Problem list:    Patient Active Problem List    Diagnosis Date Noted    Aortic stenosis 01/08/2020     Priority: Medium    Asthma 01/08/2020     Priority: Medium    Generalized anxiety disorder 01/08/2020     Priority: Medium    Hyponatremia 01/08/2020     Priority: Medium    Major depression 01/08/2020     Priority: Medium    Asthma, moderate persistent, well-controlled      Priority: Medium    Condylomata brien of perianal skin 11/03/2018     Priority: Medium    Anal warts 02/19/2016     Priority: Medium     Overview:   Liquid Nitrogen treatment started 1/22/16      Hepatitis B immune 02/05/2016     Priority: Medium     Overview:   2/3/16 Hep B antibody Reactive, Hep B antigen Non-Reactive      Congenital adrenal hyperplasia (H)      Priority: Medium    Anxiety      Priority: Medium    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 MAY USE 2 EVERY 20 MINS FOR 3 DOSES. MAX OF 12 10.2 g 1    buPROPion (WELLBUTRIN XL) 150 MG 24 hr tablet Take 150 mg by mouth every morning      fludrocortisone (FLORINEF) 0.1 MG tablet Take 1 tablet (0.1 mg) by mouth daily 90 tablet 3    hydrocortisone (CORTEF) 10 MG tablet Take 2 tablets in the morning and 1 tablet in the afternoon.  Plus extra tablets for sickness. 300 tablet 2    hydrocortisone sodium succinate PF (SOLU-CORTEF) injection Inject 2 mLs (100 mg) into the muscle once as needed Dispense as Act-O-Vial 2 mL 1    ketoconazole (NIZORAL) 2 % external cream Apply topically daily X 2-3 weeks until clinical resolution 60 g 2    ketoconazole (NIZORAL) 2 % external shampoo Apply topically twice a week Apply 5 to 10 mL to wet scalp; leave on for 3 to 5 minutes and then rinse off thoroughly. 120 mL 3    lamoTRIgine (LAMICTAL) 100 MG tablet Take 100 mg by mouth daily      sildenafil (VIAGRA) 50 MG tablet Take 1 tablet (50 mg) by mouth daily as needed (ED) 20 tablet 1    Syringe/Needle, Disp, (SYRINGE LUER LOCK) 23G X 1\" 3 ML MISC 100 mg as needed 3 each 3    triamcinolone (KENALOG) 0.1 % external ointment Apply to rash twice daily for 2-4 weeks, then stop. Restart if rash flares in the future. Not for groin, underarms, or face. 30 g 2       Allergies:  Allergies   Allergen Reactions    Ceclor [Cefaclor Monohydrate]      Unknown      Sulfa Antibiotics      Unknown         Family history:  Family History   Adopted: Yes   Problem Relation Age of Onset    Bipolar Disorder Father        Social history:  Social History     Tobacco Use    Smoking status: Former     Types: Cigarettes    Smokeless tobacco: Never   Substance Use Topics    Alcohol use: Yes     Comment: socially     Marital status: single.    Nursing Notes:   Damaris Paris  10/7/2024 12:34 PM  Signed  Chief Complaint   Patient presents with    Follow Up     Wart fulgaration       Vitals: " "   10/07/24 1232   BP: (!) 162/94   BP Location: Left arm   Patient Position: Sitting   Cuff Size: Adult Regular   Pulse: 110   SpO2: 100%   Weight: 140 lb 9.6 oz   Height: 5' 6.5\"       Body mass index is 22.35 kg/m .    ROSSY Bhardwaj     15 minutes spent on the date of encounter performing chart review, history and exam, documentation and further activities as noted above     MATT GaleC  Colon and Rectal Surgery  [facility]    "

## 2024-10-07 NOTE — LETTER
"10/7/2024       RE: Laci Dsouza  [address] [place] [address]     Dear Colleague,    Thank you for referring your patient, Laci Dsouza, to the [facility] [place] at [facility]. Please see a copy of my visit note below.    Colon and Rectal Surgery Follow-Up Clinic Note    RE: Laci Dsouza  : 1990  DOMONIQUE: 10/7/2024    Laci Dsouza is a very pleasant 34 year old male here for follow-up of anal warts.    Interval history: Laci valverde RICKY Mcknight on  with perianal and intraanal warts. See her note for full details. He presents today for fulguration.    Physical Examination: Exam was chaperoned by Damaris Paris EMT   BP (!) 162/94 (BP Location: Left arm, Patient Position: Sitting, Cuff Size: Adult Regular)   Pulse 110   Ht 5' 6.5\"   Wt 140 lb 9.6 oz   SpO2 100%   BMI 22.35 kg/m    General: alert, oriented, in no acute distress  HEENT: mucous membranes moist    Perianal external examination:  Perianal skin: scattered sebaceous cysts with waxy exudate and small skin tag in the posterior midline.    Digital rectal examination: Was performed.   Sphincter tone: Good.  Palpable lesions: No.  Prostate: Normal.  Other: None..    Anoscopy: Was performed.   Hemorrhoids: Yes. Small internal hemorrhoids without bleeding. Several hypertrophied anal papillae present  Lesions: Yes: tiny verruciform lesion in the left posterior position.    Procedure:   Prior to the start of the procedure and with procedural staff participation, I verbally confirmed the patient s identity using two indicators, relevant allergies, that the procedure was appropriate and matched the consent or emergent situation, and that the correct equipment/implants were available. Immediately prior to starting the procedure I conducted the Time Out with the procedural staff and re-confirmed the patient s name, procedure, and site/side. (The " Joint Commission universal protocol was followed.)  Yes    Sedation (Moderate or Deep): None     After injection with 1% lidocaine with epinephrine, entire lesion in the left posterior anal canal was destroyed with cautery.  He tolerated this well.    Assessment/Plan: 34 year old male with anal condyloma that was fulgurated in clinic today. Discussed recurrent nature of warts and advised him to return after 6 months for a wart check or anytime in the meantime if he develops any new lesions or with any questions or concerns. Patient's questions were answered to his stated satisfaction and he is in agreement with this plan.     Medical history:  Past Medical History:   Diagnosis Date     Anxiety      Aortic atresia or stenosis, congenital 12/2001    Repair 2001 after had MI;     Asthma, moderate persistent, well-controlled      Congenital adrenal hyperplasia (H) 1989    Dx at 1 mo of age; mother with CAH also (?)       Surgical history:  Past Surgical History:   Procedure Laterality Date     aortic stenosis s/p surgical repair including cor reconstruction 2001         Problem list:    Patient Active Problem List    Diagnosis Date Noted     Aortic stenosis 01/08/2020     Priority: Medium     Asthma 01/08/2020     Priority: Medium     Generalized anxiety disorder 01/08/2020     Priority: Medium     Hyponatremia 01/08/2020     Priority: Medium     Major depression 01/08/2020     Priority: Medium     Asthma, moderate persistent, well-controlled      Priority: Medium     Condylomata brien of perianal skin 11/03/2018     Priority: Medium     Anal warts 02/19/2016     Priority: Medium     Overview:   Liquid Nitrogen treatment started 1/22/16       Hepatitis B immune 02/05/2016     Priority: Medium     Overview:   2/3/16 Hep B antibody Reactive, Hep B antigen Non-Reactive       Congenital adrenal hyperplasia (H)      Priority: Medium     Anxiety      Priority: Medium     Congenital adrenal hyperplasia due to 21-hydroxylase  "deficiency (21-OH CAH), simple virilizing (H) 12/12/2012     Priority: Medium     Aortic atresia or stenosis, congenital 12/01/2001     Priority: Medium     Repair 2001 after had MI;         Medications:  Current Outpatient Medications   Medication Sig Dispense Refill     budesonide-formoterol (SYMBICORT) 160-4.5 MCG/ACT Inhaler INHALE TWO PUFFS TWICE DAILY AND 1 TO 2 WITH SPACER EVERY 4 HOURS FOR PERSISENT SYMPTOMS. MAY USE 2 EVERY 20 MINS FOR 3 DOSES. MAX OF 12 10.2 g 1     buPROPion (WELLBUTRIN XL) 150 MG 24 hr tablet Take 150 mg by mouth every morning       fludrocortisone (FLORINEF) 0.1 MG tablet Take 1 tablet (0.1 mg) by mouth daily 90 tablet 3     hydrocortisone (CORTEF) 10 MG tablet Take 2 tablets in the morning and 1 tablet in the afternoon.  Plus extra tablets for sickness. 300 tablet 2     hydrocortisone sodium succinate PF (SOLU-CORTEF) injection Inject 2 mLs (100 mg) into the muscle once as needed Dispense as Act-O-Vial 2 mL 1     ketoconazole (NIZORAL) 2 % external cream Apply topically daily X 2-3 weeks until clinical resolution 60 g 2     ketoconazole (NIZORAL) 2 % external shampoo Apply topically twice a week Apply 5 to 10 mL to wet scalp; leave on for 3 to 5 minutes and then rinse off thoroughly. 120 mL 3     lamoTRIgine (LAMICTAL) 100 MG tablet Take 100 mg by mouth daily       sildenafil (VIAGRA) 50 MG tablet Take 1 tablet (50 mg) by mouth daily as needed (ED) 20 tablet 1     Syringe/Needle, Disp, (SYRINGE LUER LOCK) 23G X 1\" 3 ML MISC 100 mg as needed 3 each 3     triamcinolone (KENALOG) 0.1 % external ointment Apply to rash twice daily for 2-4 weeks, then stop. Restart if rash flares in the future. Not for groin, underarms, or face. 30 g 2       Allergies:  Allergies   Allergen Reactions     Ceclor [Cefaclor Monohydrate]      Unknown       Sulfa Antibiotics      Unknown         Family history:  Family History   Adopted: Yes   Problem Relation Age of Onset     Bipolar Disorder Father  " "      Social history:  Social History     Tobacco Use     Smoking status: Former     Types: Cigarettes     Smokeless tobacco: Never   Substance Use Topics     Alcohol use: Yes     Comment: socially     Marital status: single.    Nursing Notes:   Damaris Paris  10/7/2024 12:34 PM  Signed  Chief Complaint   Patient presents with     Follow Up     Wart fulgaration       Vitals:    10/07/24 1232   BP: (!) 162/94   BP Location: Left arm   Patient Position: Sitting   Cuff Size: Adult Regular   Pulse: 110   SpO2: 100%   Weight: 140 lb 9.6 oz   Height: 5' 6.5\"       Body mass index is 22.35 kg/m .    Damaris Paris, ROSSY     15 minutes spent on the date of encounter performing chart review, history and exam, documentation and further activities as noted above     MATT GaleC  Colon and Rectal Surgery  [facility]      Again, thank you for allowing me to participate in the care of your patient.      Sincerely,    Ela Comer, APRN CNP    "

## 2024-10-16 ENCOUNTER — OFFICE VISIT (OUTPATIENT)
Dept: ENDOCRINOLOGY | Facility: CLINIC | Age: 34
End: 2024-10-16
Payer: COMMERCIAL

## 2024-10-16 VITALS
SYSTOLIC BLOOD PRESSURE: 129 MMHG | WEIGHT: 137 LBS | BODY MASS INDEX: 21.78 KG/M2 | DIASTOLIC BLOOD PRESSURE: 82 MMHG | HEART RATE: 97 BPM | OXYGEN SATURATION: 100 %

## 2024-10-16 DIAGNOSIS — E27.40 ADRENAL INSUFFICIENCY (H): ICD-10-CM

## 2024-10-16 DIAGNOSIS — D35.00 ADRENAL REST TUMOR: ICD-10-CM

## 2024-10-16 DIAGNOSIS — E25.0 CONGENITAL ADRENAL HYPERPLASIA DUE TO 21-HYDROXYLASE DEFICIENCY (21-OH CAH), SIMPLE VIRILIZING (H): Primary | ICD-10-CM

## 2024-10-16 LAB
ANION GAP SERPL CALCULATED.3IONS-SCNC: 11 MMOL/L (ref 7–15)
BUN SERPL-MCNC: 9.7 MG/DL (ref 6–20)
CALCIUM SERPL-MCNC: 9.5 MG/DL (ref 8.8–10.4)
CHLORIDE SERPL-SCNC: 102 MMOL/L (ref 98–107)
CREAT SERPL-MCNC: 0.77 MG/DL (ref 0.67–1.17)
EGFRCR SERPLBLD CKD-EPI 2021: >90 ML/MIN/1.73M2
FSH SERPL IRP2-ACNC: 4.6 MIU/ML (ref 1.5–12.4)
GLUCOSE SERPL-MCNC: 88 MG/DL (ref 70–99)
HCO3 SERPL-SCNC: 25 MMOL/L (ref 22–29)
LH SERPL-ACNC: 7 MIU/ML (ref 1.7–8.6)
POTASSIUM SERPL-SCNC: 3.8 MMOL/L (ref 3.4–5.3)
SHBG SERPL-SCNC: 43 NMOL/L (ref 11–80)
SODIUM SERPL-SCNC: 138 MMOL/L (ref 135–145)

## 2024-10-16 PROCEDURE — 84270 ASSAY OF SEX HORMONE GLOBUL: CPT | Performed by: INTERNAL MEDICINE

## 2024-10-16 PROCEDURE — 84244 ASSAY OF RENIN: CPT | Mod: 90 | Performed by: INTERNAL MEDICINE

## 2024-10-16 PROCEDURE — G2211 COMPLEX E/M VISIT ADD ON: HCPCS | Performed by: INTERNAL MEDICINE

## 2024-10-16 PROCEDURE — 99000 SPECIMEN HANDLING OFFICE-LAB: CPT | Performed by: INTERNAL MEDICINE

## 2024-10-16 PROCEDURE — 83002 ASSAY OF GONADOTROPIN (LH): CPT | Performed by: INTERNAL MEDICINE

## 2024-10-16 PROCEDURE — 80048 BASIC METABOLIC PNL TOTAL CA: CPT | Performed by: INTERNAL MEDICINE

## 2024-10-16 PROCEDURE — 83001 ASSAY OF GONADOTROPIN (FSH): CPT | Performed by: INTERNAL MEDICINE

## 2024-10-16 PROCEDURE — 99214 OFFICE O/P EST MOD 30 MIN: CPT | Performed by: INTERNAL MEDICINE

## 2024-10-16 PROCEDURE — 84403 ASSAY OF TOTAL TESTOSTERONE: CPT | Performed by: INTERNAL MEDICINE

## 2024-10-16 PROCEDURE — 82157 ASSAY OF ANDROSTENEDIONE: CPT | Mod: 90 | Performed by: INTERNAL MEDICINE

## 2024-10-16 PROCEDURE — 36415 COLL VENOUS BLD VENIPUNCTURE: CPT | Performed by: INTERNAL MEDICINE

## 2024-10-16 PROCEDURE — 82024 ASSAY OF ACTH: CPT | Performed by: INTERNAL MEDICINE

## 2024-10-16 PROCEDURE — 82627 DEHYDROEPIANDROSTERONE: CPT | Performed by: INTERNAL MEDICINE

## 2024-10-16 RX ORDER — SYRINGE W-NEEDLE,DISPOSAB,3 ML 23GX1"
100 SYRINGE, EMPTY DISPOSABLE MISCELLANEOUS PRN
Qty: 3 EACH | Refills: 3 | Status: SHIPPED | OUTPATIENT
Start: 2024-10-16

## 2024-10-16 RX ORDER — HYDROCORTISONE 5 MG/1
5 TABLET ORAL DAILY
Qty: 600 TABLET | Refills: 3 | Status: CANCELLED | OUTPATIENT
Start: 2024-10-16

## 2024-10-16 RX ORDER — HYDROCORTISONE SODIUM SUCCINATE 100 MG/2ML
100 INJECTION INTRAMUSCULAR; INTRAVENOUS ONCE
Qty: 2 ML | Refills: 1 | Status: SHIPPED | OUTPATIENT
Start: 2024-10-16 | End: 2024-10-16

## 2024-10-16 RX ORDER — FLUDROCORTISONE ACETATE 0.1 MG/1
0.1 TABLET ORAL DAILY
Qty: 90 TABLET | Refills: 3 | Status: SHIPPED | OUTPATIENT
Start: 2024-10-16 | End: 2024-10-25

## 2024-10-16 RX ORDER — HYDROCORTISONE 10 MG/1
TABLET ORAL
Qty: 300 TABLET | Refills: 3 | Status: SHIPPED | OUTPATIENT
Start: 2024-10-16 | End: 2024-10-25

## 2024-10-16 NOTE — NURSING NOTE
Laci Dsouza's goals for this visit include:   Chief Complaint   Patient presents with    Follow Up    Endocrine Problem     He requests these members of his care team be copied on today's visit information: NA    PCP: No Ref-Primary, Physician    Referring Provider:  Referred Self, MD  No address on file    /82 (BP Location: Left arm, Patient Position: Sitting, Cuff Size: Adult Regular)   Pulse 97   Wt 62.1 kg (137 lb)   SpO2 100%   BMI 21.78 kg/m      Do you need any medication refills at today's visit? Yes     Danette Hatfield CMA  Adult Endocrinology   [facility]

## 2024-10-16 NOTE — PROGRESS NOTES
=========================================================================================    Assessment:    1.  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  BP at times has been high normal.  Recommendations:  Labs today, keeping in mind the patient took his morning meds at 6:15 am   Check BP at home (his mother has a BP cuff) and contact us with the BP numbers in a couple of weeks. If BP >130/80, consider decreasing the dose of fluorinef.   Wear a medical alert device stating the dx of AI. The patient received a bracelet in the clinic.     2.  Adrenal rest tumors  Schedule a testicular US      Orders Placed This Encounter   Procedures    US Testicular & Scrotum w Doppler Ltd    Adrenal corticotropin    Basic metabolic panel    DHEA sulfate    Androstenedione    Follicle stimulating hormone    Luteinizing Hormone    Renin activity    Sex Hormone Binding Globulin    Testosterone Free and Total    Testosterone Free and Total     =========================================================================================    Laci GONZALEZ Lianna is a 34 year old male with congenital adrenal hyperplasia, on hydrocortisone and Florinef therapy.  He was previously seen by Dr. Cordova and he established care with me in 2018. The patient was diagnosed at 1 month of age and has been on lifelong hormone replacement.  Current dose of hydrocortisone is 20 mg in the morning, when he wakes up, and 10 mg around 2 PM.  He takes the Florinef dose in the morning, 0.1 mg daily.     Pertinent lab work reviewed:  1/23/2024: ACTH 56, cortisol 21.9, renin 1.2, androstenedione 5116, total and free testosterone normal, with a free testosterone of 8.94, LH 2, FSH 4, DHEA-S normal at 109, sex hormone binding globulin normal.    12/13/2021, done at 5:30 PM:  CMP was normal, renin was 3.7, ACTH was 15, androstendione was normal  at 1284, cortisol level was 6.1 at 5:30 PM, DHEA-S was 99, both total and free testosterone were low (155 and 2.71, checked in the afternoon), LH was 3.4 and FSH was 2.8, morning free testosterone was 3.16.    The testicular US from 12/2018 revealed irregular similar-appearing hypoechoic foci in the left and right testes, measuring 1.1 and 2.7 cm, suggestive of testicular adrenal rests. They remained stable on the f/up testicular US from 12/2019 and 12/13/2021.     Today, Laci, took his morning medications at 6:15 AM.  He denies any specific complaints.  He has not experienced episodes of sickness requiring a higher dose of hydrocortisone since his last visit here.    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/2001    Repair 2001 after had MI;    Asthma, moderate persistent, well-controlled     Congenital adrenal hyperplasia (H) 1989    Dx at 1 mo of age; mother with CAH also (?)   Depression   No fractures  Asthma  Urinary urgency/retention -evaluated in the urology clinic in 2020  Tinea corporis     Past Surgical History   Past Surgical History:   Procedure Laterality Date    aortic stenosis s/p surgical repair including cor reconstruction 2001         Current Medications    Current Outpatient Medications:     budesonide-formoterol (SYMBICORT) 160-4.5 MCG/ACT Inhaler, INHALE TWO PUFFS TWICE DAILY AND 1 TO 2 WITH SPACER EVERY 4 HOURS FOR PERSISENT SYMPTOMS. MAY USE 2 EVERY 20 MINS FOR 3 DOSES. MAX OF 12, Disp: 10.2 g, Rfl: 1    buPROPion (WELLBUTRIN XL) 150 MG 24 hr tablet, Take 150 mg by mouth every morning, Disp: , Rfl:     fludrocortisone (FLORINEF) 0.1 MG tablet, Take 1 tablet (0.1 mg) by mouth daily, Disp: 90 tablet, Rfl: 3    hydrocortisone (CORTEF) 10 MG tablet, Take 2 tablets in the morning and 1 tablet in the afternoon.  Plus extra tablets for sickness., Disp: 300 tablet, Rfl: 2    hydrocortisone sodium succinate PF (SOLU-CORTEF)  "injection, Inject 2 mLs (100 mg) into the muscle once as needed Dispense as Act-O-Vial, Disp: 2 mL, Rfl: 1    ketoconazole (NIZORAL) 2 % external cream, Apply topically daily X 2-3 weeks until clinical resolution, Disp: 60 g, Rfl: 2    ketoconazole (NIZORAL) 2 % external shampoo, Apply topically twice a week Apply 5 to 10 mL to wet scalp; leave on for 3 to 5 minutes and then rinse off thoroughly., Disp: 120 mL, Rfl: 3    lamoTRIgine (LAMICTAL) 100 MG tablet, Take 100 mg by mouth daily, Disp: , Rfl:     sildenafil (VIAGRA) 50 MG tablet, Take 1 tablet (50 mg) by mouth daily as needed (ED), Disp: 20 tablet, Rfl: 1    Syringe/Needle, Disp, (SYRINGE LUER LOCK) 23G X 1\" 3 ML MISC, 100 mg as needed, Disp: 3 each, Rfl: 3    triamcinolone (KENALOG) 0.1 % external ointment, Apply to rash twice daily for 2-4 weeks, then stop. Restart if rash flares in the future. Not for groin, underarms, or face., Disp: 30 g, Rfl: 2     Family History   Mother - congenital adrenal hyperplasia. Father - CVA in his 60s.     Social History  Lives with a partner. No children. Former smoker, 1/2 PPD for 10 years.  Denies using illicit drugs. Occupation: unemployed.     Review of Systems   Systemic:             No fatigue   Eye:                      No eye symptoms   Lizeth-Laryngeal:     No lizeth-laryngeal symptoms, no dysphagia, no hoarseness, no cough     Breast:                  No breast symptoms  Cardiovascular:    No cardiovascular symptoms, no CP or palpitations   Pulmonary:           No pulmonary symptoms, no SOB or cough    Gastrointestinal:   diarrhea since taking a new antidepressive medication    Genitourinary:      Urinates ~1 times a night, 2-3 times a week   Endocrine:            No endocrine symptoms, no cold or heat intolerance; ED significantly improved with viagra  Neurological:        No headaches, no tremor, no numbness or tingling sensation, no dizziness   Musculoskeletal: He is currently training with a  and trying " to build up muscle mass.    Skin:                     no dry skin, no hair falling out; no changes of the facial hair; no acne, no stretch marks; no easy bruising   Psychological:      Depression - longstanding - seeing a counselor               Vital Signs     Previous Weights:    Wt Readings from Last 10 Encounters:   10/07/24 63.8 kg (140 lb 9.6 oz)   09/26/24 62.7 kg (138 lb 3.2 oz)   09/06/24 61.7 kg (136 lb)   08/22/24 63.1 kg (139 lb 3.2 oz)   05/03/24 64.4 kg (142 lb)   11/20/23 72.1 kg (159 lb)   09/20/23 73.9 kg (163 lb)   04/26/23 78.5 kg (173 lb)   06/20/22 77 kg (169 lb 12.8 oz)   12/20/21 72.6 kg (160 lb)     BP Readings from Last 6 Encounters:   10/07/24 (!) 162/94   09/26/24 118/65   09/06/24 109/76   08/22/24 128/82   05/03/24 117/73   11/20/23 138/78        /82 (BP Location: Left arm, Patient Position: Sitting, Cuff Size: Adult Regular)   Pulse 97   Wt 62.1 kg (137 lb)   SpO2 100%   BMI 21.78 kg/m      General appearance: thin, no distress noted   Eyes: conjunctivae and extraocular motions are normal. Pupils are equal, round, and reactive to light. No lid lag, no stare.  HENT: oropharynx clear and moist; neck no JVD, no bruits, no thyromegaly, no palpable nodules  Cardiovascular: regular rhythm, hyperkinetic, no murmurs, distal pulses palpable, no edema  Respiratory: chest clear, no rales, no rhonchi  Musculoskeletal: normal tone and strength   Neurologic: reflexes normal and symmetric, fine resting tremor of the outstretched hands  Psychiatric: affect and judgment normal   Skin: No stretch marks, no bruises, no hyperpigmentation  Genital exam: normal testicular size, no palpable masses     Lab Results  I reviewed prior lab results documented in Epic.  TSH   Date Value Ref Range Status   09/22/2023 1.49 0.30 - 4.20 uIU/mL Final   12/22/2021 0.29 (L) 0.40 - 4.00 mU/L Final   08/07/2008 0.34 mcU/mL Final     The longitudinal plan of care for the diagnosis(es)/condition(s) as documented were  addressed during this visit. Due to the added complexity in care, I will continue to support Laci in the subsequent management and with ongoing continuity of care.

## 2024-10-16 NOTE — PATIENT INSTRUCTIONS
Welcome to the [facility] Endocrinology and Diabetes Clinics     Our Endocrinology Clinics are here to provide you with a team-based, collaborative approach in the diagnosis and treatment of patients with diabetes and endocrine disorders. The team is made up of Physicians, Physician Assistants, Certified Diabetes Educators, Registered Nurses, Medical Assistants, Emergency Medical Technicians, and many others, all of whom have the unified goal of providing our patients with high quality care.     Please see below for some helpful tips to best navigate and use the [facility] Endocrinology clinic:     [place] Respect: At [facility], we are committed to a respectful and safe space for all patients, visitors, and staff.  We believe that mutual respect between patients and their care team is the foundation of quality care.  It is our expectation that you will be treated with respect by your care team.  In turn, we ask that all communication with the care team (written and verbal) be respectful and free from profanity, threatening, or abusive language.  Disrespectful communication undermines our therapeutic relationship with you and may result in us being unable to continue to provide your care.    Refills: A provider must see you at least annually to prescribe and refill medications. This is to ensure your safety as well as meet insurance and compliance regulations.    Scheduling: Many of our Providers offer both in-person or video visits. Please call to schedule any needed follow ups as soon as possible because our provider schedules fill up very quickly. Our care team has the right to require an in-person visit when they believe that it is medically necessary. Please remember that for any virtual visits, you must be in the [facility] at the time of the visit, otherwise we are unable to see you and you will need to be rescheduled.    Missed Appointments: If you need to cancel or miss your  scheduled appointment, please call the clinic at [phone] to reschedule.  Please note if you repeatedly miss appointments or repeatedly miss appointments without calling to inform us ahead of time (no-show), the clinic may elect to not allow you to reschedule without speaking to a manager, may require a Partnership In Care Agreement prior to rescheduling, or could result in you no longer being able to receive care from the clinic. Providing the clinic with timely notification if you have to miss an appointment, allows us to better serve the needs of all of our patients.    Primary Care Provider: Our Endocrinologists are Specialists in their field. We expect you to have a Primary Care Provider established to handle any needs outside of your diabetes and endocrine care.  We would be happy to assist you find a Primary Care Provider, if you do not have one.    [employer]: [employer] is a wonderful resource that allows you access to your Care Team via online or the garth. Please ask a member of the team if you would like help creating an account. Please note that it may take up to 2 business days for a response. [employer] messages are not reviewed on weekends or after business hours.  Emergent or urgent care needs should never be communicated via [employer].  If you experience a medical emergency call 911 or go to the nearest emergency room.    Labs: It is recommended that you stay within the [facility] for labs but you are welcome to obtain ordered labs (with some exceptions) from any location of your choice as long as they are able to complete and process the needed labs. If you need us to fax orders to your preferred lab, please provide us the name and fax number of the lab you would like to go to so we can fax the orders. If your labs are drawn outside of the [facility], please have them fax the results to [phone] ([place]) or [phone] ([place]) or via [facility] [place]. It is your  responsibility to ensure that outside lab results are sent to us.    We look forward to working with you. Please do not hesitate to reach out with any questions.    Thank you,    The Endocrine Team    [facility] Address:   [place] Address:     [address] [place] [address]    Phone: [phone]  Fax: [phone] [address] [place] [address]    Phone: [phone]  Fax: [phone]     [facility] Cost Estimate Phone Number: [phone]    General Lab and Imaging Scheduling Phone Number: [phone]

## 2024-10-16 NOTE — LETTER
10/16/2024      Laci Dsouza  [address] [place] [address]      Dear Colleague,    Thank you for referring your patient, Laci Dsouza, to the [facility]. Please see a copy of my visit note below.    =========================================================================================    Assessment:    1.  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  BP at times has been high normal.  Recommendations:  Labs today, keeping in mind the patient took his morning meds at 6:15 am   Check BP at home (his mother has a BP cuff) and contact us with the BP numbers in a couple of weeks. If BP >130/80, consider decreasing the dose of fluorinef.   Wear a medical alert device stating the dx of AI. The patient received a bracelet in the clinic.     2.  Adrenal rest tumors  Schedule a testicular US      Orders Placed This Encounter   Procedures     US Testicular & Scrotum w Doppler Ltd     Adrenal corticotropin     Basic metabolic panel     DHEA sulfate     Androstenedione     Follicle stimulating hormone     Luteinizing Hormone     Renin activity     Sex Hormone Binding Globulin     Testosterone Free and Total     Testosterone Free and Total     =========================================================================================    Laci Dsouza is a 34 year old male with congenital adrenal hyperplasia, on hydrocortisone and Florinef therapy.  He was previously seen by Dr. Cordova and he established care with me in 2018. The patient was diagnosed at 1 month of age and has been on lifelong hormone replacement.  Current dose of hydrocortisone is 20 mg in the morning, when he wakes up, and 10 mg around 2 PM.  He takes the Florinef dose in the morning, 0.1 mg daily.     Pertinent lab work reviewed:  1/23/2024: ACTH 56, cortisol 21.9, renin 1.2, androstenedione  5116, total and free testosterone normal, with a free testosterone of 8.94, LH 2, FSH 4, DHEA-S normal at 109, sex hormone binding globulin normal.    12/13/2021, done at 5:30 PM:  CMP was normal, renin was 3.7, ACTH was 15, androstendione was normal at 1284, cortisol level was 6.1 at 5:30 PM, DHEA-S was 99, both total and free testosterone were low (155 and 2.71, checked in the afternoon), LH was 3.4 and FSH was 2.8, morning free testosterone was 3.16.    The testicular US from 12/2018 revealed irregular similar-appearing hypoechoic foci in the left and right testes, measuring 1.1 and 2.7 cm, suggestive of testicular adrenal rests. They remained stable on the f/up testicular US from 12/2019 and 12/13/2021.     Today, Laci, took his morning medications at 6:15 AM.  He denies any specific complaints.  He has not experienced episodes of sickness requiring a higher dose of hydrocortisone since his last visit here.    Past Medical History   Past Medical History:   Diagnosis Date     Anxiety      Aortic atresia or stenosis, congenital 12/2001    Repair 2001 after had MI;     Asthma, moderate persistent, well-controlled      Congenital adrenal hyperplasia (H) 1989    Dx at 1 mo of age; mother with CAH also (?)   Depression   No fractures  Asthma  Urinary urgency/retention -evaluated in the urology clinic in 2020  Tinea corporis     Past Surgical History   Past Surgical History:   Procedure Laterality Date     aortic stenosis s/p surgical repair including cor reconstruction 2001         Current Medications    Current Outpatient Medications:      budesonide-formoterol (SYMBICORT) 160-4.5 MCG/ACT Inhaler, INHALE TWO PUFFS TWICE DAILY AND 1 TO 2 WITH SPACER EVERY 4 HOURS FOR PERSISENT SYMPTOMS. MAY USE 2 EVERY 20 MINS FOR 3 DOSES. MAX OF 12, Disp: 10.2 g, Rfl: 1     buPROPion (WELLBUTRIN XL) 150 MG 24 hr tablet, Take 150 mg by mouth every morning, Disp: , Rfl:      fludrocortisone (FLORINEF) 0.1 MG tablet, Take 1 tablet (0.1  "mg) by mouth daily, Disp: 90 tablet, Rfl: 3     hydrocortisone (CORTEF) 10 MG tablet, Take 2 tablets in the morning and 1 tablet in the afternoon.  Plus extra tablets for sickness., Disp: 300 tablet, Rfl: 2     hydrocortisone sodium succinate PF (SOLU-CORTEF) injection, Inject 2 mLs (100 mg) into the muscle once as needed Dispense as Act-O-Vial, Disp: 2 mL, Rfl: 1     ketoconazole (NIZORAL) 2 % external cream, Apply topically daily X 2-3 weeks until clinical resolution, Disp: 60 g, Rfl: 2     ketoconazole (NIZORAL) 2 % external shampoo, Apply topically twice a week Apply 5 to 10 mL to wet scalp; leave on for 3 to 5 minutes and then rinse off thoroughly., Disp: 120 mL, Rfl: 3     lamoTRIgine (LAMICTAL) 100 MG tablet, Take 100 mg by mouth daily, Disp: , Rfl:      sildenafil (VIAGRA) 50 MG tablet, Take 1 tablet (50 mg) by mouth daily as needed (ED), Disp: 20 tablet, Rfl: 1     Syringe/Needle, Disp, (SYRINGE LUER LOCK) 23G X 1\" 3 ML MISC, 100 mg as needed, Disp: 3 each, Rfl: 3     triamcinolone (KENALOG) 0.1 % external ointment, Apply to rash twice daily for 2-4 weeks, then stop. Restart if rash flares in the future. Not for groin, underarms, or face., Disp: 30 g, Rfl: 2     Family History   Mother - congenital adrenal hyperplasia. Father - CVA in his 60s.     Social History  Lives with a partner. No children. Former smoker, 1/2 PPD for 10 years.  Denies using illicit drugs. Occupation: unemployed.     Review of Systems   Systemic:             No fatigue   Eye:                      No eye symptoms   Lizeth-Laryngeal:     No lizeth-laryngeal symptoms, no dysphagia, no hoarseness, no cough     Breast:                  No breast symptoms  Cardiovascular:    No cardiovascular symptoms, no CP or palpitations   Pulmonary:           No pulmonary symptoms, no SOB or cough    Gastrointestinal:   diarrhea since taking a new antidepressive medication    Genitourinary:      Urinates ~1 times a night, 2-3 times a week   Endocrine:         "    No endocrine symptoms, no cold or heat intolerance; ED significantly improved with viagra  Neurological:        No headaches, no tremor, no numbness or tingling sensation, no dizziness   Musculoskeletal: He is currently training with a  and trying to build up muscle mass.    Skin:                     no dry skin, no hair falling out; no changes of the facial hair; no acne, no stretch marks; no easy bruising   Psychological:      Depression - longstanding - seeing a counselor               Vital Signs     Previous Weights:    Wt Readings from Last 10 Encounters:   10/07/24 63.8 kg (140 lb 9.6 oz)   09/26/24 62.7 kg (138 lb 3.2 oz)   09/06/24 61.7 kg (136 lb)   08/22/24 63.1 kg (139 lb 3.2 oz)   05/03/24 64.4 kg (142 lb)   11/20/23 72.1 kg (159 lb)   09/20/23 73.9 kg (163 lb)   04/26/23 78.5 kg (173 lb)   06/20/22 77 kg (169 lb 12.8 oz)   12/20/21 72.6 kg (160 lb)     BP Readings from Last 6 Encounters:   10/07/24 (!) 162/94   09/26/24 118/65   09/06/24 109/76   08/22/24 128/82   05/03/24 117/73   11/20/23 138/78        /82 (BP Location: Left arm, Patient Position: Sitting, Cuff Size: Adult Regular)   Pulse 97   Wt 62.1 kg (137 lb)   SpO2 100%   BMI 21.78 kg/m      General appearance: thin, no distress noted   Eyes: conjunctivae and extraocular motions are normal. Pupils are equal, round, and reactive to light. No lid lag, no stare.  HENT: oropharynx clear and moist; neck no JVD, no bruits, no thyromegaly, no palpable nodules  Cardiovascular: regular rhythm, hyperkinetic, no murmurs, distal pulses palpable, no edema  Respiratory: chest clear, no rales, no rhonchi  Musculoskeletal: normal tone and strength   Neurologic: reflexes normal and symmetric, fine resting tremor of the outstretched hands  Psychiatric: affect and judgment normal   Skin: No stretch marks, no bruises, no hyperpigmentation  Genital exam: normal testicular size, no palpable masses     Lab Results  I reviewed prior lab  results documented in Epic.  TSH   Date Value Ref Range Status   09/22/2023 1.49 0.30 - 4.20 uIU/mL Final   12/22/2021 0.29 (L) 0.40 - 4.00 mU/L Final   08/07/2008 0.34 mcU/mL Final     The longitudinal plan of care for the diagnosis(es)/condition(s) as documented were addressed during this visit. Due to the added complexity in care, I will continue to support Laci in the subsequent management and with ongoing continuity of care.                 Again, thank you for allowing me to participate in the care of your patient.        Sincerely,        Deja Warner MD

## 2024-10-17 LAB
ACTH PLAS-MCNC: 48 PG/ML
DHEA-S SERPL-MCNC: 135 UG/DL (ref 80–560)

## 2024-10-18 ENCOUNTER — ANCILLARY PROCEDURE (OUTPATIENT)
Dept: ULTRASOUND IMAGING | Facility: CLINIC | Age: 34
End: 2024-10-18
Attending: INTERNAL MEDICINE
Payer: COMMERCIAL

## 2024-10-18 DIAGNOSIS — E25.0 CONGENITAL ADRENAL HYPERPLASIA DUE TO 21-HYDROXYLASE DEFICIENCY (21-OH CAH), SIMPLE VIRILIZING (H): ICD-10-CM

## 2024-10-18 LAB
TESTOST FREE SERPL-MCNC: 5.49 NG/DL
TESTOST SERPL-MCNC: 327 NG/DL (ref 240–950)

## 2024-10-18 PROCEDURE — 99207 US TESTICULAR AND SCROTUM WITH DOPPLER LIMITED: CPT | Mod: GC | Performed by: RADIOLOGY

## 2024-10-18 PROCEDURE — 76870 US EXAM SCROTUM: CPT | Mod: GC | Performed by: RADIOLOGY

## 2024-10-20 LAB — RENIN PLAS-CCNC: 0.4 NG/ML/HR

## 2024-10-21 LAB — ANDROST SERPL-MCNC: 9.28 NG/ML

## 2024-10-25 DIAGNOSIS — E27.40 ADRENAL INSUFFICIENCY (H): ICD-10-CM

## 2024-10-25 DIAGNOSIS — E25.0 CONGENITAL ADRENAL HYPERPLASIA DUE TO 21-HYDROXYLASE DEFICIENCY (21-OH CAH), SIMPLE VIRILIZING (H): ICD-10-CM

## 2024-10-25 RX ORDER — HYDROCORTISONE 10 MG/1
TABLET ORAL
Qty: 300 TABLET | Refills: 3 | Status: SHIPPED | OUTPATIENT
Start: 2024-10-25

## 2024-10-25 RX ORDER — FLUDROCORTISONE ACETATE 0.1 MG/1
0.05 TABLET ORAL DAILY
Qty: 45 TABLET | Refills: 3 | Status: SHIPPED | OUTPATIENT
Start: 2024-10-25

## 2024-10-28 ENCOUNTER — TELEPHONE (OUTPATIENT)
Dept: ENDOCRINOLOGY | Facility: CLINIC | Age: 34
End: 2024-10-28
Payer: COMMERCIAL

## 2024-10-28 NOTE — TELEPHONE ENCOUNTER
Prior Authorization Retail Medication Request    Medication/Dose: Solu-Cortef injection    [email or web address]  Key - [id number]    Lanette Barker CMA  Adult Endocrinology  [facility], [place]

## 2024-10-29 NOTE — TELEPHONE ENCOUNTER
PRIOR AUTHORIZATION DENIED    Medication: SOLU-CORTEF 100 MG [facility]  Insurance Company: [employer] ([facility]) - Phone [phone] Fax [phone]  Denial Date: 10/29/2024  Denial Reason(s): needs to bill medically  Appeal Information: NA  Patient Notified: NO

## 2024-11-25 DIAGNOSIS — J45.40 ASTHMA, MODERATE PERSISTENT, WELL-CONTROLLED: ICD-10-CM

## 2024-11-27 RX ORDER — BUDESONIDE AND FORMOTEROL FUMARATE DIHYDRATE 160; 4.5 UG/1; UG/1
AEROSOL RESPIRATORY (INHALATION)
Qty: 10.2 G | Refills: 1 | Status: SHIPPED | OUTPATIENT
Start: 2024-11-27

## 2024-11-27 NOTE — TELEPHONE ENCOUNTER
SYMBICORT 160/4.5MCG (120 ORAL INH)       Last Written Prescription Date:  9/25/24  Last Fill Quantity: 10.2,   # refills: 1  Last Office Visit : 9/6/24 [facility] Office visit:  none  ACT=25 on 5/2/24      [facility] has closed effective date 10/31/24

## 2025-02-09 NOTE — PROGRESS NOTES
Refill request for Symbicort 160-4.5 MCG/ACT [facility] as requested per pt. Most recent visit w/ provider in Dec 2020, asthma well controlled w/ current medication regimen.   11-Feb-2025

## 2025-02-19 ENCOUNTER — MYC REFILL (OUTPATIENT)
Dept: DERMATOLOGY | Facility: CLINIC | Age: 35
End: 2025-02-19

## 2025-02-19 DIAGNOSIS — L20.84 INTRINSIC ECZEMA: ICD-10-CM

## 2025-02-24 RX ORDER — TRIAMCINOLONE ACETONIDE 1 MG/G
OINTMENT TOPICAL
Qty: 30 G | Refills: 2 | Status: SHIPPED | OUTPATIENT
Start: 2025-02-24

## 2025-02-24 NOTE — TELEPHONE ENCOUNTER
Last Written Prescription:   Disp Refills Start End BETH   triamcinolone (KENALOG) 0.1 % external ointment 30 g 2 9/23/2024 -- No   Sig: Apply to rash twice daily for 2-4 weeks, then stop. Restart if rash flares in the future. Not for groin, underarms, or face.     ----------------------  Last Visit Date: 9/17/2024  [facility]      Future Visit Date: 0  ----------------------      [x]  Refill decision: Medication unable to be refilled by RN due to: Other:  Review needed, is patient to continue this medication?  Last sig instructions to stop after 2-4 weeks, had 2 additional refills - are refills needed?  No follow-up appt scheduled, LOV indicated return PRN.    Derm-Allergy Protocol - Topical Steroids  Generic name: triamcinolone (oral)   Brand names: Kenalog, Trianex, Triderm, Aristocort Topical, Kourzeq    Protocol Details: Derm Process #4  -REVIEW last clinic note.   -Refill qty to next appointment if visit scheduled within RTC recommendation in LOV note.  -IF no appointment, Refill qty to RTC recommended date per LOV, AND send message to clinic coordinator.  -Refuse if over RTC recommendation in last clinic note. If no appointment send message to clinic coordinator.  -Refill qty to next RTC recommended course if there was not qty sufficient to complete course of treatment scheduled within RTC recommendation in last clinic note - appointment or no appointment.                                                      Request from pharmacy:  Requested Prescriptions   Pending Prescriptions Disp Refills    triamcinolone (KENALOG) 0.1 % external ointment 30 g 2     Sig: Apply to rash twice daily for 2-4 weeks, then stop. Restart if rash flares in the future. Not for groin, underarms, or face.       Topical Steroids and Nonsteroidals Protocol Passed - 2/24/2025 12:49 PM        Passed - Patient is age 6 or older        Passed - Authorizing prescriber's most recent note related to this  medication read.     If refill request is for ophthalmic use, please forward request to provider for approval.          Passed - High potency steroid not ordered        Passed - Medication is active on med list and the sig matches. RN to manually verify dose and sig if red X/fail.     If the protocol passes (green check), you do not need to verify med dose and sig.    A prescription matches if they are the same clinical intention.    For Example: once daily and every morning are the same.    For all fails (red x), verify dose and sig.    If the refill does match what is on file, the RN can still proceed to approve the refill request.     If they do not match, route to the appropriate provider.             Passed - Recent (12 mo) or future (90 days) visit within the authorizing provider's specialty     The patient must have completed an in-person or virtual visit within the past 12 months or has a future visit scheduled within the next 90 days with the authorizing provider s specialty.  Urgent care and e-visits do not qualify as an office visit for this protocol.

## 2025-03-17 NOTE — RESULT ENCOUNTER NOTE
Miguel Aguero!  I just called and left a message.  Below are my recommendations:  - Decrease the dose of fludrocortisone and only take half a tablet daily  - Move the administration of the second dose of hydrocortisone from 2 PM to 5 PM  - Have blood work done again, in approximately 1 month, 2 to 3 hours after taking the morning meds  - Let me know if the blood pressure numbers are still elevated [email or web address]

## 2025-05-07 ASSESSMENT — PATIENT HEALTH QUESTIONNAIRE - PHQ9
SUM OF ALL RESPONSES TO PHQ QUESTIONS 1-9: 0
SUM OF ALL RESPONSES TO PHQ QUESTIONS 1-9: 0
10. IF YOU CHECKED OFF ANY PROBLEMS, HOW DIFFICULT HAVE THESE PROBLEMS MADE IT FOR YOU TO DO YOUR WORK, TAKE CARE OF THINGS AT HOME, OR GET ALONG WITH OTHER PEOPLE: NOT DIFFICULT AT ALL

## 2025-05-07 ASSESSMENT — ASTHMA QUESTIONNAIRES
QUESTION_2 LAST FOUR WEEKS HOW OFTEN HAVE YOU HAD SHORTNESS OF BREATH: NOT AT ALL
QUESTION_5 LAST FOUR WEEKS HOW WOULD YOU RATE YOUR ASTHMA CONTROL: COMPLETELY CONTROLLED
ACT_TOTALSCORE: 25
QUESTION_1 LAST FOUR WEEKS HOW MUCH OF THE TIME DID YOUR ASTHMA KEEP YOU FROM GETTING AS MUCH DONE AT WORK, SCHOOL OR AT HOME: NONE OF THE TIME
QUESTION_3 LAST FOUR WEEKS HOW OFTEN DID YOUR ASTHMA SYMPTOMS (WHEEZING, COUGHING, SHORTNESS OF BREATH, CHEST TIGHTNESS OR PAIN) WAKE YOU UP AT NIGHT OR EARLIER THAN USUAL IN THE MORNING: NOT AT ALL
QUESTION_4 LAST FOUR WEEKS HOW OFTEN HAVE YOU USED YOUR RESCUE INHALER OR NEBULIZER MEDICATION (SUCH AS ALBUTEROL): NOT AT ALL

## 2025-05-08 ENCOUNTER — OFFICE VISIT (OUTPATIENT)
Dept: INTERNAL MEDICINE | Facility: CLINIC | Age: 35
End: 2025-05-08
Payer: COMMERCIAL

## 2025-05-08 VITALS
TEMPERATURE: 97.8 F | DIASTOLIC BLOOD PRESSURE: 74 MMHG | HEIGHT: 67 IN | HEART RATE: 102 BPM | SYSTOLIC BLOOD PRESSURE: 106 MMHG | OXYGEN SATURATION: 99 % | BODY MASS INDEX: 19.53 KG/M2 | RESPIRATION RATE: 14 BRPM | WEIGHT: 124.4 LBS

## 2025-05-08 DIAGNOSIS — J45.40 ASTHMA, MODERATE PERSISTENT, WELL-CONTROLLED: ICD-10-CM

## 2025-05-08 RX ORDER — BUDESONIDE AND FORMOTEROL FUMARATE DIHYDRATE 160; 4.5 UG/1; UG/1
AEROSOL RESPIRATORY (INHALATION)
Qty: 10.2 G | Refills: 1 | Status: SHIPPED | OUTPATIENT
Start: 2025-05-08

## 2025-05-08 NOTE — PROGRESS NOTES
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  Followed by an endocrinology team  - Patient was reminded to complete the follow-up labs, per recommendation of the endocrinology team    Asthma  Only utilizes Symbicort only for exercise  - Refill was provided    Supravalvular aortic stenosis s/p surgical resection of fibrous tissue obstructing the coronary arteries & coronary artery reconstruction  Reports no chest pain, palpitations, shortness of breath  Followed by cardiology team every 2 years    Preventive measures  Patient had recent STD checks, which were all negative  He states he gets them checked every 3 months  Patient had recent cholesterol check as well, LDL less than 100, HDL high  Patient is up-to-date with all the vaccinations    Treatment-resistant depression  On bupropion and lamotrigine  Followed by psychiatrist and therapist        Subjective   Laci is a 34 year old, presenting for the following health issues:  Establish Care and Medication Refill      5/8/2025    12:40 PM   Additional Questions   Roomed by kamille     Medication Refill    History of Present Illness       He eats 0-1 servings of fruits and vegetables daily.He consumes 0 sweetened beverage(s) daily.He exercises with enough effort to increase his heart rate 30 to 60 minutes per day.  He exercises with enough effort to increase his heart rate 5 days per week.   He is taking medications regularly.            5/7/2025   Asthma   1.  In the past 4 weeks, how much of the time did your asthma keep you from getting as much done at work, school or at home? 5   2.  During the past 4 weeks, how often have you had shortness of breath? 5   3.  During the past 4 weeks, how often did your asthma symptoms (wheezing, coughing, shortness of breath, chest tightness or pain) wake you up at night or earlier than usual in the morning? 5   4.  During the past 4 weeks, how often have  "you used your rescue inhaler or nebulizer medication (such as albuterol)? 5   5.  How would you rate your asthma control during the past 4 weeks? 5   ACT TOTAL SCORE (Goal Greater than or Equal to 20) 25    In the past 12 months, how many times did you visit the emergency room for your asthma without being admitted to the hospital? 0   In the past 12 months, how many times were you hospitalized overnight because of your asthma? 0   Do you have a cough, wheezing or shortness of breath? None of these symptoms (Cough, Wheezing, Shortness of breath)   What makes your asthma/breathing worse? Dust mites    Mold    Exercise or sports    Cold air   Do you want more information about how to use your inhaler? No       Patient-reported    Multiple values from one day are sorted in reverse-chronological order         Review of Systems  Constitutional, HEENT, cardiovascular, pulmonary, gi and gu systems are negative, except as otherwise noted.      Objective    /74 (BP Location: Left arm, Patient Position: Sitting, Cuff Size: Adult Regular)   Pulse 102   Temp 97.8  F (36.6  C) (Oral)   Resp 14   Ht 1.7 m (5' 6.93\")   Wt 56.4 kg (124 lb 6.4 oz)   SpO2 99%   BMI 19.53 kg/m    Body mass index is 19.53 kg/m .    Physical Exam   General appearance: Looks comfortable, in no apparent distress  HEENT: Neck supple, sclera anicteric  CV: S1 and S2 well heard without any added sounds  Resp: Normal respiratory effort, clear to ausculation bilaterally without any added sounds  Abd: Soft, non-tender, non-distended  Extr: No peripheral edema  Skin: Warm and dry  Neuro: Alert and oriented x4, appeared non-focal    I saw the patient and discussed the plan with the attending physician, Dr. Tamayo.      Brice Manzo MD  Resident Physician PGY-2  Department of Medicine  [facility]    Signed Electronically by: Brice Manzo MD    "

## 2025-06-14 ENCOUNTER — HEALTH MAINTENANCE LETTER (OUTPATIENT)
Age: 35
End: 2025-06-14